# Patient Record
Sex: MALE | Race: WHITE | Employment: OTHER | URBAN - METROPOLITAN AREA
[De-identification: names, ages, dates, MRNs, and addresses within clinical notes are randomized per-mention and may not be internally consistent; named-entity substitution may affect disease eponyms.]

---

## 2017-01-09 ENCOUNTER — ANTI-COAG VISIT (OUTPATIENT)
Dept: CARDIOLOGY CLINIC | Age: 72
End: 2017-01-09

## 2017-01-09 DIAGNOSIS — I49.5 SICK SINUS SYNDROME (HCC): ICD-10-CM

## 2017-01-09 DIAGNOSIS — I48.91 ATRIAL FIBRILLATION, UNSPECIFIED TYPE (HCC): ICD-10-CM

## 2017-01-09 DIAGNOSIS — I48.20 CHRONIC ATRIAL FIBRILLATION (HCC): ICD-10-CM

## 2017-01-09 DIAGNOSIS — R00.2 PALPITATIONS: ICD-10-CM

## 2017-01-09 DIAGNOSIS — I48.0 PAROXYSMAL ATRIAL FIBRILLATION (HCC): ICD-10-CM

## 2017-01-09 DIAGNOSIS — Z98.61 HISTORY OF CORONARY ANGIOPLASTY: ICD-10-CM

## 2017-01-09 DIAGNOSIS — I25.2 OLD MYOCARDIAL INFARCTION: ICD-10-CM

## 2017-01-09 DIAGNOSIS — R06.02 SHORTNESS OF BREATH: ICD-10-CM

## 2017-01-09 LAB
INR BLD: 2.8 (ref 0.85–1.16)
PROTHROMBIN TIME: 32.6 SEC (ref 9.8–13)

## 2017-01-23 ENCOUNTER — ANTI-COAG VISIT (OUTPATIENT)
Dept: CARDIOLOGY CLINIC | Age: 72
End: 2017-01-23

## 2017-01-23 DIAGNOSIS — R06.02 SHORTNESS OF BREATH: ICD-10-CM

## 2017-01-23 DIAGNOSIS — I48.0 PAROXYSMAL ATRIAL FIBRILLATION (HCC): ICD-10-CM

## 2017-01-23 DIAGNOSIS — I25.2 OLD MYOCARDIAL INFARCTION: ICD-10-CM

## 2017-01-23 DIAGNOSIS — I48.91 ATRIAL FIBRILLATION, UNSPECIFIED TYPE (HCC): ICD-10-CM

## 2017-01-23 DIAGNOSIS — Z98.61 HISTORY OF CORONARY ANGIOPLASTY: ICD-10-CM

## 2017-01-23 DIAGNOSIS — I49.5 SICK SINUS SYNDROME (HCC): ICD-10-CM

## 2017-01-23 DIAGNOSIS — R00.2 PALPITATIONS: ICD-10-CM

## 2017-01-23 DIAGNOSIS — I48.20 CHRONIC ATRIAL FIBRILLATION (HCC): ICD-10-CM

## 2017-01-23 LAB
INR BLD: 2.58
INR BLD: 2.58 (ref 0.85–1.16)
PROTHROMBIN TIME: 30 SEC (ref 9.8–13)

## 2017-01-26 ENCOUNTER — OFFICE VISIT (OUTPATIENT)
Dept: CARDIOLOGY CLINIC | Age: 72
End: 2017-01-26

## 2017-01-26 VITALS
HEART RATE: 56 BPM | WEIGHT: 202.6 LBS | DIASTOLIC BLOOD PRESSURE: 68 MMHG | SYSTOLIC BLOOD PRESSURE: 136 MMHG | HEIGHT: 68 IN | RESPIRATION RATE: 16 BRPM | BODY MASS INDEX: 30.71 KG/M2

## 2017-01-26 DIAGNOSIS — R00.2 PALPITATIONS: ICD-10-CM

## 2017-01-26 DIAGNOSIS — I45.5 SINUS PAUSE: ICD-10-CM

## 2017-01-26 DIAGNOSIS — I48.0 PAROXYSMAL ATRIAL FIBRILLATION (HCC): Primary | ICD-10-CM

## 2017-01-26 PROCEDURE — G8484 FLU IMMUNIZE NO ADMIN: HCPCS | Performed by: INTERNAL MEDICINE

## 2017-01-26 PROCEDURE — G8427 DOCREV CUR MEDS BY ELIG CLIN: HCPCS | Performed by: INTERNAL MEDICINE

## 2017-01-26 PROCEDURE — 1036F TOBACCO NON-USER: CPT | Performed by: INTERNAL MEDICINE

## 2017-01-26 PROCEDURE — 4040F PNEUMOC VAC/ADMIN/RCVD: CPT | Performed by: INTERNAL MEDICINE

## 2017-01-26 PROCEDURE — G8598 ASA/ANTIPLAT THER USED: HCPCS | Performed by: INTERNAL MEDICINE

## 2017-01-26 PROCEDURE — G8419 CALC BMI OUT NRM PARAM NOF/U: HCPCS | Performed by: INTERNAL MEDICINE

## 2017-01-26 PROCEDURE — 99214 OFFICE O/P EST MOD 30 MIN: CPT | Performed by: INTERNAL MEDICINE

## 2017-01-26 PROCEDURE — 3017F COLORECTAL CA SCREEN DOC REV: CPT | Performed by: INTERNAL MEDICINE

## 2017-01-26 PROCEDURE — 1123F ACP DISCUSS/DSCN MKR DOCD: CPT | Performed by: INTERNAL MEDICINE

## 2017-01-26 PROCEDURE — 93000 ELECTROCARDIOGRAM COMPLETE: CPT | Performed by: INTERNAL MEDICINE

## 2017-01-26 RX ORDER — SOTALOL HYDROCHLORIDE 80 MG/1
40 TABLET ORAL 2 TIMES DAILY
Qty: 90 TABLET | Refills: 3 | Status: SHIPPED | OUTPATIENT
Start: 2017-01-26 | End: 2018-01-10 | Stop reason: SDUPTHER

## 2017-01-26 RX ORDER — FUROSEMIDE 20 MG/1
20 TABLET ORAL DAILY
COMMUNITY
End: 2019-03-14

## 2017-01-27 ENCOUNTER — NURSE ONLY (OUTPATIENT)
Dept: CARDIOLOGY CLINIC | Age: 72
End: 2017-01-27

## 2017-01-27 DIAGNOSIS — I48.91 ATRIAL FIBRILLATION, UNSPECIFIED TYPE (HCC): Primary | ICD-10-CM

## 2017-02-07 ENCOUNTER — OFFICE VISIT (OUTPATIENT)
Dept: CARDIOLOGY CLINIC | Age: 72
End: 2017-02-07

## 2017-02-07 VITALS
SYSTOLIC BLOOD PRESSURE: 112 MMHG | BODY MASS INDEX: 30.86 KG/M2 | WEIGHT: 203.6 LBS | DIASTOLIC BLOOD PRESSURE: 48 MMHG | HEIGHT: 68 IN | HEART RATE: 60 BPM

## 2017-02-07 DIAGNOSIS — I25.2 MI, OLD: ICD-10-CM

## 2017-02-07 DIAGNOSIS — I50.32 CHRONIC DIASTOLIC CONGESTIVE HEART FAILURE (HCC): Primary | ICD-10-CM

## 2017-02-07 DIAGNOSIS — I48.0 PAROXYSMAL ATRIAL FIBRILLATION (HCC): ICD-10-CM

## 2017-02-07 DIAGNOSIS — Z98.61 POST PTCA: ICD-10-CM

## 2017-02-07 DIAGNOSIS — I25.10 CAD IN NATIVE ARTERY: ICD-10-CM

## 2017-02-07 DIAGNOSIS — I10 ESSENTIAL HYPERTENSION: ICD-10-CM

## 2017-02-07 DIAGNOSIS — I35.0 NONRHEUMATIC AORTIC VALVE STENOSIS: ICD-10-CM

## 2017-02-07 PROCEDURE — 4040F PNEUMOC VAC/ADMIN/RCVD: CPT | Performed by: INTERNAL MEDICINE

## 2017-02-07 PROCEDURE — 1036F TOBACCO NON-USER: CPT | Performed by: INTERNAL MEDICINE

## 2017-02-07 PROCEDURE — G8419 CALC BMI OUT NRM PARAM NOF/U: HCPCS | Performed by: INTERNAL MEDICINE

## 2017-02-07 PROCEDURE — G8598 ASA/ANTIPLAT THER USED: HCPCS | Performed by: INTERNAL MEDICINE

## 2017-02-07 PROCEDURE — G8484 FLU IMMUNIZE NO ADMIN: HCPCS | Performed by: INTERNAL MEDICINE

## 2017-02-07 PROCEDURE — 1123F ACP DISCUSS/DSCN MKR DOCD: CPT | Performed by: INTERNAL MEDICINE

## 2017-02-07 PROCEDURE — 99214 OFFICE O/P EST MOD 30 MIN: CPT | Performed by: INTERNAL MEDICINE

## 2017-02-07 PROCEDURE — G8427 DOCREV CUR MEDS BY ELIG CLIN: HCPCS | Performed by: INTERNAL MEDICINE

## 2017-02-07 PROCEDURE — 3017F COLORECTAL CA SCREEN DOC REV: CPT | Performed by: INTERNAL MEDICINE

## 2017-02-14 ENCOUNTER — TELEPHONE (OUTPATIENT)
Dept: CARDIOLOGY CLINIC | Age: 72
End: 2017-02-14

## 2017-02-14 ENCOUNTER — NURSE ONLY (OUTPATIENT)
Dept: CARDIOLOGY CLINIC | Age: 72
End: 2017-02-14

## 2017-02-14 DIAGNOSIS — Z79.01 LONG TERM (CURRENT) USE OF ANTICOAGULANTS: ICD-10-CM

## 2017-02-14 DIAGNOSIS — Z01.818 PRE-OP TESTING: Primary | ICD-10-CM

## 2017-02-14 DIAGNOSIS — I45.5 SINUS PAUSE: ICD-10-CM

## 2017-02-15 PROBLEM — Z95.0 CARDIAC PACEMAKER IN SITU: Status: ACTIVE | Noted: 2017-02-15

## 2017-02-21 ENCOUNTER — PROCEDURE VISIT (OUTPATIENT)
Dept: CARDIOLOGY CLINIC | Age: 72
End: 2017-02-21

## 2017-02-21 ENCOUNTER — OFFICE VISIT (OUTPATIENT)
Dept: CARDIOLOGY CLINIC | Age: 72
End: 2017-02-21

## 2017-02-21 VITALS
BODY MASS INDEX: 31.04 KG/M2 | HEIGHT: 68 IN | SYSTOLIC BLOOD PRESSURE: 134 MMHG | HEART RATE: 60 BPM | WEIGHT: 204.8 LBS | DIASTOLIC BLOOD PRESSURE: 64 MMHG

## 2017-02-21 DIAGNOSIS — Z79.01 LONG TERM (CURRENT) USE OF ANTICOAGULANTS: ICD-10-CM

## 2017-02-21 DIAGNOSIS — I45.5 SINUS PAUSE: Primary | ICD-10-CM

## 2017-02-21 DIAGNOSIS — Z95.0 CARDIAC PACEMAKER IN SITU: ICD-10-CM

## 2017-02-21 DIAGNOSIS — I10 ESSENTIAL HYPERTENSION: ICD-10-CM

## 2017-02-21 DIAGNOSIS — I48.0 PAROXYSMAL ATRIAL FIBRILLATION (HCC): ICD-10-CM

## 2017-02-21 DIAGNOSIS — I49.5 SICK SINUS SYNDROME (HCC): Primary | ICD-10-CM

## 2017-02-21 DIAGNOSIS — R00.1 BRADYCARDIA: ICD-10-CM

## 2017-02-21 DIAGNOSIS — Z01.818 PRE-OP TESTING: ICD-10-CM

## 2017-02-21 DIAGNOSIS — Z95.0 PACEMAKER: ICD-10-CM

## 2017-02-21 DIAGNOSIS — I45.5 SINUS PAUSE: ICD-10-CM

## 2017-02-21 DIAGNOSIS — R00.2 PALPITATIONS: ICD-10-CM

## 2017-02-21 LAB
ANION GAP SERPL CALCULATED.3IONS-SCNC: 14 MMOL/L (ref 3–16)
BASOPHILS ABSOLUTE: 0.1 K/UL (ref 0–0.2)
BASOPHILS RELATIVE PERCENT: 1.5 %
BUN BLDV-MCNC: 33 MG/DL (ref 7–20)
CALCIUM SERPL-MCNC: 9.2 MG/DL (ref 8.3–10.6)
CHLORIDE BLD-SCNC: 106 MMOL/L (ref 99–110)
CO2: 23 MMOL/L (ref 21–32)
CREAT SERPL-MCNC: 1 MG/DL (ref 0.8–1.3)
EOSINOPHILS ABSOLUTE: 0.4 K/UL (ref 0–0.6)
EOSINOPHILS RELATIVE PERCENT: 7.9 %
GFR AFRICAN AMERICAN: >60
GFR NON-AFRICAN AMERICAN: >60
GLUCOSE BLD-MCNC: 132 MG/DL (ref 70–99)
HCT VFR BLD CALC: 33.2 % (ref 40.5–52.5)
HEMOGLOBIN: 11 G/DL (ref 13.5–17.5)
INR BLD: 1.56
INR BLD: 1.56 (ref 0.85–1.15)
LYMPHOCYTES ABSOLUTE: 1 K/UL (ref 1–5.1)
LYMPHOCYTES RELATIVE PERCENT: 19 %
MCH RBC QN AUTO: 30.2 PG (ref 26–34)
MCHC RBC AUTO-ENTMCNC: 33.2 G/DL (ref 31–36)
MCV RBC AUTO: 90.7 FL (ref 80–100)
MONOCYTES ABSOLUTE: 0.7 K/UL (ref 0–1.3)
MONOCYTES RELATIVE PERCENT: 12.4 %
NEUTROPHILS ABSOLUTE: 3.3 K/UL (ref 1.7–7.7)
NEUTROPHILS RELATIVE PERCENT: 59.2 %
PDW BLD-RTO: 15.5 % (ref 12.4–15.4)
PLATELET # BLD: 255 K/UL (ref 135–450)
PMV BLD AUTO: 9.8 FL (ref 5–10.5)
POTASSIUM SERPL-SCNC: 5.2 MMOL/L (ref 3.5–5.1)
PROTHROMBIN TIME: 17.6 SEC (ref 9.6–13)
RBC # BLD: 3.66 M/UL (ref 4.2–5.9)
SODIUM BLD-SCNC: 143 MMOL/L (ref 136–145)
WBC # BLD: 5.5 K/UL (ref 4–11)

## 2017-02-21 PROCEDURE — G8427 DOCREV CUR MEDS BY ELIG CLIN: HCPCS | Performed by: NURSE PRACTITIONER

## 2017-02-21 PROCEDURE — G8417 CALC BMI ABV UP PARAM F/U: HCPCS | Performed by: NURSE PRACTITIONER

## 2017-02-21 PROCEDURE — 99214 OFFICE O/P EST MOD 30 MIN: CPT | Performed by: NURSE PRACTITIONER

## 2017-02-21 PROCEDURE — 93280 PM DEVICE PROGR EVAL DUAL: CPT | Performed by: INTERNAL MEDICINE

## 2017-02-21 PROCEDURE — 1123F ACP DISCUSS/DSCN MKR DOCD: CPT | Performed by: NURSE PRACTITIONER

## 2017-02-21 PROCEDURE — 1036F TOBACCO NON-USER: CPT | Performed by: NURSE PRACTITIONER

## 2017-02-21 PROCEDURE — G8484 FLU IMMUNIZE NO ADMIN: HCPCS | Performed by: NURSE PRACTITIONER

## 2017-02-21 PROCEDURE — G8598 ASA/ANTIPLAT THER USED: HCPCS | Performed by: NURSE PRACTITIONER

## 2017-02-21 PROCEDURE — 4040F PNEUMOC VAC/ADMIN/RCVD: CPT | Performed by: NURSE PRACTITIONER

## 2017-02-21 PROCEDURE — 3017F COLORECTAL CA SCREEN DOC REV: CPT | Performed by: NURSE PRACTITIONER

## 2017-02-22 ENCOUNTER — ANTI-COAG VISIT (OUTPATIENT)
Dept: CARDIOLOGY | Age: 72
End: 2017-02-22

## 2017-02-22 DIAGNOSIS — I48.91 ATRIAL FIBRILLATION, UNSPECIFIED TYPE (HCC): ICD-10-CM

## 2017-02-22 DIAGNOSIS — I48.0 PAROXYSMAL ATRIAL FIBRILLATION (HCC): ICD-10-CM

## 2017-02-22 DIAGNOSIS — I10 ESSENTIAL HYPERTENSION: Primary | ICD-10-CM

## 2017-02-28 ENCOUNTER — ANTI-COAG VISIT (OUTPATIENT)
Dept: CARDIOLOGY CLINIC | Age: 72
End: 2017-02-28

## 2017-02-28 DIAGNOSIS — I10 ESSENTIAL HYPERTENSION: ICD-10-CM

## 2017-02-28 DIAGNOSIS — I48.0 PAROXYSMAL ATRIAL FIBRILLATION (HCC): ICD-10-CM

## 2017-02-28 LAB
ANION GAP SERPL CALCULATED.3IONS-SCNC: 17 MMOL/L (ref 3–16)
BUN BLDV-MCNC: 36 MG/DL (ref 7–20)
CALCIUM SERPL-MCNC: 8.9 MG/DL (ref 8.3–10.6)
CHLORIDE BLD-SCNC: 102 MMOL/L (ref 99–110)
CO2: 21 MMOL/L (ref 21–32)
CREAT SERPL-MCNC: 1 MG/DL (ref 0.8–1.3)
GFR AFRICAN AMERICAN: >60
GFR NON-AFRICAN AMERICAN: >60
GLUCOSE BLD-MCNC: 141 MG/DL (ref 70–99)
INR BLD: 1.19
INR BLD: 1.19 (ref 0.85–1.15)
POTASSIUM SERPL-SCNC: 4.9 MMOL/L (ref 3.5–5.1)
PROTHROMBIN TIME: 13.5 SEC (ref 9.6–13)
SODIUM BLD-SCNC: 140 MMOL/L (ref 136–145)

## 2017-02-28 PROCEDURE — 93228 REMOTE 30 DAY ECG REV/REPORT: CPT | Performed by: INTERNAL MEDICINE

## 2017-03-01 ENCOUNTER — TELEPHONE (OUTPATIENT)
Dept: CARDIOLOGY CLINIC | Age: 72
End: 2017-03-01

## 2017-03-01 ENCOUNTER — ANTI-COAG VISIT (OUTPATIENT)
Dept: CARDIOLOGY CLINIC | Age: 72
End: 2017-03-01

## 2017-03-01 DIAGNOSIS — I48.91 ATRIAL FIBRILLATION, UNSPECIFIED TYPE (HCC): ICD-10-CM

## 2017-03-06 DIAGNOSIS — I25.2 OLD MYOCARDIAL INFARCTION: ICD-10-CM

## 2017-03-06 DIAGNOSIS — I48.0 PAROXYSMAL ATRIAL FIBRILLATION (HCC): ICD-10-CM

## 2017-03-06 DIAGNOSIS — I49.5 SICK SINUS SYNDROME (HCC): ICD-10-CM

## 2017-03-06 DIAGNOSIS — Z98.61 HISTORY OF CORONARY ANGIOPLASTY: ICD-10-CM

## 2017-03-06 DIAGNOSIS — R06.02 SHORTNESS OF BREATH: ICD-10-CM

## 2017-03-06 DIAGNOSIS — R00.2 PALPITATIONS: ICD-10-CM

## 2017-03-06 DIAGNOSIS — I48.91 ATRIAL FIBRILLATION, UNSPECIFIED TYPE (HCC): ICD-10-CM

## 2017-03-06 LAB
INR BLD: 1.63
INR BLD: 1.63 (ref 0.85–1.15)
PROTHROMBIN TIME: 18.4 SEC (ref 9.6–13)

## 2017-03-07 ENCOUNTER — ANTI-COAG VISIT (OUTPATIENT)
Dept: CARDIOLOGY | Age: 72
End: 2017-03-07

## 2017-03-07 ENCOUNTER — TELEPHONE (OUTPATIENT)
Dept: CARDIOLOGY | Age: 72
End: 2017-03-07

## 2017-03-07 DIAGNOSIS — I49.5 SICK SINUS SYNDROME (HCC): ICD-10-CM

## 2017-03-07 DIAGNOSIS — I48.0 PAROXYSMAL ATRIAL FIBRILLATION (HCC): ICD-10-CM

## 2017-03-07 DIAGNOSIS — I10 ESSENTIAL HYPERTENSION: Primary | ICD-10-CM

## 2017-03-07 DIAGNOSIS — I48.91 ATRIAL FIBRILLATION, UNSPECIFIED TYPE (HCC): ICD-10-CM

## 2017-03-07 DIAGNOSIS — R00.2 PALPITATIONS: Primary | ICD-10-CM

## 2017-03-07 DIAGNOSIS — R00.1 BRADYCARDIA: ICD-10-CM

## 2017-03-14 ENCOUNTER — ANTI-COAG VISIT (OUTPATIENT)
Dept: CARDIOLOGY CLINIC | Age: 72
End: 2017-03-14

## 2017-03-14 DIAGNOSIS — I25.2 OLD MYOCARDIAL INFARCTION: ICD-10-CM

## 2017-03-14 DIAGNOSIS — I48.0 PAROXYSMAL ATRIAL FIBRILLATION (HCC): ICD-10-CM

## 2017-03-14 DIAGNOSIS — R00.2 PALPITATIONS: ICD-10-CM

## 2017-03-14 DIAGNOSIS — I49.5 SICK SINUS SYNDROME (HCC): ICD-10-CM

## 2017-03-14 DIAGNOSIS — I48.91 ATRIAL FIBRILLATION, UNSPECIFIED TYPE (HCC): ICD-10-CM

## 2017-03-14 DIAGNOSIS — Z98.61 HISTORY OF CORONARY ANGIOPLASTY: ICD-10-CM

## 2017-03-14 DIAGNOSIS — R06.02 SHORTNESS OF BREATH: ICD-10-CM

## 2017-03-14 LAB
INR BLD: 2.06 (ref 0.85–1.15)
PROTHROMBIN TIME: 23.3 SEC (ref 9.6–13)

## 2017-03-20 ENCOUNTER — PROCEDURE VISIT (OUTPATIENT)
Dept: CARDIOLOGY CLINIC | Age: 72
End: 2017-03-20

## 2017-03-20 ENCOUNTER — OFFICE VISIT (OUTPATIENT)
Dept: CARDIOLOGY CLINIC | Age: 72
End: 2017-03-20

## 2017-03-20 VITALS
SYSTOLIC BLOOD PRESSURE: 136 MMHG | HEIGHT: 68 IN | WEIGHT: 209.2 LBS | HEART RATE: 60 BPM | DIASTOLIC BLOOD PRESSURE: 66 MMHG | RESPIRATION RATE: 18 BRPM | BODY MASS INDEX: 31.71 KG/M2

## 2017-03-20 DIAGNOSIS — I49.5 SSS (SICK SINUS SYNDROME) (HCC): ICD-10-CM

## 2017-03-20 DIAGNOSIS — R00.2 PALPITATIONS: Primary | ICD-10-CM

## 2017-03-20 DIAGNOSIS — Z95.0 CARDIAC PACEMAKER IN SITU: ICD-10-CM

## 2017-03-20 DIAGNOSIS — I48.0 PAROXYSMAL ATRIAL FIBRILLATION (HCC): ICD-10-CM

## 2017-03-20 DIAGNOSIS — I49.5 SICK SINUS SYNDROME (HCC): ICD-10-CM

## 2017-03-20 DIAGNOSIS — I10 ESSENTIAL HYPERTENSION: ICD-10-CM

## 2017-03-20 DIAGNOSIS — Z95.0 PACEMAKER: ICD-10-CM

## 2017-03-20 DIAGNOSIS — R00.1 BRADYCARDIA: ICD-10-CM

## 2017-03-20 DIAGNOSIS — E87.5 HYPERKALEMIA: ICD-10-CM

## 2017-03-20 PROCEDURE — 99024 POSTOP FOLLOW-UP VISIT: CPT | Performed by: NURSE PRACTITIONER

## 2017-03-20 PROCEDURE — G8484 FLU IMMUNIZE NO ADMIN: HCPCS | Performed by: NURSE PRACTITIONER

## 2017-03-20 PROCEDURE — G8417 CALC BMI ABV UP PARAM F/U: HCPCS | Performed by: NURSE PRACTITIONER

## 2017-03-20 PROCEDURE — 3017F COLORECTAL CA SCREEN DOC REV: CPT | Performed by: NURSE PRACTITIONER

## 2017-03-20 PROCEDURE — 1123F ACP DISCUSS/DSCN MKR DOCD: CPT | Performed by: NURSE PRACTITIONER

## 2017-03-20 PROCEDURE — 93280 PM DEVICE PROGR EVAL DUAL: CPT | Performed by: INTERNAL MEDICINE

## 2017-03-20 PROCEDURE — G8427 DOCREV CUR MEDS BY ELIG CLIN: HCPCS | Performed by: NURSE PRACTITIONER

## 2017-03-20 PROCEDURE — 4040F PNEUMOC VAC/ADMIN/RCVD: CPT | Performed by: NURSE PRACTITIONER

## 2017-03-20 PROCEDURE — G8598 ASA/ANTIPLAT THER USED: HCPCS | Performed by: NURSE PRACTITIONER

## 2017-03-20 PROCEDURE — 93000 ELECTROCARDIOGRAM COMPLETE: CPT | Performed by: NURSE PRACTITIONER

## 2017-03-20 PROCEDURE — 1036F TOBACCO NON-USER: CPT | Performed by: NURSE PRACTITIONER

## 2017-03-21 DIAGNOSIS — I49.5 SICK SINUS SYNDROME (HCC): ICD-10-CM

## 2017-03-21 DIAGNOSIS — I25.2 OLD MYOCARDIAL INFARCTION: ICD-10-CM

## 2017-03-21 DIAGNOSIS — Z98.61 HISTORY OF CORONARY ANGIOPLASTY: ICD-10-CM

## 2017-03-21 DIAGNOSIS — R06.02 SHORTNESS OF BREATH: ICD-10-CM

## 2017-03-21 DIAGNOSIS — I48.91 ATRIAL FIBRILLATION, UNSPECIFIED TYPE (HCC): ICD-10-CM

## 2017-03-21 DIAGNOSIS — I48.0 PAROXYSMAL ATRIAL FIBRILLATION (HCC): ICD-10-CM

## 2017-03-21 DIAGNOSIS — R00.2 PALPITATIONS: ICD-10-CM

## 2017-03-21 LAB
INR BLD: 2.85
INR BLD: 2.85 (ref 0.85–1.15)
PROTHROMBIN TIME: 32.2 SEC (ref 9.6–13)

## 2017-03-22 ENCOUNTER — ANTI-COAG VISIT (OUTPATIENT)
Dept: CARDIOLOGY | Age: 72
End: 2017-03-22

## 2017-03-22 DIAGNOSIS — I48.0 PAROXYSMAL ATRIAL FIBRILLATION (HCC): ICD-10-CM

## 2017-04-06 DIAGNOSIS — R06.02 SHORTNESS OF BREATH: ICD-10-CM

## 2017-04-06 DIAGNOSIS — I48.0 PAROXYSMAL ATRIAL FIBRILLATION (HCC): ICD-10-CM

## 2017-04-06 DIAGNOSIS — I49.5 SICK SINUS SYNDROME (HCC): ICD-10-CM

## 2017-04-06 DIAGNOSIS — I10 ESSENTIAL HYPERTENSION: ICD-10-CM

## 2017-04-06 DIAGNOSIS — Z98.61 HISTORY OF CORONARY ANGIOPLASTY: ICD-10-CM

## 2017-04-06 DIAGNOSIS — I25.2 OLD MYOCARDIAL INFARCTION: ICD-10-CM

## 2017-04-06 DIAGNOSIS — R00.2 PALPITATIONS: ICD-10-CM

## 2017-04-06 DIAGNOSIS — I48.91 ATRIAL FIBRILLATION, UNSPECIFIED TYPE (HCC): ICD-10-CM

## 2017-04-06 LAB
ANION GAP SERPL CALCULATED.3IONS-SCNC: 15 MMOL/L (ref 3–16)
BUN BLDV-MCNC: 31 MG/DL (ref 7–20)
CALCIUM SERPL-MCNC: 9.3 MG/DL (ref 8.3–10.6)
CHLORIDE BLD-SCNC: 103 MMOL/L (ref 99–110)
CO2: 22 MMOL/L (ref 21–32)
CREAT SERPL-MCNC: 1 MG/DL (ref 0.8–1.3)
GFR AFRICAN AMERICAN: >60
GFR NON-AFRICAN AMERICAN: >60
GLUCOSE BLD-MCNC: 132 MG/DL (ref 70–99)
INR BLD: 3.31 (ref 0.85–1.15)
POTASSIUM SERPL-SCNC: 4.7 MMOL/L (ref 3.5–5.1)
PROTHROMBIN TIME: 37.4 SEC (ref 9.6–13)
SODIUM BLD-SCNC: 140 MMOL/L (ref 136–145)

## 2017-04-07 ENCOUNTER — ANTI-COAG VISIT (OUTPATIENT)
Dept: CARDIOLOGY CLINIC | Age: 72
End: 2017-04-07

## 2017-04-07 DIAGNOSIS — I48.0 PAROXYSMAL ATRIAL FIBRILLATION (HCC): ICD-10-CM

## 2017-04-13 DIAGNOSIS — I25.2 OLD MYOCARDIAL INFARCTION: ICD-10-CM

## 2017-04-13 DIAGNOSIS — R06.02 SHORTNESS OF BREATH: ICD-10-CM

## 2017-04-13 DIAGNOSIS — I48.0 PAROXYSMAL ATRIAL FIBRILLATION (HCC): ICD-10-CM

## 2017-04-13 DIAGNOSIS — Z98.61 HISTORY OF CORONARY ANGIOPLASTY: ICD-10-CM

## 2017-04-13 DIAGNOSIS — I48.91 ATRIAL FIBRILLATION, UNSPECIFIED TYPE (HCC): ICD-10-CM

## 2017-04-13 DIAGNOSIS — R00.2 PALPITATIONS: ICD-10-CM

## 2017-04-13 DIAGNOSIS — I49.5 SICK SINUS SYNDROME (HCC): ICD-10-CM

## 2017-04-13 LAB
INR BLD: 2.97 (ref 0.85–1.15)
PROTHROMBIN TIME: 33.6 SEC (ref 9.6–13)

## 2017-04-17 ENCOUNTER — TELEPHONE (OUTPATIENT)
Dept: CARDIOLOGY CLINIC | Age: 72
End: 2017-04-17

## 2017-04-17 ENCOUNTER — ANTI-COAG VISIT (OUTPATIENT)
Dept: CARDIOLOGY CLINIC | Age: 72
End: 2017-04-17

## 2017-04-17 DIAGNOSIS — I48.0 PAROXYSMAL ATRIAL FIBRILLATION (HCC): ICD-10-CM

## 2017-04-26 DIAGNOSIS — I25.119 CORONARY ARTERY DISEASE INVOLVING NATIVE CORONARY ARTERY OF NATIVE HEART WITH ANGINA PECTORIS (HCC): ICD-10-CM

## 2017-04-26 DIAGNOSIS — Z95.0 CARDIAC PACEMAKER IN SITU: ICD-10-CM

## 2017-04-26 DIAGNOSIS — I48.0 PAROXYSMAL ATRIAL FIBRILLATION (HCC): ICD-10-CM

## 2017-04-26 DIAGNOSIS — I48.0 PAROXYSMAL ATRIAL FIBRILLATION (HCC): Primary | ICD-10-CM

## 2017-04-26 LAB
INR BLD: 2.82
INR BLD: 2.82 (ref 0.85–1.15)
PROTHROMBIN TIME: 31.9 SEC (ref 9.6–13)

## 2017-04-27 ENCOUNTER — ANTI-COAG VISIT (OUTPATIENT)
Dept: CARDIOLOGY | Age: 72
End: 2017-04-27

## 2017-04-27 DIAGNOSIS — I48.0 PAROXYSMAL ATRIAL FIBRILLATION (HCC): ICD-10-CM

## 2017-05-16 ENCOUNTER — OFFICE VISIT (OUTPATIENT)
Dept: CARDIOLOGY CLINIC | Age: 72
End: 2017-05-16

## 2017-05-16 VITALS
BODY MASS INDEX: 32.39 KG/M2 | HEART RATE: 68 BPM | WEIGHT: 213 LBS | DIASTOLIC BLOOD PRESSURE: 70 MMHG | SYSTOLIC BLOOD PRESSURE: 120 MMHG

## 2017-05-16 DIAGNOSIS — I50.32 CHRONIC DIASTOLIC CONGESTIVE HEART FAILURE (HCC): Primary | ICD-10-CM

## 2017-05-16 DIAGNOSIS — I48.0 PAROXYSMAL ATRIAL FIBRILLATION (HCC): ICD-10-CM

## 2017-05-16 DIAGNOSIS — I49.5 SSS (SICK SINUS SYNDROME) (HCC): ICD-10-CM

## 2017-05-16 DIAGNOSIS — Z98.61 POST PTCA: ICD-10-CM

## 2017-05-16 DIAGNOSIS — I35.0 NONRHEUMATIC AORTIC VALVE STENOSIS: ICD-10-CM

## 2017-05-16 DIAGNOSIS — I25.10 CAD IN NATIVE ARTERY: ICD-10-CM

## 2017-05-16 DIAGNOSIS — Z95.0 PACEMAKER: ICD-10-CM

## 2017-05-16 PROCEDURE — 1123F ACP DISCUSS/DSCN MKR DOCD: CPT | Performed by: INTERNAL MEDICINE

## 2017-05-16 PROCEDURE — 99214 OFFICE O/P EST MOD 30 MIN: CPT | Performed by: INTERNAL MEDICINE

## 2017-05-16 PROCEDURE — G8417 CALC BMI ABV UP PARAM F/U: HCPCS | Performed by: INTERNAL MEDICINE

## 2017-05-16 PROCEDURE — 1036F TOBACCO NON-USER: CPT | Performed by: INTERNAL MEDICINE

## 2017-05-16 PROCEDURE — 4040F PNEUMOC VAC/ADMIN/RCVD: CPT | Performed by: INTERNAL MEDICINE

## 2017-05-16 PROCEDURE — G8427 DOCREV CUR MEDS BY ELIG CLIN: HCPCS | Performed by: INTERNAL MEDICINE

## 2017-05-16 PROCEDURE — 3017F COLORECTAL CA SCREEN DOC REV: CPT | Performed by: INTERNAL MEDICINE

## 2017-05-16 PROCEDURE — G8598 ASA/ANTIPLAT THER USED: HCPCS | Performed by: INTERNAL MEDICINE

## 2017-05-23 DIAGNOSIS — I48.0 PAROXYSMAL ATRIAL FIBRILLATION (HCC): ICD-10-CM

## 2017-05-23 DIAGNOSIS — Z95.0 CARDIAC PACEMAKER IN SITU: ICD-10-CM

## 2017-05-23 DIAGNOSIS — I25.119 CORONARY ARTERY DISEASE INVOLVING NATIVE CORONARY ARTERY OF NATIVE HEART WITH ANGINA PECTORIS (HCC): ICD-10-CM

## 2017-05-23 LAB
INR BLD: 4.12
INR BLD: 4.12 (ref 0.85–1.15)
PROTHROMBIN TIME: 46.6 SEC (ref 9.6–13)

## 2017-05-24 ENCOUNTER — ANTI-COAG VISIT (OUTPATIENT)
Dept: CARDIOLOGY | Age: 72
End: 2017-05-24

## 2017-05-24 DIAGNOSIS — I48.0 PAROXYSMAL ATRIAL FIBRILLATION (HCC): ICD-10-CM

## 2017-06-01 ENCOUNTER — ANTI-COAG VISIT (OUTPATIENT)
Dept: CARDIOLOGY CLINIC | Age: 72
End: 2017-06-01

## 2017-06-01 DIAGNOSIS — Z95.0 CARDIAC PACEMAKER IN SITU: ICD-10-CM

## 2017-06-01 DIAGNOSIS — I48.0 PAROXYSMAL ATRIAL FIBRILLATION (HCC): ICD-10-CM

## 2017-06-01 DIAGNOSIS — I25.119 CORONARY ARTERY DISEASE INVOLVING NATIVE CORONARY ARTERY OF NATIVE HEART WITH ANGINA PECTORIS (HCC): ICD-10-CM

## 2017-06-01 LAB
INR BLD: 2.86 (ref 0.85–1.15)
PROTHROMBIN TIME: 32.3 SEC (ref 9.6–13)

## 2017-06-15 DIAGNOSIS — I25.119 CORONARY ARTERY DISEASE INVOLVING NATIVE CORONARY ARTERY OF NATIVE HEART WITH ANGINA PECTORIS (HCC): ICD-10-CM

## 2017-06-15 DIAGNOSIS — Z95.0 CARDIAC PACEMAKER IN SITU: ICD-10-CM

## 2017-06-15 DIAGNOSIS — I48.0 PAROXYSMAL ATRIAL FIBRILLATION (HCC): ICD-10-CM

## 2017-06-15 LAB
INR BLD: 2.35 (ref 0.85–1.15)
PROTHROMBIN TIME: 26.5 SEC (ref 9.6–13)

## 2017-06-16 ENCOUNTER — ANTI-COAG VISIT (OUTPATIENT)
Dept: CARDIOLOGY CLINIC | Age: 72
End: 2017-06-16

## 2017-06-16 DIAGNOSIS — I48.0 PAROXYSMAL ATRIAL FIBRILLATION (HCC): ICD-10-CM

## 2017-06-22 RX ORDER — DILTIAZEM HYDROCHLORIDE 120 MG/1
CAPSULE, COATED, EXTENDED RELEASE ORAL
Qty: 60 CAPSULE | Refills: 5 | Status: SHIPPED | OUTPATIENT
Start: 2017-06-22 | End: 2017-12-18 | Stop reason: SDUPTHER

## 2017-06-27 ENCOUNTER — OFFICE VISIT (OUTPATIENT)
Dept: CARDIOLOGY CLINIC | Age: 72
End: 2017-06-27

## 2017-06-27 ENCOUNTER — PROCEDURE VISIT (OUTPATIENT)
Dept: CARDIOLOGY CLINIC | Age: 72
End: 2017-06-27

## 2017-06-27 VITALS
WEIGHT: 214.8 LBS | DIASTOLIC BLOOD PRESSURE: 64 MMHG | BODY MASS INDEX: 32.55 KG/M2 | SYSTOLIC BLOOD PRESSURE: 116 MMHG | HEIGHT: 68 IN | RESPIRATION RATE: 16 BRPM | HEART RATE: 78 BPM

## 2017-06-27 DIAGNOSIS — I48.0 PAROXYSMAL ATRIAL FIBRILLATION (HCC): ICD-10-CM

## 2017-06-27 DIAGNOSIS — I49.5 SICK SINUS SYNDROME (HCC): ICD-10-CM

## 2017-06-27 DIAGNOSIS — R00.1 BRADYCARDIA: ICD-10-CM

## 2017-06-27 DIAGNOSIS — Z95.0 PACEMAKER: Primary | ICD-10-CM

## 2017-06-27 DIAGNOSIS — Z95.0 CARDIAC PACEMAKER IN SITU: ICD-10-CM

## 2017-06-27 DIAGNOSIS — I45.5 SINUS PAUSE: ICD-10-CM

## 2017-06-27 DIAGNOSIS — R06.02 SHORTNESS OF BREATH: ICD-10-CM

## 2017-06-27 PROCEDURE — 1123F ACP DISCUSS/DSCN MKR DOCD: CPT | Performed by: NURSE PRACTITIONER

## 2017-06-27 PROCEDURE — 1036F TOBACCO NON-USER: CPT | Performed by: NURSE PRACTITIONER

## 2017-06-27 PROCEDURE — 3017F COLORECTAL CA SCREEN DOC REV: CPT | Performed by: NURSE PRACTITIONER

## 2017-06-27 PROCEDURE — 4040F PNEUMOC VAC/ADMIN/RCVD: CPT | Performed by: NURSE PRACTITIONER

## 2017-06-27 PROCEDURE — 99214 OFFICE O/P EST MOD 30 MIN: CPT | Performed by: NURSE PRACTITIONER

## 2017-06-27 PROCEDURE — G8417 CALC BMI ABV UP PARAM F/U: HCPCS | Performed by: NURSE PRACTITIONER

## 2017-06-27 PROCEDURE — G8598 ASA/ANTIPLAT THER USED: HCPCS | Performed by: NURSE PRACTITIONER

## 2017-06-27 PROCEDURE — 93280 PM DEVICE PROGR EVAL DUAL: CPT | Performed by: INTERNAL MEDICINE

## 2017-06-27 PROCEDURE — G8427 DOCREV CUR MEDS BY ELIG CLIN: HCPCS | Performed by: NURSE PRACTITIONER

## 2017-07-19 DIAGNOSIS — I25.119 CORONARY ARTERY DISEASE INVOLVING NATIVE CORONARY ARTERY OF NATIVE HEART WITH ANGINA PECTORIS (HCC): ICD-10-CM

## 2017-07-19 DIAGNOSIS — Z95.0 CARDIAC PACEMAKER IN SITU: ICD-10-CM

## 2017-07-19 DIAGNOSIS — I48.0 PAROXYSMAL ATRIAL FIBRILLATION (HCC): ICD-10-CM

## 2017-07-19 LAB
INR BLD: 2.4 (ref 0.85–1.15)
PROTHROMBIN TIME: 27.1 SEC (ref 9.6–13)

## 2017-07-20 ENCOUNTER — ANTI-COAG VISIT (OUTPATIENT)
Dept: CARDIOLOGY CLINIC | Age: 72
End: 2017-07-20

## 2017-07-20 DIAGNOSIS — I48.0 PAROXYSMAL ATRIAL FIBRILLATION (HCC): ICD-10-CM

## 2017-08-15 ENCOUNTER — OFFICE VISIT (OUTPATIENT)
Dept: CARDIOLOGY CLINIC | Age: 72
End: 2017-08-15

## 2017-08-15 VITALS
SYSTOLIC BLOOD PRESSURE: 118 MMHG | HEART RATE: 60 BPM | WEIGHT: 210.2 LBS | BODY MASS INDEX: 31.96 KG/M2 | DIASTOLIC BLOOD PRESSURE: 64 MMHG

## 2017-08-15 DIAGNOSIS — I35.0 NONRHEUMATIC AORTIC VALVE STENOSIS: ICD-10-CM

## 2017-08-15 DIAGNOSIS — I49.5 SSS (SICK SINUS SYNDROME) (HCC): ICD-10-CM

## 2017-08-15 DIAGNOSIS — Z95.0 PACEMAKER: ICD-10-CM

## 2017-08-15 DIAGNOSIS — I50.32 CHRONIC DIASTOLIC CONGESTIVE HEART FAILURE (HCC): Primary | ICD-10-CM

## 2017-08-15 DIAGNOSIS — Z98.61 POST PTCA: ICD-10-CM

## 2017-08-15 DIAGNOSIS — I48.0 PAROXYSMAL ATRIAL FIBRILLATION (HCC): ICD-10-CM

## 2017-08-15 DIAGNOSIS — I25.10 CAD IN NATIVE ARTERY: ICD-10-CM

## 2017-08-15 PROCEDURE — 4040F PNEUMOC VAC/ADMIN/RCVD: CPT | Performed by: INTERNAL MEDICINE

## 2017-08-15 PROCEDURE — G8598 ASA/ANTIPLAT THER USED: HCPCS | Performed by: INTERNAL MEDICINE

## 2017-08-15 PROCEDURE — 3017F COLORECTAL CA SCREEN DOC REV: CPT | Performed by: INTERNAL MEDICINE

## 2017-08-15 PROCEDURE — 1036F TOBACCO NON-USER: CPT | Performed by: INTERNAL MEDICINE

## 2017-08-15 PROCEDURE — 1123F ACP DISCUSS/DSCN MKR DOCD: CPT | Performed by: INTERNAL MEDICINE

## 2017-08-15 PROCEDURE — G8417 CALC BMI ABV UP PARAM F/U: HCPCS | Performed by: INTERNAL MEDICINE

## 2017-08-15 PROCEDURE — 99214 OFFICE O/P EST MOD 30 MIN: CPT | Performed by: INTERNAL MEDICINE

## 2017-08-15 PROCEDURE — G8427 DOCREV CUR MEDS BY ELIG CLIN: HCPCS | Performed by: INTERNAL MEDICINE

## 2017-08-15 ASSESSMENT — ENCOUNTER SYMPTOMS
CHOKING: 0
COUGH: 0
CHEST TIGHTNESS: 0
APNEA: 0
SHORTNESS OF BREATH: 0
ABDOMINAL DISTENTION: 0

## 2017-08-16 ENCOUNTER — TELEPHONE (OUTPATIENT)
Dept: CARDIOLOGY CLINIC | Age: 72
End: 2017-08-16

## 2017-08-28 ENCOUNTER — ANTI-COAG VISIT (OUTPATIENT)
Dept: CARDIOLOGY CLINIC | Age: 72
End: 2017-08-28

## 2017-08-28 DIAGNOSIS — I25.119 CORONARY ARTERY DISEASE INVOLVING NATIVE CORONARY ARTERY OF NATIVE HEART WITH ANGINA PECTORIS (HCC): ICD-10-CM

## 2017-08-28 DIAGNOSIS — I48.0 PAROXYSMAL ATRIAL FIBRILLATION (HCC): ICD-10-CM

## 2017-08-28 DIAGNOSIS — Z95.0 CARDIAC PACEMAKER IN SITU: ICD-10-CM

## 2017-08-28 LAB
INR BLD: 2.94 (ref 0.85–1.15)
PROTHROMBIN TIME: 33.2 SEC (ref 9.6–13)

## 2017-09-12 ENCOUNTER — PROCEDURE VISIT (OUTPATIENT)
Dept: CARDIOLOGY CLINIC | Age: 72
End: 2017-09-12

## 2017-09-12 ENCOUNTER — OFFICE VISIT (OUTPATIENT)
Dept: CARDIOLOGY CLINIC | Age: 72
End: 2017-09-12

## 2017-09-12 VITALS
WEIGHT: 212.4 LBS | SYSTOLIC BLOOD PRESSURE: 128 MMHG | HEART RATE: 60 BPM | BODY MASS INDEX: 32.3 KG/M2 | DIASTOLIC BLOOD PRESSURE: 64 MMHG

## 2017-09-12 DIAGNOSIS — I48.91 ATRIAL FIBRILLATION, UNSPECIFIED TYPE (HCC): Primary | ICD-10-CM

## 2017-09-12 DIAGNOSIS — I48.0 PAROXYSMAL ATRIAL FIBRILLATION (HCC): Primary | ICD-10-CM

## 2017-09-12 DIAGNOSIS — G47.33 OSA (OBSTRUCTIVE SLEEP APNEA): ICD-10-CM

## 2017-09-12 DIAGNOSIS — Z95.0 PACEMAKER: ICD-10-CM

## 2017-09-12 DIAGNOSIS — I45.5 SINUS PAUSE: ICD-10-CM

## 2017-09-12 PROCEDURE — 4040F PNEUMOC VAC/ADMIN/RCVD: CPT | Performed by: NURSE PRACTITIONER

## 2017-09-12 PROCEDURE — 99214 OFFICE O/P EST MOD 30 MIN: CPT | Performed by: NURSE PRACTITIONER

## 2017-09-12 PROCEDURE — 1036F TOBACCO NON-USER: CPT | Performed by: NURSE PRACTITIONER

## 2017-09-12 PROCEDURE — 1123F ACP DISCUSS/DSCN MKR DOCD: CPT | Performed by: NURSE PRACTITIONER

## 2017-09-12 PROCEDURE — 3017F COLORECTAL CA SCREEN DOC REV: CPT | Performed by: NURSE PRACTITIONER

## 2017-09-12 PROCEDURE — 93288 INTERROG EVL PM/LDLS PM IP: CPT | Performed by: INTERNAL MEDICINE

## 2017-09-12 PROCEDURE — G8417 CALC BMI ABV UP PARAM F/U: HCPCS | Performed by: NURSE PRACTITIONER

## 2017-09-12 PROCEDURE — G8427 DOCREV CUR MEDS BY ELIG CLIN: HCPCS | Performed by: NURSE PRACTITIONER

## 2017-09-12 PROCEDURE — G8598 ASA/ANTIPLAT THER USED: HCPCS | Performed by: NURSE PRACTITIONER

## 2017-09-26 DIAGNOSIS — I48.0 PAROXYSMAL ATRIAL FIBRILLATION (HCC): ICD-10-CM

## 2017-09-26 DIAGNOSIS — I25.119 CORONARY ARTERY DISEASE INVOLVING NATIVE CORONARY ARTERY OF NATIVE HEART WITH ANGINA PECTORIS (HCC): ICD-10-CM

## 2017-09-26 DIAGNOSIS — Z95.0 CARDIAC PACEMAKER IN SITU: ICD-10-CM

## 2017-09-26 LAB
INR BLD: 1.34 (ref 0.85–1.15)
PROTHROMBIN TIME: 15.1 SEC (ref 9.6–13)

## 2017-09-27 ENCOUNTER — ANTI-COAG VISIT (OUTPATIENT)
Dept: CARDIOLOGY CLINIC | Age: 72
End: 2017-09-27

## 2017-09-27 DIAGNOSIS — I48.0 PAROXYSMAL ATRIAL FIBRILLATION (HCC): ICD-10-CM

## 2017-10-05 DIAGNOSIS — I25.119 CORONARY ARTERY DISEASE INVOLVING NATIVE CORONARY ARTERY OF NATIVE HEART WITH ANGINA PECTORIS (HCC): ICD-10-CM

## 2017-10-05 DIAGNOSIS — Z95.0 CARDIAC PACEMAKER IN SITU: ICD-10-CM

## 2017-10-05 DIAGNOSIS — I48.0 PAROXYSMAL ATRIAL FIBRILLATION (HCC): ICD-10-CM

## 2017-10-05 LAB
INR BLD: 2.65 (ref 0.85–1.15)
PROTHROMBIN TIME: 29.9 SEC (ref 9.6–13)

## 2017-10-06 ENCOUNTER — ANTI-COAG VISIT (OUTPATIENT)
Dept: CARDIOLOGY CLINIC | Age: 72
End: 2017-10-06

## 2017-10-06 DIAGNOSIS — I48.0 PAROXYSMAL ATRIAL FIBRILLATION (HCC): ICD-10-CM

## 2017-10-12 ENCOUNTER — ANTI-COAG VISIT (OUTPATIENT)
Dept: CARDIOLOGY CLINIC | Age: 72
End: 2017-10-12

## 2017-10-12 DIAGNOSIS — Z95.0 CARDIAC PACEMAKER IN SITU: ICD-10-CM

## 2017-10-12 DIAGNOSIS — I25.119 CORONARY ARTERY DISEASE INVOLVING NATIVE CORONARY ARTERY OF NATIVE HEART WITH ANGINA PECTORIS (HCC): ICD-10-CM

## 2017-10-12 DIAGNOSIS — I48.0 PAROXYSMAL ATRIAL FIBRILLATION (HCC): ICD-10-CM

## 2017-10-12 LAB
INR BLD: 2.86
INR BLD: 2.86 (ref 0.85–1.15)
PROTHROMBIN TIME: 32.3 SEC (ref 9.6–13)

## 2017-10-20 RX ORDER — LOSARTAN POTASSIUM 100 MG/1
100 TABLET ORAL 2 TIMES DAILY
Qty: 180 TABLET | Refills: 4 | Status: SHIPPED | OUTPATIENT
Start: 2017-10-20 | End: 2019-01-16 | Stop reason: SDUPTHER

## 2017-10-20 RX ORDER — WARFARIN SODIUM 7.5 MG/1
TABLET ORAL
Qty: 45 TABLET | Refills: 6 | Status: SHIPPED | OUTPATIENT
Start: 2017-10-20 | End: 2019-02-20 | Stop reason: SDUPTHER

## 2017-10-26 DIAGNOSIS — I48.0 PAROXYSMAL ATRIAL FIBRILLATION (HCC): ICD-10-CM

## 2017-10-26 DIAGNOSIS — I25.119 CORONARY ARTERY DISEASE INVOLVING NATIVE CORONARY ARTERY OF NATIVE HEART WITH ANGINA PECTORIS (HCC): ICD-10-CM

## 2017-10-26 DIAGNOSIS — Z95.0 CARDIAC PACEMAKER IN SITU: ICD-10-CM

## 2017-10-26 LAB
INR BLD: 2.98 (ref 0.85–1.15)
PROTHROMBIN TIME: 33.7 SEC (ref 9.6–13)

## 2017-10-27 ENCOUNTER — ANTI-COAG VISIT (OUTPATIENT)
Dept: CARDIOLOGY CLINIC | Age: 72
End: 2017-10-27

## 2017-10-27 DIAGNOSIS — I48.0 PAROXYSMAL ATRIAL FIBRILLATION (HCC): ICD-10-CM

## 2017-10-27 NOTE — PATIENT INSTRUCTIONS
Old Dose: Take 7.5 mg Sun, Tues, Wed, Sat; take 5 mg all other days  New Dose:  No change  Recheck in 1 month     Left message with instructions

## 2017-11-08 ENCOUNTER — TELEPHONE (OUTPATIENT)
Dept: CARDIOLOGY | Age: 72
End: 2017-11-08

## 2017-11-28 ENCOUNTER — ANTI-COAG VISIT (OUTPATIENT)
Dept: CARDIOLOGY CLINIC | Age: 72
End: 2017-11-28

## 2017-11-28 DIAGNOSIS — Z95.0 CARDIAC PACEMAKER IN SITU: ICD-10-CM

## 2017-11-28 DIAGNOSIS — I25.119 CORONARY ARTERY DISEASE INVOLVING NATIVE CORONARY ARTERY OF NATIVE HEART WITH ANGINA PECTORIS (HCC): ICD-10-CM

## 2017-11-28 DIAGNOSIS — I48.0 PAROXYSMAL ATRIAL FIBRILLATION (HCC): ICD-10-CM

## 2017-11-28 LAB
INR BLD: 2.47
INR BLD: 2.47 (ref 0.85–1.15)
PROTHROMBIN TIME: 27.9 SEC (ref 9.6–13)

## 2017-12-04 ENCOUNTER — OFFICE VISIT (OUTPATIENT)
Dept: CARDIOLOGY CLINIC | Age: 72
End: 2017-12-04

## 2017-12-04 VITALS
SYSTOLIC BLOOD PRESSURE: 128 MMHG | WEIGHT: 210.2 LBS | HEART RATE: 60 BPM | BODY MASS INDEX: 31.96 KG/M2 | DIASTOLIC BLOOD PRESSURE: 64 MMHG

## 2017-12-04 DIAGNOSIS — I10 ESSENTIAL HYPERTENSION: ICD-10-CM

## 2017-12-04 DIAGNOSIS — R00.1 BRADYCARDIA: ICD-10-CM

## 2017-12-04 DIAGNOSIS — I25.119 CORONARY ARTERY DISEASE INVOLVING NATIVE CORONARY ARTERY OF NATIVE HEART WITH ANGINA PECTORIS (HCC): Primary | ICD-10-CM

## 2017-12-04 DIAGNOSIS — Z95.0 CARDIAC PACEMAKER IN SITU: ICD-10-CM

## 2017-12-04 PROCEDURE — G8417 CALC BMI ABV UP PARAM F/U: HCPCS | Performed by: INTERNAL MEDICINE

## 2017-12-04 PROCEDURE — 93280 PM DEVICE PROGR EVAL DUAL: CPT | Performed by: INTERNAL MEDICINE

## 2017-12-04 PROCEDURE — G8598 ASA/ANTIPLAT THER USED: HCPCS | Performed by: INTERNAL MEDICINE

## 2017-12-04 PROCEDURE — 4040F PNEUMOC VAC/ADMIN/RCVD: CPT | Performed by: INTERNAL MEDICINE

## 2017-12-04 PROCEDURE — G8427 DOCREV CUR MEDS BY ELIG CLIN: HCPCS | Performed by: INTERNAL MEDICINE

## 2017-12-04 PROCEDURE — G8484 FLU IMMUNIZE NO ADMIN: HCPCS | Performed by: INTERNAL MEDICINE

## 2017-12-04 PROCEDURE — 99215 OFFICE O/P EST HI 40 MIN: CPT | Performed by: INTERNAL MEDICINE

## 2017-12-04 PROCEDURE — 3017F COLORECTAL CA SCREEN DOC REV: CPT | Performed by: INTERNAL MEDICINE

## 2017-12-04 PROCEDURE — 1036F TOBACCO NON-USER: CPT | Performed by: INTERNAL MEDICINE

## 2017-12-04 PROCEDURE — 1123F ACP DISCUSS/DSCN MKR DOCD: CPT | Performed by: INTERNAL MEDICINE

## 2017-12-04 RX ORDER — OXYBUTYNIN CHLORIDE 5 MG/5ML
5 SYRUP ORAL PRN
COMMUNITY

## 2017-12-04 NOTE — PROGRESS NOTES
[facility], [place]   Cardiac Consultation    Referring Provider:  Shamika Marks MD     Chief Concerns: PAF, SSS s/p Medtronic dual chamber pacemaker (2/15/17 by Dr Shannon Gamble)    HPI:  Nkechi Jett is a 74 y.o. male a new patient for PAF follow up. Patient has hx of CAD s/p PCI x 1 (2007) by Dr Oneta Cheadle, mild AS, BRAD on nasal CPAP, NIDDM, hypothyroidism s/p total thyroidectomy, HTN, gout, PAF & SSS s/p Medtronic DDD pacemaker (2/15/17). He has been doing well in general. He has been on 40 mg Sotalol twice a day & Cardizem 120 mg daily for Atrial fibrillation control. He is also taking warfarin for stroke prevention. INR on 11/28/17 was 2.47 within therapeutic range followed by Dr Oneta Cheadle office. He shows no S/S of bleeding. His EKG today was in SR with QTc: 456 ms. His pacemaker was interrogated today. Device function normally. AF burden 9%, AP: 51%, : 2%. Patient stated since he has been worn nasal CPAP, he has noticed his palpitations have decreased significantly. He continues to be active. He has a  to train him twice a week and self exercises three times a week about 15 mins total. Recent labs done by PCP: total cholesterol: 126, Triglyceride: 42, HDL: 61, LDL: 57 (11/29/17). BMP: K: 4.2, Cr: 0.8, BUN: 24, TSH: 7.82, free T4: 1.3 (11/29/17). Today he denied CP, SOB, dizziness, fatigue, or leg edema. Past Medical History:   has a past medical history of Atrial fibrillation ([address] CAD (coronary artery disease), native coronary artery; Diabetes mellitus ([address] Essential hypertension; Gout; Hypothyroid; Left carotid bruit; and Peripheral neuropathy ([address] Surgical History:   has a past surgical history that includes Diagnostic Cardiac Cath Lab Procedure (12/17/07); Coronary angioplasty (12/17/07); Thyroidectomy (2011); Cholecystectomy, open (1993); and Total knee arthroplasty (Right, 3/12/14). Social History:   reports that he has never smoked.  He has never used smokeless tobacco. He reports that he does not drink alcohol or use drugs. Family History:  family history is not on file. Home Medications:  Outpatient Encounter Prescriptions as of 12/4/2017   Medication Sig Dispense Refill    oxybutynin (DITROPAN) 5 MG/5ML syrup Take 5 mg by mouth daily      warfarin (COUMADIN) 7.5 MG tablet PATIENT TAKES 5MG ON MON, WED, AND FRI. THEN 7.5MG ON THE OTHER FOUR DAYS. 45 tablet 6    losartan (COZAAR) 100 MG tablet TAKE 1 TABLET BY MOUTH 2 TIMES DAILY 180 tablet 4    diltiazem (CARDIZEM CD) 120 MG extended release capsule TAKE 1 CAPSULE BY MOUTH 2 TIMES DAILY 60 capsule 5    glipiZIDE (GLUCOTROL) 5 MG tablet Take 5 mg by mouth 2 times daily       minoxidil (LONITEN) 10 MG tablet Take 5 mg by mouth 2 times daily      warfarin (COUMADIN) 5 MG tablet Take 5 mg by mouth daily Tuesday, Wednesday, Friday, Saturday, Sunday      warfarin (COUMADIN) 7.5 MG tablet Take 7.5 mg by mouth Monday and Thursday      tamsulosin (FLOMAX) 0.4 MG capsule Take 0.4 mg by mouth nightly      furosemide (LASIX) 20 MG tablet Take 20 mg by mouth daily      sotalol (BETAPACE) 80 MG tablet Take 0.5 tablets by mouth 2 times daily 90 tablet 3    ranitidine (ZANTAC) 150 MG tablet Take 150 mg by mouth nightly      traZODone (DESYREL) 100 MG tablet Take 100 mg by mouth nightly      atorvastatin (LIPITOR) 40 MG tablet Take 40 mg by mouth nightly       aspirin 81 MG tablet Take 81 mg by mouth daily.  diclofenac sodium (VOLTAREN) 1 % GEL Apply 2 g topically 2 times daily as needed       levothyroxine (SYNTHROID) 150 MCG tablet Take 150 mcg by mouth daily.  allopurinol (ZYLOPRIM) 300 MG tablet Take 300 mg by mouth nightly       gemfibrozil (LOPID) 600 MG tablet Take 600 mg by mouth 2 times daily (before meals).  metformin (GLUCOPHAGE) 1000 MG tablet Take 1,000 mg by mouth 2 times daily (with meals).         Multiple Vitamin (MULTIVITAMIN PO) Take 1 tablet by mouth daily       alprazolam Virlinda Dux) with device check    Amber Bowden M.D.

## 2017-12-12 ENCOUNTER — OFFICE VISIT (OUTPATIENT)
Dept: CARDIOLOGY CLINIC | Age: 72
End: 2017-12-12

## 2017-12-12 VITALS
HEART RATE: 68 BPM | DIASTOLIC BLOOD PRESSURE: 60 MMHG | SYSTOLIC BLOOD PRESSURE: 124 MMHG | BODY MASS INDEX: 31.32 KG/M2 | WEIGHT: 206 LBS

## 2017-12-12 DIAGNOSIS — Z95.0 PACEMAKER: ICD-10-CM

## 2017-12-12 DIAGNOSIS — I49.5 SSS (SICK SINUS SYNDROME) (HCC): ICD-10-CM

## 2017-12-12 DIAGNOSIS — Z98.61 POST PTCA: ICD-10-CM

## 2017-12-12 DIAGNOSIS — I48.0 PAROXYSMAL ATRIAL FIBRILLATION (HCC): ICD-10-CM

## 2017-12-12 DIAGNOSIS — I25.10 CAD IN NATIVE ARTERY: ICD-10-CM

## 2017-12-12 DIAGNOSIS — I50.32 CHRONIC DIASTOLIC CONGESTIVE HEART FAILURE (HCC): Primary | ICD-10-CM

## 2017-12-12 PROCEDURE — G8417 CALC BMI ABV UP PARAM F/U: HCPCS | Performed by: INTERNAL MEDICINE

## 2017-12-12 PROCEDURE — 99214 OFFICE O/P EST MOD 30 MIN: CPT | Performed by: INTERNAL MEDICINE

## 2017-12-12 PROCEDURE — 1123F ACP DISCUSS/DSCN MKR DOCD: CPT | Performed by: INTERNAL MEDICINE

## 2017-12-12 PROCEDURE — 4040F PNEUMOC VAC/ADMIN/RCVD: CPT | Performed by: INTERNAL MEDICINE

## 2017-12-12 PROCEDURE — G8427 DOCREV CUR MEDS BY ELIG CLIN: HCPCS | Performed by: INTERNAL MEDICINE

## 2017-12-12 PROCEDURE — G8598 ASA/ANTIPLAT THER USED: HCPCS | Performed by: INTERNAL MEDICINE

## 2017-12-12 PROCEDURE — G8484 FLU IMMUNIZE NO ADMIN: HCPCS | Performed by: INTERNAL MEDICINE

## 2017-12-12 PROCEDURE — 1036F TOBACCO NON-USER: CPT | Performed by: INTERNAL MEDICINE

## 2017-12-12 PROCEDURE — 3017F COLORECTAL CA SCREEN DOC REV: CPT | Performed by: INTERNAL MEDICINE

## 2017-12-12 ASSESSMENT — ENCOUNTER SYMPTOMS
CHOKING: 0
APNEA: 0
ABDOMINAL DISTENTION: 0
COUGH: 0
CHEST TIGHTNESS: 0
SHORTNESS OF BREATH: 0

## 2017-12-12 NOTE — PROGRESS NOTES
Subjective:      Patient ID: Nate Holland is a 74 y.o. male. Congestive Heart Failure   Pertinent negatives include no chest pain, fatigue, palpitations or shortness of breath. Here for follow up afib/sss/pacer/htn/cad. Exercising with  2x per wk. BP more variable recently. No chest pain. No sob. Occ afib but rare. No syncope. Past Medical History:   Diagnosis Date    Atrial fibrillation [facility]) July 2012    Saw cardiologist for palps. Event recorder showed AF about 5% of the time. Warfarin started.  CAD (coronary artery disease), native coronary artery Dec 18, 2007    Presented with palpitation to ER. Slight inc. in S. troponin. Cor. angio showed 90% L. circ. Stent placed. (Dr Moulton Seen.  Diabetes mellitus ([facility] [address] 1992    HbA1C in Jennifer '15 was 6.9 on metformin and glipizide.  Essential hypertension since 1980    Difficult to control with need for minoxidil in addition to ARB,CCB and diuretic.  Gout     Podagra intermittently over the years. Uric acid 9.4 in 2011. Allopurinol started (with colchicine for 3 months)    Hypothyroid March 2011    Total thyroidectomy for indeterminate nodule. Proved benign.  Left carotid bruit Oct 2014    Discovered at routine exam. Carotid Doppler showed bilateral stenosis <50%    Peripheral neuropathy ([facility] [address]     2/2 DM. Past Surgical History:   Procedure Laterality Date    CHOLECYSTECTOMY, OPEN  44    CORONARY ANGIOPLASTY  12/17/07    DIAGNOSTIC CARDIAC CATH LAB PROCEDURE  12/17/07    THYROIDECTOMY  2011    Benign    TOTAL KNEE ARTHROPLASTY Right 3/12/14     Social History     Social History    Marital status: Single     Spouse name: N/A    Number of children: N/A    Years of education: N/A     Occupational History    Not on file.      Social History Main Topics    Smoking status: Never Smoker    Smokeless tobacco: Never Used    Alcohol use No    Drug use: No    Sexual activity: Not on file      Comment: Single     Other Topics

## 2017-12-21 RX ORDER — DILTIAZEM HYDROCHLORIDE 120 MG/1
CAPSULE, EXTENDED RELEASE ORAL
Qty: 60 CAPSULE | Refills: 5 | Status: SHIPPED | OUTPATIENT
Start: 2017-12-21 | End: 2018-03-13 | Stop reason: CLARIF

## 2017-12-27 DIAGNOSIS — Z95.0 CARDIAC PACEMAKER IN SITU: ICD-10-CM

## 2017-12-27 DIAGNOSIS — I48.0 PAROXYSMAL ATRIAL FIBRILLATION (HCC): ICD-10-CM

## 2017-12-27 DIAGNOSIS — I25.119 CORONARY ARTERY DISEASE INVOLVING NATIVE CORONARY ARTERY OF NATIVE HEART WITH ANGINA PECTORIS (HCC): ICD-10-CM

## 2017-12-27 LAB
INR BLD: 3.53 (ref 0.85–1.15)
PROTHROMBIN TIME: 39.9 SEC (ref 9.6–13)

## 2018-01-02 ENCOUNTER — TELEPHONE (OUTPATIENT)
Dept: CARDIOLOGY CLINIC | Age: 73
End: 2018-01-02

## 2018-01-02 ENCOUNTER — ANTI-COAG VISIT (OUTPATIENT)
Dept: CARDIOLOGY CLINIC | Age: 73
End: 2018-01-02

## 2018-01-02 DIAGNOSIS — I48.0 PAROXYSMAL ATRIAL FIBRILLATION (HCC): ICD-10-CM

## 2018-01-03 ENCOUNTER — ANTI-COAG VISIT (OUTPATIENT)
Dept: CARDIOLOGY CLINIC | Age: 73
End: 2018-01-03

## 2018-01-03 DIAGNOSIS — I48.0 PAROXYSMAL ATRIAL FIBRILLATION (HCC): ICD-10-CM

## 2018-01-03 DIAGNOSIS — I25.119 CORONARY ARTERY DISEASE INVOLVING NATIVE CORONARY ARTERY OF NATIVE HEART WITH ANGINA PECTORIS (HCC): ICD-10-CM

## 2018-01-03 DIAGNOSIS — Z95.0 CARDIAC PACEMAKER IN SITU: ICD-10-CM

## 2018-01-03 LAB
INR BLD: 3.83 (ref 0.85–1.15)
PROTHROMBIN TIME: 43.3 SEC (ref 9.6–13)

## 2018-01-03 NOTE — PATIENT INSTRUCTIONS
Old Dose: Take 7.5 mg Sun, Tues, Wed, Sat; take 5 mg all other days   New dose:   Take 7.5 mg Sun, Tues, Sat; take 5 mg all other days  Recheck 1 week  Pt verbalized understanding

## 2018-01-10 ENCOUNTER — ANTI-COAG VISIT (OUTPATIENT)
Dept: CARDIOLOGY CLINIC | Age: 73
End: 2018-01-10

## 2018-01-10 DIAGNOSIS — I48.0 PAROXYSMAL ATRIAL FIBRILLATION (HCC): ICD-10-CM

## 2018-01-10 DIAGNOSIS — Z95.0 CARDIAC PACEMAKER IN SITU: ICD-10-CM

## 2018-01-10 DIAGNOSIS — I25.119 CORONARY ARTERY DISEASE INVOLVING NATIVE CORONARY ARTERY OF NATIVE HEART WITH ANGINA PECTORIS (HCC): ICD-10-CM

## 2018-01-10 LAB
INR BLD: 3.04 (ref 0.85–1.15)
PROTHROMBIN TIME: 34.4 SEC (ref 9.6–13)

## 2018-01-10 NOTE — TELEPHONE ENCOUNTER
Requested Prescriptions     Pending Prescriptions Disp Refills    sotalol (BETAPACE) 80 MG tablet 90 tablet 3     Sig: Take 0.5 tablets by mouth 2 times daily         Last ov:12/12/17    Next ov:3/13/18    Last fill:1/26/17

## 2018-01-10 NOTE — PATIENT INSTRUCTIONS
Old Dose: Take 7.5 mg Sun, Tues, Wed, Sat; take 5 mg all other days (per pt this is how took last week)  New dose:   Take 7.5 mg Sun, Tues, Sat; take 5 mg all other days  Recheck 1 week  Pt verbalized understanding

## 2018-01-12 RX ORDER — SOTALOL HYDROCHLORIDE 80 MG/1
40 TABLET ORAL 2 TIMES DAILY
Qty: 90 TABLET | Refills: 3 | Status: SHIPPED | OUTPATIENT
Start: 2018-01-12 | End: 2019-01-11 | Stop reason: SDUPTHER

## 2018-01-17 DIAGNOSIS — I48.0 PAROXYSMAL ATRIAL FIBRILLATION (HCC): ICD-10-CM

## 2018-01-17 DIAGNOSIS — Z95.0 CARDIAC PACEMAKER IN SITU: ICD-10-CM

## 2018-01-17 DIAGNOSIS — I25.119 CORONARY ARTERY DISEASE INVOLVING NATIVE CORONARY ARTERY OF NATIVE HEART WITH ANGINA PECTORIS (HCC): ICD-10-CM

## 2018-01-17 LAB
INR BLD: 3.25
INR BLD: 3.25 (ref 0.85–1.15)
PROTHROMBIN TIME: 36.7 SEC (ref 9.6–13)

## 2018-01-18 ENCOUNTER — ANTI-COAG VISIT (OUTPATIENT)
Dept: CARDIOLOGY CLINIC | Age: 73
End: 2018-01-18

## 2018-01-18 DIAGNOSIS — I48.0 PAROXYSMAL ATRIAL FIBRILLATION (HCC): ICD-10-CM

## 2018-01-25 DIAGNOSIS — Z95.0 CARDIAC PACEMAKER IN SITU: ICD-10-CM

## 2018-01-25 DIAGNOSIS — I48.0 PAROXYSMAL ATRIAL FIBRILLATION (HCC): ICD-10-CM

## 2018-01-25 DIAGNOSIS — I25.119 CORONARY ARTERY DISEASE INVOLVING NATIVE CORONARY ARTERY OF NATIVE HEART WITH ANGINA PECTORIS (HCC): ICD-10-CM

## 2018-01-25 LAB
INR BLD: 2.6 (ref 0.85–1.15)
PROTHROMBIN TIME: 29.4 SEC (ref 9.6–13)

## 2018-01-26 ENCOUNTER — ANTI-COAG VISIT (OUTPATIENT)
Dept: CARDIOLOGY CLINIC | Age: 73
End: 2018-01-26

## 2018-01-26 DIAGNOSIS — I48.0 PAROXYSMAL ATRIAL FIBRILLATION (HCC): ICD-10-CM

## 2018-01-31 NOTE — TELEPHONE ENCOUNTER
Medication: minoxidil    Strength: 10 mg     Last visit: 12/12/2017    Next visit: 3/13/2018    Last refill:

## 2018-02-01 RX ORDER — MINOXIDIL 10 MG/1
TABLET ORAL
Qty: 60 TABLET | Refills: 5 | Status: SHIPPED | OUTPATIENT
Start: 2018-02-01 | End: 2018-11-05 | Stop reason: SDUPTHER

## 2018-02-15 DIAGNOSIS — I48.0 PAROXYSMAL ATRIAL FIBRILLATION (HCC): ICD-10-CM

## 2018-02-15 DIAGNOSIS — I25.119 CORONARY ARTERY DISEASE INVOLVING NATIVE CORONARY ARTERY OF NATIVE HEART WITH ANGINA PECTORIS (HCC): ICD-10-CM

## 2018-02-15 DIAGNOSIS — Z95.0 CARDIAC PACEMAKER IN SITU: ICD-10-CM

## 2018-02-15 LAB
INR BLD: 3.35 (ref 0.85–1.15)
PROTHROMBIN TIME: 37.8 SEC (ref 9.6–13)

## 2018-02-16 ENCOUNTER — ANTI-COAG VISIT (OUTPATIENT)
Dept: CARDIOLOGY CLINIC | Age: 73
End: 2018-02-16

## 2018-02-16 DIAGNOSIS — I48.0 PAROXYSMAL ATRIAL FIBRILLATION (HCC): ICD-10-CM

## 2018-02-16 NOTE — PATIENT INSTRUCTIONS
Old Dose:Take 7.5 mg Tues, Sat; take 5 mg all other days  New doseTake 7.5 mg Tues take 5 mg all other days:   Recheck 1 week  LVHÉCTOR w instructions and CB #

## 2018-02-23 DIAGNOSIS — I48.0 PAROXYSMAL ATRIAL FIBRILLATION (HCC): ICD-10-CM

## 2018-02-23 DIAGNOSIS — I25.119 CORONARY ARTERY DISEASE INVOLVING NATIVE CORONARY ARTERY OF NATIVE HEART WITH ANGINA PECTORIS (HCC): ICD-10-CM

## 2018-02-23 DIAGNOSIS — Z95.0 CARDIAC PACEMAKER IN SITU: ICD-10-CM

## 2018-02-23 LAB
INR BLD: 2.19
INR BLD: 2.19 (ref 0.85–1.15)
PROTHROMBIN TIME: 24.8 SEC (ref 9.6–13)

## 2018-02-26 ENCOUNTER — ANTI-COAG VISIT (OUTPATIENT)
Dept: CARDIOLOGY CLINIC | Age: 73
End: 2018-02-26

## 2018-02-26 DIAGNOSIS — I48.0 PAROXYSMAL ATRIAL FIBRILLATION (HCC): ICD-10-CM

## 2018-03-02 RX ORDER — WARFARIN SODIUM 5 MG/1
TABLET ORAL
Qty: 30 TABLET | Refills: 5 | Status: SHIPPED | OUTPATIENT
Start: 2018-03-02 | End: 2018-09-12 | Stop reason: SDUPTHER

## 2018-03-08 DIAGNOSIS — I48.0 PAROXYSMAL ATRIAL FIBRILLATION (HCC): ICD-10-CM

## 2018-03-08 DIAGNOSIS — I25.119 CORONARY ARTERY DISEASE INVOLVING NATIVE CORONARY ARTERY OF NATIVE HEART WITH ANGINA PECTORIS (HCC): ICD-10-CM

## 2018-03-08 DIAGNOSIS — Z95.0 CARDIAC PACEMAKER IN SITU: ICD-10-CM

## 2018-03-08 LAB
INR BLD: 2.49 (ref 0.85–1.15)
PROTHROMBIN TIME: 28.1 SEC (ref 9.6–13)

## 2018-03-09 ENCOUNTER — ANTI-COAG VISIT (OUTPATIENT)
Dept: CARDIOLOGY CLINIC | Age: 73
End: 2018-03-09

## 2018-03-09 DIAGNOSIS — I48.0 PAROXYSMAL ATRIAL FIBRILLATION (HCC): ICD-10-CM

## 2018-03-13 ENCOUNTER — NURSE ONLY (OUTPATIENT)
Dept: CARDIOLOGY CLINIC | Age: 73
End: 2018-03-13

## 2018-03-13 ENCOUNTER — OFFICE VISIT (OUTPATIENT)
Dept: CARDIOLOGY CLINIC | Age: 73
End: 2018-03-13

## 2018-03-13 VITALS
HEART RATE: 68 BPM | WEIGHT: 207 LBS | BODY MASS INDEX: 31.47 KG/M2 | SYSTOLIC BLOOD PRESSURE: 154 MMHG | DIASTOLIC BLOOD PRESSURE: 80 MMHG

## 2018-03-13 DIAGNOSIS — I49.5 SICK SINUS SYNDROME (HCC): ICD-10-CM

## 2018-03-13 DIAGNOSIS — I48.0 PAROXYSMAL ATRIAL FIBRILLATION (HCC): ICD-10-CM

## 2018-03-13 DIAGNOSIS — Z95.0 PACEMAKER: ICD-10-CM

## 2018-03-13 DIAGNOSIS — I49.5 SSS (SICK SINUS SYNDROME) (HCC): ICD-10-CM

## 2018-03-13 DIAGNOSIS — I25.10 CAD IN NATIVE ARTERY: ICD-10-CM

## 2018-03-13 DIAGNOSIS — Z95.0 CARDIAC PACEMAKER IN SITU: ICD-10-CM

## 2018-03-13 DIAGNOSIS — Z98.61 POST PTCA: ICD-10-CM

## 2018-03-13 DIAGNOSIS — I50.32 CHRONIC DIASTOLIC CONGESTIVE HEART FAILURE (HCC): Primary | ICD-10-CM

## 2018-03-13 DIAGNOSIS — R00.1 BRADYCARDIA: ICD-10-CM

## 2018-03-13 PROCEDURE — G8484 FLU IMMUNIZE NO ADMIN: HCPCS | Performed by: INTERNAL MEDICINE

## 2018-03-13 PROCEDURE — 93296 REM INTERROG EVL PM/IDS: CPT | Performed by: INTERNAL MEDICINE

## 2018-03-13 PROCEDURE — 1123F ACP DISCUSS/DSCN MKR DOCD: CPT | Performed by: INTERNAL MEDICINE

## 2018-03-13 PROCEDURE — G8427 DOCREV CUR MEDS BY ELIG CLIN: HCPCS | Performed by: INTERNAL MEDICINE

## 2018-03-13 PROCEDURE — G8417 CALC BMI ABV UP PARAM F/U: HCPCS | Performed by: INTERNAL MEDICINE

## 2018-03-13 PROCEDURE — 99214 OFFICE O/P EST MOD 30 MIN: CPT | Performed by: INTERNAL MEDICINE

## 2018-03-13 PROCEDURE — G8598 ASA/ANTIPLAT THER USED: HCPCS | Performed by: INTERNAL MEDICINE

## 2018-03-13 PROCEDURE — 4040F PNEUMOC VAC/ADMIN/RCVD: CPT | Performed by: INTERNAL MEDICINE

## 2018-03-13 PROCEDURE — 93294 REM INTERROG EVL PM/LDLS PM: CPT | Performed by: INTERNAL MEDICINE

## 2018-03-13 PROCEDURE — 1036F TOBACCO NON-USER: CPT | Performed by: INTERNAL MEDICINE

## 2018-03-13 PROCEDURE — 3017F COLORECTAL CA SCREEN DOC REV: CPT | Performed by: INTERNAL MEDICINE

## 2018-03-13 RX ORDER — DILTIAZEM HYDROCHLORIDE 120 MG/1
240 CAPSULE, COATED, EXTENDED RELEASE ORAL DAILY
Qty: 60 CAPSULE | Refills: 5 | Status: SHIPPED | OUTPATIENT
Start: 2018-03-13 | End: 2018-12-19

## 2018-03-13 ASSESSMENT — ENCOUNTER SYMPTOMS
CHEST TIGHTNESS: 0
CHOKING: 0
COUGH: 0
SHORTNESS OF BREATH: 0
APNEA: 0
ABDOMINAL DISTENTION: 0

## 2018-03-13 NOTE — PROGRESS NOTES
Subjective:      Patient ID: Camila Block is a 74 y.o. male. Congestive Heart Failure   Pertinent negatives include no chest pain, fatigue, palpitations or shortness of breath. Here for follow up afib/sss/pacer/htn/cad. Some sob with activities though bp up. Exercising with  2x per wk. BP more variable again. No chest pain. No sob. Occ afib but rare. No syncope. Past Medical History:   Diagnosis Date    Atrial fibrillation [facility]) July 2012    Saw cardiologist for palps. Event recorder showed AF about 5% of the time. Warfarin started.  CAD (coronary artery disease), native coronary artery Dec 18, 2007    Presented with palpitation to ER. Slight inc. in S. troponin. Cor. angio showed 90% L. circ. Stent placed. (Dr Rasta Gresham.  Diabetes mellitus ([facility] [address] 1992    HbA1C in Jennifer '15 was 6.9 on metformin and glipizide.  Essential hypertension since 1980    Difficult to control with need for minoxidil in addition to ARB,CCB and diuretic.  Gout     Podagra intermittently over the years. Uric acid 9.4 in 2011. Allopurinol started (with colchicine for 3 months)    Hypothyroid March 2011    Total thyroidectomy for indeterminate nodule. Proved benign.  Left carotid bruit Oct 2014    Discovered at routine exam. Carotid Doppler showed bilateral stenosis <50%    Peripheral neuropathy ([facility] [address]     2/2 DM. Past Surgical History:   Procedure Laterality Date    CHOLECYSTECTOMY, OPEN  46    CORONARY ANGIOPLASTY  12/17/07    DIAGNOSTIC CARDIAC CATH LAB PROCEDURE  12/17/07    THYROIDECTOMY  2011    Benign    TOTAL KNEE ARTHROPLASTY Right 3/12/14     Social History     Social History    Marital status: Single     Spouse name: N/A    Number of children: N/A    Years of education: N/A     Occupational History    Not on file.      Social History Main Topics    Smoking status: Never Smoker    Smokeless tobacco: Never Used    Alcohol use No    Drug use: No    Sexual activity: Not on file

## 2018-04-12 DIAGNOSIS — I25.119 CORONARY ARTERY DISEASE INVOLVING NATIVE CORONARY ARTERY OF NATIVE HEART WITH ANGINA PECTORIS (HCC): ICD-10-CM

## 2018-04-12 DIAGNOSIS — I48.0 PAROXYSMAL ATRIAL FIBRILLATION (HCC): ICD-10-CM

## 2018-04-12 DIAGNOSIS — Z95.0 CARDIAC PACEMAKER IN SITU: ICD-10-CM

## 2018-04-12 LAB
INR BLD: 4.73 (ref 0.85–1.15)
PROTHROMBIN TIME: 53.4 SEC (ref 9.6–13)

## 2018-04-13 ENCOUNTER — ANTI-COAG VISIT (OUTPATIENT)
Dept: CARDIOLOGY CLINIC | Age: 73
End: 2018-04-13

## 2018-04-13 DIAGNOSIS — I48.0 PAROXYSMAL ATRIAL FIBRILLATION (HCC): ICD-10-CM

## 2018-04-16 ENCOUNTER — ANTI-COAG VISIT (OUTPATIENT)
Dept: CARDIOLOGY CLINIC | Age: 73
End: 2018-04-16

## 2018-04-16 DIAGNOSIS — I48.0 PAROXYSMAL ATRIAL FIBRILLATION (HCC): ICD-10-CM

## 2018-04-16 DIAGNOSIS — I25.119 CORONARY ARTERY DISEASE INVOLVING NATIVE CORONARY ARTERY OF NATIVE HEART WITH ANGINA PECTORIS (HCC): ICD-10-CM

## 2018-04-16 DIAGNOSIS — Z95.0 CARDIAC PACEMAKER IN SITU: ICD-10-CM

## 2018-04-16 LAB
INR BLD: 2.04 (ref 0.85–1.15)
PROTHROMBIN TIME: 23 SEC (ref 9.6–13)

## 2018-04-24 ENCOUNTER — OFFICE VISIT (OUTPATIENT)
Dept: CARDIOLOGY CLINIC | Age: 73
End: 2018-04-24

## 2018-04-24 VITALS
HEART RATE: 68 BPM | BODY MASS INDEX: 31.63 KG/M2 | DIASTOLIC BLOOD PRESSURE: 70 MMHG | WEIGHT: 208 LBS | SYSTOLIC BLOOD PRESSURE: 130 MMHG

## 2018-04-24 DIAGNOSIS — I49.5 SSS (SICK SINUS SYNDROME) (HCC): ICD-10-CM

## 2018-04-24 DIAGNOSIS — I50.32 CHRONIC DIASTOLIC CONGESTIVE HEART FAILURE (HCC): ICD-10-CM

## 2018-04-24 DIAGNOSIS — I10 ESSENTIAL HYPERTENSION: Primary | ICD-10-CM

## 2018-04-24 DIAGNOSIS — Z98.61 HISTORY OF PTCA: ICD-10-CM

## 2018-04-24 DIAGNOSIS — I48.0 PAROXYSMAL ATRIAL FIBRILLATION (HCC): ICD-10-CM

## 2018-04-24 DIAGNOSIS — Z95.0 PACEMAKER: ICD-10-CM

## 2018-04-24 DIAGNOSIS — I25.119 CORONARY ARTERY DISEASE INVOLVING NATIVE CORONARY ARTERY OF NATIVE HEART WITH ANGINA PECTORIS (HCC): ICD-10-CM

## 2018-04-24 DIAGNOSIS — I25.10 CAD IN NATIVE ARTERY: ICD-10-CM

## 2018-04-24 DIAGNOSIS — Z95.0 CARDIAC PACEMAKER IN SITU: ICD-10-CM

## 2018-04-24 LAB
INR BLD: 2.27 (ref 0.85–1.15)
PROTHROMBIN TIME: 25.6 SEC (ref 9.6–13)

## 2018-04-24 PROCEDURE — 3017F COLORECTAL CA SCREEN DOC REV: CPT | Performed by: INTERNAL MEDICINE

## 2018-04-24 PROCEDURE — 99214 OFFICE O/P EST MOD 30 MIN: CPT | Performed by: INTERNAL MEDICINE

## 2018-04-24 PROCEDURE — G8417 CALC BMI ABV UP PARAM F/U: HCPCS | Performed by: INTERNAL MEDICINE

## 2018-04-24 PROCEDURE — 1123F ACP DISCUSS/DSCN MKR DOCD: CPT | Performed by: INTERNAL MEDICINE

## 2018-04-24 PROCEDURE — 1036F TOBACCO NON-USER: CPT | Performed by: INTERNAL MEDICINE

## 2018-04-24 PROCEDURE — 4040F PNEUMOC VAC/ADMIN/RCVD: CPT | Performed by: INTERNAL MEDICINE

## 2018-04-24 PROCEDURE — G8427 DOCREV CUR MEDS BY ELIG CLIN: HCPCS | Performed by: INTERNAL MEDICINE

## 2018-04-24 PROCEDURE — G8598 ASA/ANTIPLAT THER USED: HCPCS | Performed by: INTERNAL MEDICINE

## 2018-04-24 RX ORDER — CETIRIZINE HYDROCHLORIDE 10 MG/1
10 TABLET ORAL DAILY
COMMUNITY
End: 2021-10-10

## 2018-04-24 ASSESSMENT — ENCOUNTER SYMPTOMS
SHORTNESS OF BREATH: 0
COUGH: 0
CHEST TIGHTNESS: 0
APNEA: 0
CHOKING: 0
ABDOMINAL DISTENTION: 0

## 2018-04-25 ENCOUNTER — ANTI-COAG VISIT (OUTPATIENT)
Dept: CARDIOLOGY CLINIC | Age: 73
End: 2018-04-25

## 2018-04-25 DIAGNOSIS — I48.0 PAROXYSMAL ATRIAL FIBRILLATION (HCC): ICD-10-CM

## 2018-05-22 ENCOUNTER — TELEPHONE (OUTPATIENT)
Dept: CARDIOLOGY CLINIC | Age: 73
End: 2018-05-22

## 2018-05-22 DIAGNOSIS — I48.0 PAROXYSMAL ATRIAL FIBRILLATION (HCC): ICD-10-CM

## 2018-05-22 DIAGNOSIS — I48.0 PAROXYSMAL ATRIAL FIBRILLATION (HCC): Primary | ICD-10-CM

## 2018-05-22 LAB
INR BLD: 3.35 (ref 0.85–1.15)
PROTHROMBIN TIME: 37.9 SEC (ref 9.6–13)

## 2018-05-23 ENCOUNTER — ANTI-COAG VISIT (OUTPATIENT)
Dept: CARDIOLOGY CLINIC | Age: 73
End: 2018-05-23

## 2018-05-23 DIAGNOSIS — I48.0 PAROXYSMAL ATRIAL FIBRILLATION (HCC): ICD-10-CM

## 2018-05-29 DIAGNOSIS — I48.0 PAROXYSMAL ATRIAL FIBRILLATION (HCC): ICD-10-CM

## 2018-05-29 LAB
INR BLD: 1.93 (ref 0.85–1.15)
PROTHROMBIN TIME: 21.8 SEC (ref 9.6–13)

## 2018-05-31 ENCOUNTER — ANTI-COAG VISIT (OUTPATIENT)
Dept: CARDIOLOGY CLINIC | Age: 73
End: 2018-05-31

## 2018-05-31 DIAGNOSIS — I48.0 PAROXYSMAL ATRIAL FIBRILLATION (HCC): ICD-10-CM

## 2018-06-06 DIAGNOSIS — I48.0 PAROXYSMAL ATRIAL FIBRILLATION (HCC): ICD-10-CM

## 2018-06-06 LAB
INR BLD: 2.47 (ref 0.86–1.14)
PROTHROMBIN TIME: 28.2 SEC (ref 9.8–13)

## 2018-06-07 ENCOUNTER — ANTI-COAG VISIT (OUTPATIENT)
Dept: CARDIOLOGY CLINIC | Age: 73
End: 2018-06-07

## 2018-06-07 DIAGNOSIS — I48.0 PAROXYSMAL ATRIAL FIBRILLATION (HCC): ICD-10-CM

## 2018-06-12 ENCOUNTER — NURSE ONLY (OUTPATIENT)
Dept: CARDIOLOGY CLINIC | Age: 73
End: 2018-06-12

## 2018-06-12 DIAGNOSIS — Z95.0 CARDIAC PACEMAKER IN SITU: ICD-10-CM

## 2018-06-12 DIAGNOSIS — R00.1 BRADYCARDIA: ICD-10-CM

## 2018-06-12 DIAGNOSIS — I49.5 SICK SINUS SYNDROME (HCC): ICD-10-CM

## 2018-06-12 DIAGNOSIS — I48.0 PAROXYSMAL ATRIAL FIBRILLATION (HCC): ICD-10-CM

## 2018-06-13 PROCEDURE — 93294 REM INTERROG EVL PM/LDLS PM: CPT | Performed by: INTERNAL MEDICINE

## 2018-06-13 PROCEDURE — 93296 REM INTERROG EVL PM/IDS: CPT | Performed by: INTERNAL MEDICINE

## 2018-06-20 DIAGNOSIS — I48.0 PAROXYSMAL ATRIAL FIBRILLATION (HCC): ICD-10-CM

## 2018-06-20 LAB
INR BLD: 2.1 (ref 0.86–1.14)
PROTHROMBIN TIME: 23.9 SEC (ref 9.8–13)

## 2018-06-22 ENCOUNTER — ANTI-COAG VISIT (OUTPATIENT)
Dept: CARDIOLOGY CLINIC | Age: 73
End: 2018-06-22

## 2018-06-22 DIAGNOSIS — I48.0 PAROXYSMAL ATRIAL FIBRILLATION (HCC): ICD-10-CM

## 2018-07-03 DIAGNOSIS — I48.0 PAROXYSMAL ATRIAL FIBRILLATION (HCC): ICD-10-CM

## 2018-07-03 LAB
INR BLD: 2.6 (ref 0.86–1.14)
PROTHROMBIN TIME: 29.6 SEC (ref 9.8–13)

## 2018-07-05 ENCOUNTER — ANTI-COAG VISIT (OUTPATIENT)
Dept: CARDIOLOGY CLINIC | Age: 73
End: 2018-07-05

## 2018-07-05 DIAGNOSIS — I48.0 PAROXYSMAL ATRIAL FIBRILLATION (HCC): ICD-10-CM

## 2018-07-18 DIAGNOSIS — I48.0 PAROXYSMAL ATRIAL FIBRILLATION (HCC): ICD-10-CM

## 2018-07-18 LAB
INR BLD: 1.99 (ref 0.86–1.14)
PROTHROMBIN TIME: 22.7 SEC (ref 9.8–13)

## 2018-07-19 ENCOUNTER — ANTI-COAG VISIT (OUTPATIENT)
Dept: CARDIOLOGY CLINIC | Age: 73
End: 2018-07-19

## 2018-07-19 DIAGNOSIS — I48.0 PAROXYSMAL ATRIAL FIBRILLATION (HCC): ICD-10-CM

## 2018-07-25 DIAGNOSIS — I48.0 PAROXYSMAL ATRIAL FIBRILLATION (HCC): ICD-10-CM

## 2018-07-25 LAB
INR BLD: 2.47 (ref 0.86–1.14)
PROTHROMBIN TIME: 28.2 SEC (ref 9.8–13)

## 2018-07-26 ENCOUNTER — ANTI-COAG VISIT (OUTPATIENT)
Dept: CARDIOLOGY CLINIC | Age: 73
End: 2018-07-26

## 2018-07-26 DIAGNOSIS — I48.0 PAROXYSMAL ATRIAL FIBRILLATION (HCC): ICD-10-CM

## 2018-07-26 NOTE — PATIENT INSTRUCTIONS
Paroxysmal atrial fibrillation  Old Dose: 5mg daily  New dose: no change  Recheck 1 week  Spoke with pt who verbalized understanding.

## 2018-08-01 ENCOUNTER — OFFICE VISIT (OUTPATIENT)
Dept: CARDIOLOGY CLINIC | Age: 73
End: 2018-08-01

## 2018-08-01 VITALS
HEART RATE: 60 BPM | SYSTOLIC BLOOD PRESSURE: 138 MMHG | DIASTOLIC BLOOD PRESSURE: 64 MMHG | WEIGHT: 205.8 LBS | BODY MASS INDEX: 31.29 KG/M2

## 2018-08-01 DIAGNOSIS — I50.32 CHRONIC DIASTOLIC CONGESTIVE HEART FAILURE (HCC): ICD-10-CM

## 2018-08-01 DIAGNOSIS — I49.5 SSS (SICK SINUS SYNDROME) (HCC): ICD-10-CM

## 2018-08-01 DIAGNOSIS — I25.10 CAD IN NATIVE ARTERY: ICD-10-CM

## 2018-08-01 DIAGNOSIS — I10 ESSENTIAL HYPERTENSION: Primary | ICD-10-CM

## 2018-08-01 DIAGNOSIS — I48.0 PAROXYSMAL ATRIAL FIBRILLATION (HCC): ICD-10-CM

## 2018-08-01 DIAGNOSIS — Z98.61 HISTORY OF PTCA: ICD-10-CM

## 2018-08-01 DIAGNOSIS — Z95.0 PACEMAKER: ICD-10-CM

## 2018-08-01 LAB
INR BLD: 1.97 (ref 0.86–1.14)
PROTHROMBIN TIME: 22.5 SEC (ref 9.8–13)

## 2018-08-01 PROCEDURE — G8417 CALC BMI ABV UP PARAM F/U: HCPCS | Performed by: INTERNAL MEDICINE

## 2018-08-01 PROCEDURE — 1123F ACP DISCUSS/DSCN MKR DOCD: CPT | Performed by: INTERNAL MEDICINE

## 2018-08-01 PROCEDURE — 1101F PT FALLS ASSESS-DOCD LE1/YR: CPT | Performed by: INTERNAL MEDICINE

## 2018-08-01 PROCEDURE — 1036F TOBACCO NON-USER: CPT | Performed by: INTERNAL MEDICINE

## 2018-08-01 PROCEDURE — 4040F PNEUMOC VAC/ADMIN/RCVD: CPT | Performed by: INTERNAL MEDICINE

## 2018-08-01 PROCEDURE — G8427 DOCREV CUR MEDS BY ELIG CLIN: HCPCS | Performed by: INTERNAL MEDICINE

## 2018-08-01 PROCEDURE — 99214 OFFICE O/P EST MOD 30 MIN: CPT | Performed by: INTERNAL MEDICINE

## 2018-08-01 PROCEDURE — 3017F COLORECTAL CA SCREEN DOC REV: CPT | Performed by: INTERNAL MEDICINE

## 2018-08-01 PROCEDURE — G8598 ASA/ANTIPLAT THER USED: HCPCS | Performed by: INTERNAL MEDICINE

## 2018-08-01 ASSESSMENT — ENCOUNTER SYMPTOMS
ABDOMINAL DISTENTION: 0
SHORTNESS OF BREATH: 1
APNEA: 0
CHEST TIGHTNESS: 0
COUGH: 0
CHOKING: 0

## 2018-08-02 ENCOUNTER — ANTI-COAG VISIT (OUTPATIENT)
Dept: CARDIOLOGY CLINIC | Age: 73
End: 2018-08-02

## 2018-08-02 DIAGNOSIS — I48.0 PAROXYSMAL ATRIAL FIBRILLATION (HCC): ICD-10-CM

## 2018-08-02 LAB — INR BLD: 1.97

## 2018-08-02 NOTE — PATIENT INSTRUCTIONS
Paroxysmal atrial fibrillation  Old Dose: 5mg daily  New dose: per pt request resume old dose 7.5 mg Thursday 5 mg all other days   Recheck 1 week  Spoke with pt who verbalized understanding.

## 2018-08-08 DIAGNOSIS — I48.0 PAROXYSMAL ATRIAL FIBRILLATION (HCC): ICD-10-CM

## 2018-08-08 LAB
INR BLD: 2.17 (ref 0.86–1.14)
PROTHROMBIN TIME: 24.7 SEC (ref 9.8–13)

## 2018-08-09 ENCOUNTER — ANTI-COAG VISIT (OUTPATIENT)
Dept: CARDIOLOGY CLINIC | Age: 73
End: 2018-08-09

## 2018-08-09 DIAGNOSIS — I48.0 PAROXYSMAL ATRIAL FIBRILLATION (HCC): ICD-10-CM

## 2018-08-09 NOTE — PATIENT INSTRUCTIONS
Paroxysmal atrial fibrillation  Old Dose: 7.5 mg Thursday 5 mg all other days  New dose: no change  Recheck 1 week  Spoke with pt who verbalized understanding.

## 2018-08-16 ENCOUNTER — ANTI-COAG VISIT (OUTPATIENT)
Dept: CARDIOLOGY CLINIC | Age: 73
End: 2018-08-16

## 2018-08-16 DIAGNOSIS — I48.0 PAROXYSMAL ATRIAL FIBRILLATION (HCC): ICD-10-CM

## 2018-08-16 LAB
INR BLD: 1.86
INR BLD: 1.86 (ref 0.86–1.14)
PROTHROMBIN TIME: 21.2 SEC (ref 9.8–13)

## 2018-08-23 DIAGNOSIS — I48.0 PAROXYSMAL ATRIAL FIBRILLATION (HCC): ICD-10-CM

## 2018-08-23 LAB
INR BLD: 2.71 (ref 0.86–1.14)
PROTHROMBIN TIME: 30.9 SEC (ref 9.8–13)

## 2018-08-24 ENCOUNTER — ANTI-COAG VISIT (OUTPATIENT)
Dept: CARDIOLOGY CLINIC | Age: 73
End: 2018-08-24

## 2018-08-24 DIAGNOSIS — I48.0 PAROXYSMAL ATRIAL FIBRILLATION (HCC): ICD-10-CM

## 2018-08-24 NOTE — PATIENT INSTRUCTIONS
Paroxysmal atrial fibrillation  Old Dose: 7.5 mg Thurs and Sat. 5 mg all the other days  New dose: no change  Recheck 1 week  Pt called back, instructions given he verbalized understanding.

## 2018-08-30 DIAGNOSIS — I48.0 PAROXYSMAL ATRIAL FIBRILLATION (HCC): ICD-10-CM

## 2018-08-30 LAB
INR BLD: 2.48 (ref 0.86–1.14)
PROTHROMBIN TIME: 28.3 SEC (ref 9.8–13)

## 2018-08-31 ENCOUNTER — ANTI-COAG VISIT (OUTPATIENT)
Dept: CARDIOLOGY CLINIC | Age: 73
End: 2018-08-31

## 2018-08-31 DIAGNOSIS — I48.0 PAROXYSMAL ATRIAL FIBRILLATION (HCC): ICD-10-CM

## 2018-08-31 NOTE — PATIENT INSTRUCTIONS
Paroxysmal atrial fibrillation  Old Dose: 7.5 mg Thurs and Sat. 5 mg all the other days  New dose: no change  Recheck 2 week  Pt called back, instructions given he verbalized understanding.

## 2018-09-11 ENCOUNTER — NURSE ONLY (OUTPATIENT)
Dept: CARDIOLOGY CLINIC | Age: 73
End: 2018-09-11

## 2018-09-11 DIAGNOSIS — Z95.0 CARDIAC PACEMAKER IN SITU: ICD-10-CM

## 2018-09-11 DIAGNOSIS — R00.1 BRADYCARDIA: ICD-10-CM

## 2018-09-13 ENCOUNTER — TELEPHONE (OUTPATIENT)
Dept: CARDIOLOGY CLINIC | Age: 73
End: 2018-09-13

## 2018-09-13 PROCEDURE — 93294 REM INTERROG EVL PM/LDLS PM: CPT | Performed by: INTERNAL MEDICINE

## 2018-09-13 PROCEDURE — 93296 REM INTERROG EVL PM/IDS: CPT | Performed by: INTERNAL MEDICINE

## 2018-09-13 RX ORDER — WARFARIN SODIUM 5 MG/1
TABLET ORAL
Qty: 30 TABLET | Refills: 5 | Status: SHIPPED | OUTPATIENT
Start: 2018-09-13 | End: 2022-03-30 | Stop reason: ALTCHOICE

## 2018-09-14 ENCOUNTER — ANTI-COAG VISIT (OUTPATIENT)
Dept: CARDIOLOGY CLINIC | Age: 73
End: 2018-09-14

## 2018-09-14 DIAGNOSIS — I48.91 ATRIAL FIBRILLATION, UNSPECIFIED TYPE (HCC): ICD-10-CM

## 2018-09-14 PROCEDURE — 93793 ANTICOAG MGMT PT WARFARIN: CPT | Performed by: NURSE PRACTITIONER

## 2018-10-11 ENCOUNTER — ANTI-COAG VISIT (OUTPATIENT)
Dept: CARDIOLOGY CLINIC | Age: 73
End: 2018-10-11
Payer: MEDICARE

## 2018-10-11 DIAGNOSIS — I48.91 ATRIAL FIBRILLATION, UNSPECIFIED TYPE (HCC): ICD-10-CM

## 2018-10-11 PROCEDURE — 93793 ANTICOAG MGMT PT WARFARIN: CPT | Performed by: NURSE PRACTITIONER

## 2018-10-23 ENCOUNTER — OFFICE VISIT (OUTPATIENT)
Dept: CARDIOLOGY CLINIC | Age: 73
End: 2018-10-23
Payer: MEDICARE

## 2018-10-23 VITALS
RESPIRATION RATE: 16 BRPM | HEART RATE: 60 BPM | SYSTOLIC BLOOD PRESSURE: 130 MMHG | DIASTOLIC BLOOD PRESSURE: 80 MMHG | BODY MASS INDEX: 31.22 KG/M2 | WEIGHT: 206 LBS | OXYGEN SATURATION: 97 % | HEIGHT: 68 IN

## 2018-10-23 DIAGNOSIS — I25.119 CORONARY ARTERY DISEASE INVOLVING NATIVE CORONARY ARTERY OF NATIVE HEART WITH ANGINA PECTORIS (HCC): ICD-10-CM

## 2018-10-23 DIAGNOSIS — I49.5 SSS (SICK SINUS SYNDROME) (HCC): ICD-10-CM

## 2018-10-23 DIAGNOSIS — Z95.0 PACEMAKER: ICD-10-CM

## 2018-10-23 DIAGNOSIS — I50.32 CHRONIC DIASTOLIC CONGESTIVE HEART FAILURE (HCC): ICD-10-CM

## 2018-10-23 DIAGNOSIS — I25.10 CAD IN NATIVE ARTERY: ICD-10-CM

## 2018-10-23 DIAGNOSIS — I48.91 ATRIAL FIBRILLATION, UNSPECIFIED TYPE (HCC): ICD-10-CM

## 2018-10-23 DIAGNOSIS — Z01.818 PRE-OP EVALUATION: Primary | ICD-10-CM

## 2018-10-23 DIAGNOSIS — Z98.61 HISTORY OF PTCA: ICD-10-CM

## 2018-10-23 PROCEDURE — 3017F COLORECTAL CA SCREEN DOC REV: CPT | Performed by: INTERNAL MEDICINE

## 2018-10-23 PROCEDURE — G8598 ASA/ANTIPLAT THER USED: HCPCS | Performed by: INTERNAL MEDICINE

## 2018-10-23 PROCEDURE — 93000 ELECTROCARDIOGRAM COMPLETE: CPT | Performed by: INTERNAL MEDICINE

## 2018-10-23 PROCEDURE — 1036F TOBACCO NON-USER: CPT | Performed by: INTERNAL MEDICINE

## 2018-10-23 PROCEDURE — 4040F PNEUMOC VAC/ADMIN/RCVD: CPT | Performed by: INTERNAL MEDICINE

## 2018-10-23 PROCEDURE — 99214 OFFICE O/P EST MOD 30 MIN: CPT | Performed by: INTERNAL MEDICINE

## 2018-10-23 PROCEDURE — 1101F PT FALLS ASSESS-DOCD LE1/YR: CPT | Performed by: INTERNAL MEDICINE

## 2018-10-23 PROCEDURE — G8417 CALC BMI ABV UP PARAM F/U: HCPCS | Performed by: INTERNAL MEDICINE

## 2018-10-23 PROCEDURE — 1123F ACP DISCUSS/DSCN MKR DOCD: CPT | Performed by: INTERNAL MEDICINE

## 2018-10-23 PROCEDURE — G8427 DOCREV CUR MEDS BY ELIG CLIN: HCPCS | Performed by: INTERNAL MEDICINE

## 2018-10-23 PROCEDURE — G8484 FLU IMMUNIZE NO ADMIN: HCPCS | Performed by: INTERNAL MEDICINE

## 2018-10-23 ASSESSMENT — ENCOUNTER SYMPTOMS
CHOKING: 0
ABDOMINAL DISTENTION: 0
COUGH: 0
CHEST TIGHTNESS: 0
APNEA: 0
SHORTNESS OF BREATH: 1

## 2018-11-05 NOTE — TELEPHONE ENCOUNTER
Requested Prescriptions     Pending Prescriptions Disp Refills    minoxidil (LONITEN) 10 MG tablet [Pharmacy Med Name: MINOXIDIL 10 MG TAB 10 TAB] 180 tablet 3     Sig: TAKE 1/2 TABLET BY MOUTH 2 TIMES A DAY     Last Fill         Last seen        Next appointment      Last labs     Ordering Physician      2/1/18  10/23/18 1/29/19       10/10//18   Serena Neely

## 2018-11-08 RX ORDER — MINOXIDIL 10 MG/1
TABLET ORAL
Qty: 180 TABLET | Refills: 3 | Status: SHIPPED | OUTPATIENT
Start: 2018-11-08 | End: 2019-12-26

## 2018-11-09 DIAGNOSIS — I48.0 PAROXYSMAL ATRIAL FIBRILLATION (HCC): ICD-10-CM

## 2018-11-09 LAB
INR BLD: 3.54 (ref 0.86–1.14)
PROTHROMBIN TIME: 40.3 SEC (ref 9.8–13)

## 2018-11-12 ENCOUNTER — ANTI-COAG VISIT (OUTPATIENT)
Dept: CARDIOLOGY CLINIC | Age: 73
End: 2018-11-12
Payer: MEDICARE

## 2018-11-12 DIAGNOSIS — I48.0 PAROXYSMAL ATRIAL FIBRILLATION (HCC): ICD-10-CM

## 2018-11-12 PROCEDURE — 93793 ANTICOAG MGMT PT WARFARIN: CPT | Performed by: NURSE PRACTITIONER

## 2018-11-20 DIAGNOSIS — I48.0 PAROXYSMAL ATRIAL FIBRILLATION (HCC): ICD-10-CM

## 2018-11-20 LAB
INR BLD: 2.37 (ref 0.86–1.14)
PROTHROMBIN TIME: 27 SEC (ref 9.8–13)

## 2018-11-21 ENCOUNTER — ANTI-COAG VISIT (OUTPATIENT)
Dept: CARDIOLOGY CLINIC | Age: 73
End: 2018-11-21
Payer: MEDICARE

## 2018-11-21 DIAGNOSIS — I48.0 PAROXYSMAL ATRIAL FIBRILLATION (HCC): ICD-10-CM

## 2018-11-21 PROCEDURE — 93793 ANTICOAG MGMT PT WARFARIN: CPT | Performed by: NURSE PRACTITIONER

## 2018-12-04 DIAGNOSIS — I48.0 PAROXYSMAL ATRIAL FIBRILLATION (HCC): ICD-10-CM

## 2018-12-04 LAB
INR BLD: 2.19 (ref 0.86–1.14)
PROTHROMBIN TIME: 25 SEC (ref 9.8–13)

## 2018-12-05 ENCOUNTER — ANTI-COAG VISIT (OUTPATIENT)
Dept: CARDIOLOGY CLINIC | Age: 73
End: 2018-12-05
Payer: MEDICARE

## 2018-12-05 DIAGNOSIS — I48.0 PAROXYSMAL ATRIAL FIBRILLATION (HCC): ICD-10-CM

## 2018-12-05 PROCEDURE — 93793 ANTICOAG MGMT PT WARFARIN: CPT | Performed by: NURSE PRACTITIONER

## 2018-12-11 ENCOUNTER — NURSE ONLY (OUTPATIENT)
Dept: CARDIOLOGY CLINIC | Age: 73
End: 2018-12-11

## 2018-12-11 DIAGNOSIS — I49.5 SICK SINUS SYNDROME (HCC): ICD-10-CM

## 2018-12-11 DIAGNOSIS — Z95.0 CARDIAC PACEMAKER IN SITU: ICD-10-CM

## 2018-12-12 ENCOUNTER — TELEPHONE (OUTPATIENT)
Dept: CARDIOLOGY CLINIC | Age: 73
End: 2018-12-12

## 2018-12-18 DIAGNOSIS — I48.0 PAROXYSMAL ATRIAL FIBRILLATION (HCC): ICD-10-CM

## 2018-12-18 LAB
INR BLD: 1.9 (ref 0.86–1.14)
PROTHROMBIN TIME: 21.7 SEC (ref 9.8–13)

## 2018-12-19 ENCOUNTER — ANTI-COAG VISIT (OUTPATIENT)
Dept: CARDIOLOGY CLINIC | Age: 73
End: 2018-12-19
Payer: MEDICARE

## 2018-12-19 DIAGNOSIS — I48.0 PAROXYSMAL ATRIAL FIBRILLATION (HCC): ICD-10-CM

## 2018-12-19 PROCEDURE — 93793 ANTICOAG MGMT PT WARFARIN: CPT | Performed by: NURSE PRACTITIONER

## 2018-12-19 RX ORDER — DILTIAZEM HYDROCHLORIDE 120 MG/1
240 CAPSULE, COATED, EXTENDED RELEASE ORAL DAILY
Qty: 180 CAPSULE | Refills: 3 | Status: ON HOLD | OUTPATIENT
Start: 2018-12-19 | End: 2019-03-09 | Stop reason: SDUPTHER

## 2018-12-19 NOTE — PROGRESS NOTES
ANTICOAGULATION MONITORING    Chelita Louis, 1945      Anticoagulation Indication(s):  Afib    Referring Physician:   Dr. Marisela Figueroa  Goal INR Range:  2-3  Duration of Anticoagulation Therapy:  Ongoing   Home or Lab Draw: Lab  Product patient has at home: warfarin 5 mg    Recent INR Results:  Lab Results   Component Value Date    INR 1.90 (H) 12/18/2018    INR 2.19 (H) 12/04/2018    INR 2.37 (H) 11/20/2018    INR 3.54 (H) 11/09/2018       INR Summary                          Warfarin regimen (mg)  Date INR A/P Sun Mon Tue Wed Thu Fri Sat Mg/wk   12/18/18 1.9 Below goal, increase by 2.5 5 5 5 5 7.5 5 7.5 40   12/4/18 2.19 At goal, no change 5 5 5 5 5 5 7.5 37.5   11/20/18 2.37 At goal, no change 5 2.5 5 5 7.5 5 7.5 37.5   11/9/18 3.54 Above goal, decrease by 2.5 mg 5 2.5 5 5 7.5 5 7.5 37.5   10/10/18 2.62 At goal, no change 5 5 5 5 7.5 5 7.5 40   9/14/18 2.02 At goal, no change 5 5 5 5 7.5 5 7.5 40         Assessment/Plan:    Recent hospitalizations/HC visits None reported   Recent medication changes None reported   Medications taken regularly that may interact with warfarin or alter INR No significant drug interactions identified   Warfarin dose taken as prescribed Patient uses pillbox? yes   Signs/symptoms of bleeding History of bleeding? no   Vitamin K intake Normally has ~3 servings of green, leafy vegetables per week   Recent vomiting/diarrhea/fever None reported   Changes in weight, activity, stress None reported   Tobacco or alcohol use Patient reports smoking no PPD  Patient reports having no drinks per day   Upcoming surgeries or procedures None reported     Patient's INR was out of range today, so patient was instructed to modify warfarin dose as above  Patient was also reminded to maintain consistent vitamin K intake and call with any bleeding, medication changes, or fever/vomiting/diarrhea. Next INR check:  12/27/18    Called and went over dosing instructions. Patient verbalized understanding.

## 2018-12-19 NOTE — TELEPHONE ENCOUNTER
Requested Prescriptions     Pending Prescriptions Disp Refills    diltiazem (CARTIA XT) 120 MG extended release capsule [Pharmacy Med Name: CARTIA  MG CAPSULE 120 CAP] 180 capsule 3     Sig: Take 2 capsules by mouth daily           Last ov:10/23/18    Next ov:1/29/19    Last fill:3/13/18    Please sign in Dr Camara Case absence.

## 2018-12-27 DIAGNOSIS — I48.0 PAROXYSMAL ATRIAL FIBRILLATION (HCC): ICD-10-CM

## 2018-12-27 LAB
INR BLD: 2.11 (ref 0.86–1.14)
PROTHROMBIN TIME: 24 SEC (ref 9.8–13)

## 2018-12-28 ENCOUNTER — ANTI-COAG VISIT (OUTPATIENT)
Dept: CARDIOLOGY CLINIC | Age: 73
End: 2018-12-28
Payer: MEDICARE

## 2018-12-28 DIAGNOSIS — I48.0 PAROXYSMAL ATRIAL FIBRILLATION (HCC): ICD-10-CM

## 2018-12-28 PROCEDURE — 93793 ANTICOAG MGMT PT WARFARIN: CPT | Performed by: NURSE PRACTITIONER

## 2018-12-28 NOTE — PROGRESS NOTES
1/3/19    Called and went over dosing instructions. Patient verbalized understanding. Addendum:  12/28/18  Reviewed RN assessment and plan. INR is at goal, increase current dose by 2.5mg per pt request  Repeat INR in 1 week/s. Patient/family instructed by RN.      Grant Bautista, CNP

## 2019-01-03 DIAGNOSIS — I48.0 PAROXYSMAL ATRIAL FIBRILLATION (HCC): ICD-10-CM

## 2019-01-03 LAB
INR BLD: 2.58 (ref 0.86–1.14)
PROTHROMBIN TIME: 29.4 SEC (ref 9.8–13)

## 2019-01-04 ENCOUNTER — ANTI-COAG VISIT (OUTPATIENT)
Dept: CARDIOLOGY CLINIC | Age: 74
End: 2019-01-04
Payer: MEDICARE

## 2019-01-04 DIAGNOSIS — I48.0 PAROXYSMAL ATRIAL FIBRILLATION (HCC): ICD-10-CM

## 2019-01-04 PROCEDURE — 93793 ANTICOAG MGMT PT WARFARIN: CPT | Performed by: NURSE PRACTITIONER

## 2019-01-11 RX ORDER — SOTALOL HYDROCHLORIDE 80 MG/1
40 TABLET ORAL 2 TIMES DAILY
Qty: 90 TABLET | Refills: 3 | Status: SHIPPED | OUTPATIENT
Start: 2019-01-11 | End: 2019-03-28 | Stop reason: DRUGHIGH

## 2019-01-16 ENCOUNTER — ANTI-COAG VISIT (OUTPATIENT)
Dept: CARDIOLOGY CLINIC | Age: 74
End: 2019-01-16
Payer: MEDICARE

## 2019-01-16 DIAGNOSIS — I48.0 PAROXYSMAL ATRIAL FIBRILLATION (HCC): ICD-10-CM

## 2019-01-16 LAB
INR BLD: 2.65 (ref 0.86–1.14)
PROTHROMBIN TIME: 30.2 SEC (ref 9.8–13)

## 2019-01-16 PROCEDURE — 93793 ANTICOAG MGMT PT WARFARIN: CPT | Performed by: NURSE PRACTITIONER

## 2019-01-16 RX ORDER — LOSARTAN POTASSIUM 100 MG/1
100 TABLET ORAL 2 TIMES DAILY
Qty: 180 TABLET | Refills: 3 | Status: ON HOLD | OUTPATIENT
Start: 2019-01-16 | End: 2019-03-09 | Stop reason: HOSPADM

## 2019-01-29 ENCOUNTER — OFFICE VISIT (OUTPATIENT)
Dept: CARDIOLOGY CLINIC | Age: 74
End: 2019-01-29
Payer: MEDICARE

## 2019-01-29 VITALS
SYSTOLIC BLOOD PRESSURE: 140 MMHG | DIASTOLIC BLOOD PRESSURE: 70 MMHG | HEART RATE: 74 BPM | WEIGHT: 205 LBS | BODY MASS INDEX: 31.17 KG/M2

## 2019-01-29 DIAGNOSIS — I49.5 SSS (SICK SINUS SYNDROME) (HCC): ICD-10-CM

## 2019-01-29 DIAGNOSIS — Z95.0 PACEMAKER: ICD-10-CM

## 2019-01-29 DIAGNOSIS — Z98.61 POST PTCA: ICD-10-CM

## 2019-01-29 DIAGNOSIS — I50.32 CHRONIC DIASTOLIC CONGESTIVE HEART FAILURE (HCC): ICD-10-CM

## 2019-01-29 DIAGNOSIS — I48.0 PAROXYSMAL ATRIAL FIBRILLATION (HCC): Primary | ICD-10-CM

## 2019-01-29 DIAGNOSIS — I35.0 NONRHEUMATIC AORTIC VALVE STENOSIS: ICD-10-CM

## 2019-01-29 DIAGNOSIS — I25.10 CAD IN NATIVE ARTERY: ICD-10-CM

## 2019-01-29 PROCEDURE — 1036F TOBACCO NON-USER: CPT | Performed by: INTERNAL MEDICINE

## 2019-01-29 PROCEDURE — G8598 ASA/ANTIPLAT THER USED: HCPCS | Performed by: INTERNAL MEDICINE

## 2019-01-29 PROCEDURE — G8417 CALC BMI ABV UP PARAM F/U: HCPCS | Performed by: INTERNAL MEDICINE

## 2019-01-29 PROCEDURE — 99214 OFFICE O/P EST MOD 30 MIN: CPT | Performed by: INTERNAL MEDICINE

## 2019-01-29 PROCEDURE — 1101F PT FALLS ASSESS-DOCD LE1/YR: CPT | Performed by: INTERNAL MEDICINE

## 2019-01-29 PROCEDURE — G8484 FLU IMMUNIZE NO ADMIN: HCPCS | Performed by: INTERNAL MEDICINE

## 2019-01-29 PROCEDURE — 4040F PNEUMOC VAC/ADMIN/RCVD: CPT | Performed by: INTERNAL MEDICINE

## 2019-01-29 PROCEDURE — 3017F COLORECTAL CA SCREEN DOC REV: CPT | Performed by: INTERNAL MEDICINE

## 2019-01-29 PROCEDURE — G8427 DOCREV CUR MEDS BY ELIG CLIN: HCPCS | Performed by: INTERNAL MEDICINE

## 2019-01-29 PROCEDURE — 1123F ACP DISCUSS/DSCN MKR DOCD: CPT | Performed by: INTERNAL MEDICINE

## 2019-01-29 ASSESSMENT — ENCOUNTER SYMPTOMS
COUGH: 0
ABDOMINAL DISTENTION: 0
CHOKING: 0
APNEA: 0
CHEST TIGHTNESS: 0
SHORTNESS OF BREATH: 1

## 2019-02-12 ENCOUNTER — HOSPITAL ENCOUNTER (OUTPATIENT)
Dept: NON INVASIVE DIAGNOSTICS | Age: 74
Discharge: HOME | End: 2019-02-12
Payer: MEDICARE

## 2019-02-12 LAB
LV EF: 58 %
LVEF MODALITY: NORMAL

## 2019-02-12 PROCEDURE — 93306 TTE W/DOPPLER COMPLETE: CPT

## 2019-02-15 DIAGNOSIS — I48.0 PAROXYSMAL ATRIAL FIBRILLATION (HCC): ICD-10-CM

## 2019-02-15 LAB
INR BLD: 2.46 (ref 0.86–1.14)
PROTHROMBIN TIME: 28.1 SEC (ref 9.8–13)

## 2019-02-18 ENCOUNTER — ANTI-COAG VISIT (OUTPATIENT)
Dept: CARDIOLOGY CLINIC | Age: 74
End: 2019-02-18
Payer: MEDICARE

## 2019-02-18 DIAGNOSIS — I48.0 PAROXYSMAL ATRIAL FIBRILLATION (HCC): ICD-10-CM

## 2019-02-18 PROCEDURE — 93793 ANTICOAG MGMT PT WARFARIN: CPT | Performed by: NURSE PRACTITIONER

## 2019-02-25 RX ORDER — WARFARIN SODIUM 7.5 MG/1
TABLET ORAL
Qty: 135 TABLET | Refills: 3 | Status: SHIPPED | OUTPATIENT
Start: 2019-02-25 | End: 2019-09-18 | Stop reason: SDUPTHER

## 2019-02-25 RX ORDER — WARFARIN SODIUM 7.5 MG/1
TABLET ORAL
Qty: 48 TABLET | Refills: 3 | Status: SHIPPED | OUTPATIENT
Start: 2019-02-25 | End: 2020-11-18

## 2019-03-06 ENCOUNTER — APPOINTMENT (OUTPATIENT)
Dept: GENERAL RADIOLOGY | Age: 74
DRG: 193 | End: 2019-03-06
Payer: MEDICARE

## 2019-03-06 ENCOUNTER — HOSPITAL ENCOUNTER (INPATIENT)
Age: 74
LOS: 2 days | Discharge: HOME | DRG: 193 | End: 2019-03-09
Attending: EMERGENCY MEDICINE | Admitting: INTERNAL MEDICINE
Payer: MEDICARE

## 2019-03-06 DIAGNOSIS — R79.1 SUPRATHERAPEUTIC INR: ICD-10-CM

## 2019-03-06 DIAGNOSIS — R09.02 HYPOXIA: ICD-10-CM

## 2019-03-06 DIAGNOSIS — J81.0 ACUTE PULMONARY EDEMA (HCC): Primary | ICD-10-CM

## 2019-03-06 PROCEDURE — 85025 COMPLETE CBC W/AUTO DIFF WBC: CPT

## 2019-03-06 PROCEDURE — 82803 BLOOD GASES ANY COMBINATION: CPT

## 2019-03-06 PROCEDURE — 83605 ASSAY OF LACTIC ACID: CPT

## 2019-03-06 PROCEDURE — 83735 ASSAY OF MAGNESIUM: CPT

## 2019-03-06 PROCEDURE — 87804 INFLUENZA ASSAY W/OPTIC: CPT

## 2019-03-06 PROCEDURE — 83880 ASSAY OF NATRIURETIC PEPTIDE: CPT

## 2019-03-06 PROCEDURE — 85610 PROTHROMBIN TIME: CPT

## 2019-03-06 PROCEDURE — 36415 COLL VENOUS BLD VENIPUNCTURE: CPT

## 2019-03-06 PROCEDURE — 84484 ASSAY OF TROPONIN QUANT: CPT

## 2019-03-06 PROCEDURE — 71046 X-RAY EXAM CHEST 2 VIEWS: CPT

## 2019-03-06 PROCEDURE — 84100 ASSAY OF PHOSPHORUS: CPT

## 2019-03-06 PROCEDURE — 99285 EMERGENCY DEPT VISIT HI MDM: CPT

## 2019-03-06 PROCEDURE — 80048 BASIC METABOLIC PNL TOTAL CA: CPT

## 2019-03-07 PROBLEM — J18.9 COMMUNITY ACQUIRED PNEUMONIA: Status: ACTIVE | Noted: 2019-03-07

## 2019-03-07 PROBLEM — I50.9 ACUTE CONGESTIVE HEART FAILURE (HCC): Status: ACTIVE | Noted: 2019-03-07

## 2019-03-07 LAB
ANION GAP SERPL CALCULATED.3IONS-SCNC: 14 MMOL/L (ref 3–16)
ANION GAP SERPL CALCULATED.3IONS-SCNC: 16 MMOL/L (ref 3–16)
BASE EXCESS VENOUS: -5 (ref -3–3)
BASOPHILS ABSOLUTE: 0 K/UL (ref 0–0.2)
BASOPHILS ABSOLUTE: 0.3 K/UL (ref 0–0.2)
BASOPHILS RELATIVE PERCENT: 0 %
BASOPHILS RELATIVE PERCENT: 1 %
BUN BLDV-MCNC: 31 MG/DL (ref 7–20)
BUN BLDV-MCNC: 32 MG/DL (ref 7–20)
CALCIUM SERPL-MCNC: 8.6 MG/DL (ref 8.3–10.6)
CALCIUM SERPL-MCNC: 8.7 MG/DL (ref 8.3–10.6)
CHLORIDE BLD-SCNC: 100 MMOL/L (ref 99–110)
CHLORIDE BLD-SCNC: 99 MMOL/L (ref 99–110)
CO2: 21 MMOL/L (ref 21–32)
CO2: 25 MMOL/L (ref 21–32)
CREAT SERPL-MCNC: 1.1 MG/DL (ref 0.8–1.3)
CREAT SERPL-MCNC: 1.1 MG/DL (ref 0.8–1.3)
EKG ATRIAL RATE: 111 BPM
EKG DIAGNOSIS: NORMAL
EKG Q-T INTERVAL: 172 MS
EKG QRS DURATION: 82 MS
EKG QTC CALCULATION (BAZETT): 229 MS
EKG R AXIS: 75 DEGREES
EKG T AXIS: 224 DEGREES
EKG VENTRICULAR RATE: 107 BPM
EOSINOPHILS ABSOLUTE: 0 K/UL (ref 0–0.6)
EOSINOPHILS ABSOLUTE: 0 K/UL (ref 0–0.6)
EOSINOPHILS RELATIVE PERCENT: 0 %
EOSINOPHILS RELATIVE PERCENT: 0 %
GFR AFRICAN AMERICAN: >60
GFR AFRICAN AMERICAN: >60
GFR NON-AFRICAN AMERICAN: >60
GFR NON-AFRICAN AMERICAN: >60
GLUCOSE BLD-MCNC: 177 MG/DL (ref 70–99)
GLUCOSE BLD-MCNC: 191 MG/DL (ref 70–99)
GLUCOSE BLD-MCNC: 197 MG/DL (ref 70–99)
GLUCOSE BLD-MCNC: 210 MG/DL (ref 70–99)
GLUCOSE BLD-MCNC: 217 MG/DL (ref 70–99)
GLUCOSE BLD-MCNC: 256 MG/DL (ref 70–99)
GLUCOSE BLD-MCNC: 274 MG/DL (ref 70–99)
HCO3 VENOUS: 21.2 MMOL/L (ref 23–29)
HCT VFR BLD CALC: 35.8 % (ref 40.5–52.5)
HCT VFR BLD CALC: 36 % (ref 40.5–52.5)
HEMOGLOBIN: 11.9 G/DL (ref 13.5–17.5)
HEMOGLOBIN: 12 G/DL (ref 13.5–17.5)
INR BLD: 8.48 (ref 0.86–1.14)
INR BLD: 8.54 (ref 0.86–1.14)
LACTATE: 1.76 MMOL/L (ref 0.4–2)
LYMPHOCYTES ABSOLUTE: 0.3 K/UL (ref 1–5.1)
LYMPHOCYTES ABSOLUTE: 0.5 K/UL (ref 1–5.1)
LYMPHOCYTES RELATIVE PERCENT: 1 %
LYMPHOCYTES RELATIVE PERCENT: 1.7 %
MAGNESIUM: 1.7 MG/DL (ref 1.8–2.4)
MAGNESIUM: 1.7 MG/DL (ref 1.8–2.4)
MCH RBC QN AUTO: 30.6 PG (ref 26–34)
MCH RBC QN AUTO: 30.8 PG (ref 26–34)
MCHC RBC AUTO-ENTMCNC: 33.2 G/DL (ref 31–36)
MCHC RBC AUTO-ENTMCNC: 33.2 G/DL (ref 31–36)
MCV RBC AUTO: 92.3 FL (ref 80–100)
MCV RBC AUTO: 92.5 FL (ref 80–100)
MONOCYTES ABSOLUTE: 1.3 K/UL (ref 0–1.3)
MONOCYTES ABSOLUTE: 1.3 K/UL (ref 0–1.3)
MONOCYTES RELATIVE PERCENT: 4 %
MONOCYTES RELATIVE PERCENT: 4.6 %
NEUTROPHILS ABSOLUTE: 26.8 K/UL (ref 1.7–7.7)
NEUTROPHILS ABSOLUTE: 31.2 K/UL (ref 1.7–7.7)
NEUTROPHILS RELATIVE PERCENT: 92.7 %
NEUTROPHILS RELATIVE PERCENT: 95 %
O2 SAT, VEN: 77 %
PCO2, VEN: 40.8 MM HG (ref 40–50)
PDW BLD-RTO: 14.8 % (ref 12.4–15.4)
PDW BLD-RTO: 15.1 % (ref 12.4–15.4)
PERFORMED ON: ABNORMAL
PH VENOUS: 7.32 (ref 7.35–7.45)
PHOSPHORUS: 3.7 MG/DL (ref 2.5–4.9)
PLATELET # BLD: 417 K/UL (ref 135–450)
PLATELET # BLD: 424 K/UL (ref 135–450)
PMV BLD AUTO: 8.4 FL (ref 5–10.5)
PMV BLD AUTO: 8.5 FL (ref 5–10.5)
PO2, VEN: 45 MM HG
POC SAMPLE TYPE: ABNORMAL
POTASSIUM SERPL-SCNC: 4.1 MMOL/L (ref 3.5–5.1)
POTASSIUM SERPL-SCNC: 4.2 MMOL/L (ref 3.5–5.1)
PRO-BNP: 2443 PG/ML (ref 0–449)
PROTHROMBIN TIME: 96.7 SEC (ref 9.8–13)
PROTHROMBIN TIME: 97.4 SEC (ref 9.8–13)
RAPID INFLUENZA  B AGN: NEGATIVE
RAPID INFLUENZA A AGN: NEGATIVE
RBC # BLD: 3.88 M/UL (ref 4.2–5.9)
RBC # BLD: 3.89 M/UL (ref 4.2–5.9)
SODIUM BLD-SCNC: 137 MMOL/L (ref 136–145)
SODIUM BLD-SCNC: 138 MMOL/L (ref 136–145)
TCO2 CALC VENOUS: 22 MMOL/L
TROPONIN: <0.01 NG/ML
WBC # BLD: 28.9 K/UL (ref 4–11)
WBC # BLD: 32.8 K/UL (ref 4–11)

## 2019-03-07 PROCEDURE — 94761 N-INVAS EAR/PLS OXIMETRY MLT: CPT

## 2019-03-07 PROCEDURE — 85610 PROTHROMBIN TIME: CPT

## 2019-03-07 PROCEDURE — 96374 THER/PROPH/DIAG INJ IV PUSH: CPT

## 2019-03-07 PROCEDURE — 80048 BASIC METABOLIC PNL TOTAL CA: CPT

## 2019-03-07 PROCEDURE — 36415 COLL VENOUS BLD VENIPUNCTURE: CPT

## 2019-03-07 PROCEDURE — 2700000000 HC OXYGEN THERAPY PER DAY

## 2019-03-07 PROCEDURE — 2580000003 HC RX 258: Performed by: INTERNAL MEDICINE

## 2019-03-07 PROCEDURE — 6370000000 HC RX 637 (ALT 250 FOR IP): Performed by: INTERNAL MEDICINE

## 2019-03-07 PROCEDURE — 6360000002 HC RX W HCPCS: Performed by: EMERGENCY MEDICINE

## 2019-03-07 PROCEDURE — 1200000000 HC SEMI PRIVATE

## 2019-03-07 PROCEDURE — 83735 ASSAY OF MAGNESIUM: CPT

## 2019-03-07 PROCEDURE — 94640 AIRWAY INHALATION TREATMENT: CPT

## 2019-03-07 PROCEDURE — 93308 TTE F-UP OR LMTD: CPT

## 2019-03-07 PROCEDURE — 85025 COMPLETE CBC W/AUTO DIFF WBC: CPT

## 2019-03-07 PROCEDURE — 99223 1ST HOSP IP/OBS HIGH 75: CPT | Performed by: INTERNAL MEDICINE

## 2019-03-07 PROCEDURE — 6360000002 HC RX W HCPCS: Performed by: INTERNAL MEDICINE

## 2019-03-07 PROCEDURE — 93005 ELECTROCARDIOGRAM TRACING: CPT | Performed by: EMERGENCY MEDICINE

## 2019-03-07 RX ORDER — MINOXIDIL 2.5 MG/1
5 TABLET ORAL 2 TIMES DAILY
Status: DISCONTINUED | OUTPATIENT
Start: 2019-03-07 | End: 2019-03-09 | Stop reason: HOSPADM

## 2019-03-07 RX ORDER — DEXTROSE MONOHYDRATE 25 G/50ML
12.5 INJECTION, SOLUTION INTRAVENOUS PRN
Status: DISCONTINUED | OUTPATIENT
Start: 2019-03-07 | End: 2019-03-09 | Stop reason: HOSPADM

## 2019-03-07 RX ORDER — SOTALOL HYDROCHLORIDE 80 MG/1
80 TABLET ORAL 2 TIMES DAILY
Status: DISCONTINUED | OUTPATIENT
Start: 2019-03-07 | End: 2019-03-09 | Stop reason: HOSPADM

## 2019-03-07 RX ORDER — TAMSULOSIN HYDROCHLORIDE 0.4 MG/1
0.4 CAPSULE ORAL NIGHTLY
Status: DISCONTINUED | OUTPATIENT
Start: 2019-03-07 | End: 2019-03-09 | Stop reason: HOSPADM

## 2019-03-07 RX ORDER — NICOTINE POLACRILEX 4 MG
15 LOZENGE BUCCAL PRN
Status: DISCONTINUED | OUTPATIENT
Start: 2019-03-07 | End: 2019-03-09 | Stop reason: HOSPADM

## 2019-03-07 RX ORDER — GUAIFENESIN/DEXTROMETHORPHAN 100-10MG/5
5 SYRUP ORAL EVERY 4 HOURS PRN
Status: DISCONTINUED | OUTPATIENT
Start: 2019-03-07 | End: 2019-03-09 | Stop reason: HOSPADM

## 2019-03-07 RX ORDER — DEXTROSE MONOHYDRATE 50 MG/ML
100 INJECTION, SOLUTION INTRAVENOUS PRN
Status: DISCONTINUED | OUTPATIENT
Start: 2019-03-07 | End: 2019-03-09 | Stop reason: HOSPADM

## 2019-03-07 RX ORDER — ONDANSETRON 2 MG/ML
4 INJECTION INTRAMUSCULAR; INTRAVENOUS EVERY 6 HOURS PRN
Status: DISCONTINUED | OUTPATIENT
Start: 2019-03-07 | End: 2019-03-07

## 2019-03-07 RX ORDER — ATORVASTATIN CALCIUM 40 MG/1
40 TABLET, FILM COATED ORAL NIGHTLY
Status: DISCONTINUED | OUTPATIENT
Start: 2019-03-07 | End: 2019-03-09 | Stop reason: HOSPADM

## 2019-03-07 RX ORDER — IPRATROPIUM BROMIDE AND ALBUTEROL SULFATE 2.5; .5 MG/3ML; MG/3ML
1 SOLUTION RESPIRATORY (INHALATION)
Status: DISCONTINUED | OUTPATIENT
Start: 2019-03-07 | End: 2019-03-07

## 2019-03-07 RX ORDER — FUROSEMIDE 10 MG/ML
40 INJECTION INTRAMUSCULAR; INTRAVENOUS ONCE
Status: COMPLETED | OUTPATIENT
Start: 2019-03-07 | End: 2019-03-07

## 2019-03-07 RX ORDER — LEVOTHYROXINE SODIUM 0.15 MG/1
150 TABLET ORAL
Status: DISCONTINUED | OUTPATIENT
Start: 2019-03-07 | End: 2019-03-09 | Stop reason: HOSPADM

## 2019-03-07 RX ORDER — SOTALOL HYDROCHLORIDE 80 MG/1
40 TABLET ORAL 2 TIMES DAILY
Status: DISCONTINUED | OUTPATIENT
Start: 2019-03-07 | End: 2019-03-07

## 2019-03-07 RX ORDER — ACETAMINOPHEN 500 MG
1000 TABLET ORAL EVERY 6 HOURS PRN
Status: DISCONTINUED | OUTPATIENT
Start: 2019-03-07 | End: 2019-03-09 | Stop reason: HOSPADM

## 2019-03-07 RX ORDER — FUROSEMIDE 10 MG/ML
40 INJECTION INTRAMUSCULAR; INTRAVENOUS 3 TIMES DAILY
Status: DISCONTINUED | OUTPATIENT
Start: 2019-03-07 | End: 2019-03-07

## 2019-03-07 RX ORDER — DILTIAZEM HYDROCHLORIDE 240 MG/1
240 CAPSULE, COATED, EXTENDED RELEASE ORAL DAILY
Status: DISCONTINUED | OUTPATIENT
Start: 2019-03-07 | End: 2019-03-07

## 2019-03-07 RX ORDER — FAMOTIDINE 20 MG/1
20 TABLET, FILM COATED ORAL DAILY
Status: DISCONTINUED | OUTPATIENT
Start: 2019-03-07 | End: 2019-03-09 | Stop reason: HOSPADM

## 2019-03-07 RX ORDER — SODIUM CHLORIDE 0.9 % (FLUSH) 0.9 %
10 SYRINGE (ML) INJECTION EVERY 12 HOURS SCHEDULED
Status: DISCONTINUED | OUTPATIENT
Start: 2019-03-07 | End: 2019-03-09 | Stop reason: HOSPADM

## 2019-03-07 RX ORDER — TRAZODONE HYDROCHLORIDE 100 MG/1
100 TABLET ORAL NIGHTLY
Status: DISCONTINUED | OUTPATIENT
Start: 2019-03-07 | End: 2019-03-09 | Stop reason: HOSPADM

## 2019-03-07 RX ORDER — ALLOPURINOL 300 MG/1
300 TABLET ORAL NIGHTLY
Status: DISCONTINUED | OUTPATIENT
Start: 2019-03-07 | End: 2019-03-09 | Stop reason: HOSPADM

## 2019-03-07 RX ORDER — ASPIRIN 81 MG/1
81 TABLET, CHEWABLE ORAL DAILY
Status: DISCONTINUED | OUTPATIENT
Start: 2019-03-07 | End: 2019-03-09 | Stop reason: HOSPADM

## 2019-03-07 RX ORDER — DILTIAZEM HYDROCHLORIDE 120 MG/1
120 CAPSULE, COATED, EXTENDED RELEASE ORAL DAILY
Status: DISCONTINUED | OUTPATIENT
Start: 2019-03-07 | End: 2019-03-09 | Stop reason: HOSPADM

## 2019-03-07 RX ORDER — OXYCODONE HYDROCHLORIDE 5 MG/1
5 TABLET ORAL EVERY 4 HOURS PRN
Status: DISCONTINUED | OUTPATIENT
Start: 2019-03-07 | End: 2019-03-09 | Stop reason: HOSPADM

## 2019-03-07 RX ORDER — IPRATROPIUM BROMIDE AND ALBUTEROL SULFATE 2.5; .5 MG/3ML; MG/3ML
1 SOLUTION RESPIRATORY (INHALATION) EVERY 4 HOURS PRN
Status: DISCONTINUED | OUTPATIENT
Start: 2019-03-07 | End: 2019-03-09 | Stop reason: HOSPADM

## 2019-03-07 RX ORDER — FUROSEMIDE 10 MG/ML
40 INJECTION INTRAMUSCULAR; INTRAVENOUS 2 TIMES DAILY
Status: DISCONTINUED | OUTPATIENT
Start: 2019-03-07 | End: 2019-03-08

## 2019-03-07 RX ADMIN — LEVOTHYROXINE SODIUM 150 MCG: 150 TABLET ORAL at 06:53

## 2019-03-07 RX ADMIN — SOTALOL HYDROCHLORIDE 80 MG: 80 TABLET ORAL at 23:09

## 2019-03-07 RX ADMIN — ALLOPURINOL 300 MG: 300 TABLET ORAL at 21:14

## 2019-03-07 RX ADMIN — ACETAMINOPHEN 1000 MG: 500 TABLET, FILM COATED ORAL at 09:41

## 2019-03-07 RX ADMIN — FUROSEMIDE 40 MG: 10 INJECTION, SOLUTION INTRAMUSCULAR; INTRAVENOUS at 18:28

## 2019-03-07 RX ADMIN — ACETAMINOPHEN 1000 MG: 500 TABLET, FILM COATED ORAL at 18:36

## 2019-03-07 RX ADMIN — AZITHROMYCIN MONOHYDRATE 500 MG: 500 INJECTION, POWDER, LYOPHILIZED, FOR SOLUTION INTRAVENOUS at 10:05

## 2019-03-07 RX ADMIN — GUAIFENESIN AND DEXTROMETHORPHAN 5 ML: 100; 10 SYRUP ORAL at 08:38

## 2019-03-07 RX ADMIN — INSULIN GLARGINE 20 UNITS: 100 INJECTION, SOLUTION SUBCUTANEOUS at 23:09

## 2019-03-07 RX ADMIN — TAMSULOSIN HYDROCHLORIDE 0.4 MG: 0.4 CAPSULE ORAL at 21:14

## 2019-03-07 RX ADMIN — ATORVASTATIN CALCIUM 40 MG: 40 TABLET, FILM COATED ORAL at 21:14

## 2019-03-07 RX ADMIN — ENOXAPARIN SODIUM 40 MG: 40 INJECTION SUBCUTANEOUS at 08:20

## 2019-03-07 RX ADMIN — FUROSEMIDE 40 MG: 10 INJECTION, SOLUTION INTRAMUSCULAR; INTRAVENOUS at 08:20

## 2019-03-07 RX ADMIN — IPRATROPIUM BROMIDE AND ALBUTEROL SULFATE 1 AMPULE: .5; 3 SOLUTION RESPIRATORY (INHALATION) at 15:06

## 2019-03-07 RX ADMIN — ASPIRIN 81 MG 81 MG: 81 TABLET ORAL at 08:20

## 2019-03-07 RX ADMIN — CEFTRIAXONE 1 G: 1 INJECTION, POWDER, FOR SOLUTION INTRAMUSCULAR; INTRAVENOUS at 09:33

## 2019-03-07 RX ADMIN — Medication 10 ML: at 08:20

## 2019-03-07 RX ADMIN — DILTIAZEM HYDROCHLORIDE 120 MG: 120 CAPSULE, COATED, EXTENDED RELEASE ORAL at 08:20

## 2019-03-07 RX ADMIN — FUROSEMIDE 40 MG: 10 INJECTION, SOLUTION INTRAMUSCULAR; INTRAVENOUS at 01:04

## 2019-03-07 RX ADMIN — GUAIFENESIN AND DEXTROMETHORPHAN 5 ML: 100; 10 SYRUP ORAL at 14:56

## 2019-03-07 RX ADMIN — INSULIN LISPRO 3 UNITS: 100 INJECTION, SOLUTION INTRAVENOUS; SUBCUTANEOUS at 23:09

## 2019-03-07 RX ADMIN — OXYCODONE HYDROCHLORIDE 5 MG: 5 TABLET ORAL at 13:09

## 2019-03-07 RX ADMIN — MINOXIDIL 5 MG: 2.5 TABLET ORAL at 08:20

## 2019-03-07 RX ADMIN — TRAZODONE HYDROCHLORIDE 100 MG: 100 TABLET ORAL at 23:01

## 2019-03-07 RX ADMIN — INSULIN LISPRO 2 UNITS: 100 INJECTION, SOLUTION INTRAVENOUS; SUBCUTANEOUS at 10:03

## 2019-03-07 RX ADMIN — INSULIN LISPRO 4 UNITS: 100 INJECTION, SOLUTION INTRAVENOUS; SUBCUTANEOUS at 18:37

## 2019-03-07 RX ADMIN — Medication 10 ML: at 21:16

## 2019-03-07 RX ADMIN — SOTALOL HYDROCHLORIDE 40 MG: 80 TABLET ORAL at 08:20

## 2019-03-07 RX ADMIN — INSULIN LISPRO 4 UNITS: 100 INJECTION, SOLUTION INTRAVENOUS; SUBCUTANEOUS at 13:06

## 2019-03-07 RX ADMIN — FAMOTIDINE 20 MG: 20 TABLET ORAL at 08:20

## 2019-03-07 ASSESSMENT — PAIN SCALES - GENERAL
PAINLEVEL_OUTOF10: 2
PAINLEVEL_OUTOF10: 0
PAINLEVEL_OUTOF10: 4
PAINLEVEL_OUTOF10: 4
PAINLEVEL_OUTOF10: 0
PAINLEVEL_OUTOF10: 4
PAINLEVEL_OUTOF10: 0
PAINLEVEL_OUTOF10: 2
PAINLEVEL_OUTOF10: 0

## 2019-03-07 ASSESSMENT — PAIN DESCRIPTION - ORIENTATION: ORIENTATION: LEFT;UPPER

## 2019-03-07 ASSESSMENT — PAIN DESCRIPTION - DESCRIPTORS
DESCRIPTORS: ACHING
DESCRIPTORS: ACHING

## 2019-03-07 ASSESSMENT — PAIN - FUNCTIONAL ASSESSMENT: PAIN_FUNCTIONAL_ASSESSMENT: ACTIVITIES ARE NOT PREVENTED

## 2019-03-07 ASSESSMENT — PAIN DESCRIPTION - PAIN TYPE
TYPE: ACUTE PAIN

## 2019-03-07 ASSESSMENT — PAIN DESCRIPTION - ONSET: ONSET: ON-GOING

## 2019-03-07 ASSESSMENT — ENCOUNTER SYMPTOMS
GASTROINTESTINAL NEGATIVE: 1
EYES NEGATIVE: 1
SHORTNESS OF BREATH: 1
COUGH: 1

## 2019-03-07 ASSESSMENT — PAIN DESCRIPTION - LOCATION
LOCATION: BACK
LOCATION: BACK
LOCATION: BACK;CHEST

## 2019-03-07 ASSESSMENT — PAIN DESCRIPTION - FREQUENCY: FREQUENCY: CONTINUOUS

## 2019-03-07 ASSESSMENT — PAIN DESCRIPTION - PROGRESSION: CLINICAL_PROGRESSION: NOT CHANGED

## 2019-03-08 LAB
ANION GAP SERPL CALCULATED.3IONS-SCNC: 16 MMOL/L (ref 3–16)
BASOPHILS ABSOLUTE: 0 K/UL (ref 0–0.2)
BASOPHILS RELATIVE PERCENT: 0 %
BUN BLDV-MCNC: 39 MG/DL (ref 7–20)
CALCIUM SERPL-MCNC: 8.1 MG/DL (ref 8.3–10.6)
CHLORIDE BLD-SCNC: 100 MMOL/L (ref 99–110)
CO2: 26 MMOL/L (ref 21–32)
CREAT SERPL-MCNC: 1.4 MG/DL (ref 0.8–1.3)
EOSINOPHILS ABSOLUTE: 0 K/UL (ref 0–0.6)
EOSINOPHILS RELATIVE PERCENT: 0.1 %
GFR AFRICAN AMERICAN: 60
GFR NON-AFRICAN AMERICAN: 49
GLUCOSE BLD-MCNC: 104 MG/DL (ref 70–99)
GLUCOSE BLD-MCNC: 114 MG/DL (ref 70–99)
GLUCOSE BLD-MCNC: 136 MG/DL (ref 70–99)
GLUCOSE BLD-MCNC: 146 MG/DL (ref 70–99)
GLUCOSE BLD-MCNC: 219 MG/DL (ref 70–99)
HCT VFR BLD CALC: 30.7 % (ref 40.5–52.5)
HEMOGLOBIN: 10.5 G/DL (ref 13.5–17.5)
INR BLD: 3.74 (ref 0.86–1.14)
LYMPHOCYTES ABSOLUTE: 0.6 K/UL (ref 1–5.1)
LYMPHOCYTES RELATIVE PERCENT: 2.4 %
MAGNESIUM: 1.7 MG/DL (ref 1.8–2.4)
MCH RBC QN AUTO: 30.6 PG (ref 26–34)
MCHC RBC AUTO-ENTMCNC: 34.1 G/DL (ref 31–36)
MCV RBC AUTO: 89.6 FL (ref 80–100)
MONOCYTES ABSOLUTE: 1.1 K/UL (ref 0–1.3)
MONOCYTES RELATIVE PERCENT: 4.4 %
NEUTROPHILS ABSOLUTE: 22.9 K/UL (ref 1.7–7.7)
NEUTROPHILS RELATIVE PERCENT: 93.1 %
PDW BLD-RTO: 14.7 % (ref 12.4–15.4)
PERFORMED ON: ABNORMAL
PLATELET # BLD: 282 K/UL (ref 135–450)
PMV BLD AUTO: 8.5 FL (ref 5–10.5)
POTASSIUM SERPL-SCNC: 3.2 MMOL/L (ref 3.5–5.1)
PROTHROMBIN TIME: 42.6 SEC (ref 9.8–13)
RBC # BLD: 3.43 M/UL (ref 4.2–5.9)
SODIUM BLD-SCNC: 142 MMOL/L (ref 136–145)
WBC # BLD: 24.6 K/UL (ref 4–11)

## 2019-03-08 PROCEDURE — 36415 COLL VENOUS BLD VENIPUNCTURE: CPT

## 2019-03-08 PROCEDURE — 99233 SBSQ HOSP IP/OBS HIGH 50: CPT | Performed by: INTERNAL MEDICINE

## 2019-03-08 PROCEDURE — 90670 PCV13 VACCINE IM: CPT | Performed by: INTERNAL MEDICINE

## 2019-03-08 PROCEDURE — 80048 BASIC METABOLIC PNL TOTAL CA: CPT

## 2019-03-08 PROCEDURE — 6370000000 HC RX 637 (ALT 250 FOR IP): Performed by: INTERNAL MEDICINE

## 2019-03-08 PROCEDURE — 85610 PROTHROMBIN TIME: CPT

## 2019-03-08 PROCEDURE — G0009 ADMIN PNEUMOCOCCAL VACCINE: HCPCS | Performed by: INTERNAL MEDICINE

## 2019-03-08 PROCEDURE — 83735 ASSAY OF MAGNESIUM: CPT

## 2019-03-08 PROCEDURE — 85025 COMPLETE CBC W/AUTO DIFF WBC: CPT

## 2019-03-08 PROCEDURE — 2580000003 HC RX 258: Performed by: INTERNAL MEDICINE

## 2019-03-08 PROCEDURE — 1200000000 HC SEMI PRIVATE

## 2019-03-08 PROCEDURE — 6360000002 HC RX W HCPCS: Performed by: INTERNAL MEDICINE

## 2019-03-08 RX ORDER — GLIMEPIRIDE 4 MG/1
4 TABLET ORAL
Status: DISCONTINUED | OUTPATIENT
Start: 2019-03-08 | End: 2019-03-09 | Stop reason: HOSPADM

## 2019-03-08 RX ORDER — WARFARIN SODIUM 5 MG/1
5 TABLET ORAL
Status: COMPLETED | OUTPATIENT
Start: 2019-03-08 | End: 2019-03-08

## 2019-03-08 RX ORDER — POTASSIUM CHLORIDE 20 MEQ/1
40 TABLET, EXTENDED RELEASE ORAL 2 TIMES DAILY WITH MEALS
Status: DISCONTINUED | OUTPATIENT
Start: 2019-03-08 | End: 2019-03-09 | Stop reason: HOSPADM

## 2019-03-08 RX ORDER — FUROSEMIDE 20 MG/1
20 TABLET ORAL DAILY
Status: DISCONTINUED | OUTPATIENT
Start: 2019-03-08 | End: 2019-03-09 | Stop reason: HOSPADM

## 2019-03-08 RX ADMIN — Medication 10 ML: at 09:14

## 2019-03-08 RX ADMIN — ASPIRIN 81 MG 81 MG: 81 TABLET ORAL at 09:00

## 2019-03-08 RX ADMIN — LEVOTHYROXINE SODIUM 150 MCG: 150 TABLET ORAL at 06:49

## 2019-03-08 RX ADMIN — Medication 10 ML: at 21:23

## 2019-03-08 RX ADMIN — GLIMEPIRIDE 4 MG: 4 TABLET ORAL at 09:00

## 2019-03-08 RX ADMIN — SOTALOL HYDROCHLORIDE 80 MG: 80 TABLET ORAL at 08:59

## 2019-03-08 RX ADMIN — TAMSULOSIN HYDROCHLORIDE 0.4 MG: 0.4 CAPSULE ORAL at 21:20

## 2019-03-08 RX ADMIN — METFORMIN HYDROCHLORIDE 1000 MG: 1000 TABLET, FILM COATED ORAL at 18:17

## 2019-03-08 RX ADMIN — ATORVASTATIN CALCIUM 40 MG: 40 TABLET, FILM COATED ORAL at 21:20

## 2019-03-08 RX ADMIN — METFORMIN HYDROCHLORIDE 1000 MG: 1000 TABLET, FILM COATED ORAL at 09:00

## 2019-03-08 RX ADMIN — AZITHROMYCIN MONOHYDRATE 500 MG: 500 INJECTION, POWDER, LYOPHILIZED, FOR SOLUTION INTRAVENOUS at 10:30

## 2019-03-08 RX ADMIN — POTASSIUM CHLORIDE 40 MEQ: 20 TABLET, EXTENDED RELEASE ORAL at 09:00

## 2019-03-08 RX ADMIN — ALLOPURINOL 300 MG: 300 TABLET ORAL at 21:20

## 2019-03-08 RX ADMIN — DILTIAZEM HYDROCHLORIDE 120 MG: 120 CAPSULE, COATED, EXTENDED RELEASE ORAL at 09:00

## 2019-03-08 RX ADMIN — PNEUMOCOCCAL 13-VALENT CONJUGATE VACCINE 0.5 ML: 2.2; 2.2; 2.2; 2.2; 2.2; 4.4; 2.2; 2.2; 2.2; 2.2; 2.2; 2.2; 2.2 INJECTION, SUSPENSION INTRAMUSCULAR at 09:01

## 2019-03-08 RX ADMIN — TRAZODONE HYDROCHLORIDE 100 MG: 100 TABLET ORAL at 22:51

## 2019-03-08 RX ADMIN — MINOXIDIL 5 MG: 2.5 TABLET ORAL at 09:00

## 2019-03-08 RX ADMIN — WARFARIN SODIUM 5 MG: 5 TABLET ORAL at 18:19

## 2019-03-08 RX ADMIN — POTASSIUM CHLORIDE 40 MEQ: 20 TABLET, EXTENDED RELEASE ORAL at 18:17

## 2019-03-08 RX ADMIN — CEFTRIAXONE 1 G: 1 INJECTION, POWDER, FOR SOLUTION INTRAMUSCULAR; INTRAVENOUS at 09:00

## 2019-03-08 RX ADMIN — ACETAMINOPHEN 1000 MG: 500 TABLET, FILM COATED ORAL at 21:20

## 2019-03-08 RX ADMIN — SOTALOL HYDROCHLORIDE 80 MG: 80 TABLET ORAL at 21:20

## 2019-03-08 RX ADMIN — MINOXIDIL 5 MG: 2.5 TABLET ORAL at 21:20

## 2019-03-08 RX ADMIN — FAMOTIDINE 20 MG: 20 TABLET ORAL at 09:00

## 2019-03-08 RX ADMIN — FUROSEMIDE 20 MG: 20 TABLET ORAL at 09:00

## 2019-03-08 ASSESSMENT — PAIN SCALES - GENERAL
PAINLEVEL_OUTOF10: 0

## 2019-03-09 VITALS
RESPIRATION RATE: 22 BRPM | OXYGEN SATURATION: 95 % | WEIGHT: 201.72 LBS | SYSTOLIC BLOOD PRESSURE: 105 MMHG | DIASTOLIC BLOOD PRESSURE: 58 MMHG | BODY MASS INDEX: 30.57 KG/M2 | HEART RATE: 108 BPM | HEIGHT: 68 IN | TEMPERATURE: 98.2 F

## 2019-03-09 PROBLEM — I50.31 ACUTE DIASTOLIC (CONGESTIVE) HEART FAILURE (HCC): Status: ACTIVE | Noted: 2019-03-07

## 2019-03-09 LAB
ANION GAP SERPL CALCULATED.3IONS-SCNC: 11 MMOL/L (ref 3–16)
BASOPHILS ABSOLUTE: 0 K/UL (ref 0–0.2)
BASOPHILS RELATIVE PERCENT: 0.1 %
BUN BLDV-MCNC: 45 MG/DL (ref 7–20)
CALCIUM SERPL-MCNC: 8.1 MG/DL (ref 8.3–10.6)
CHLORIDE BLD-SCNC: 100 MMOL/L (ref 99–110)
CO2: 24 MMOL/L (ref 21–32)
CREAT SERPL-MCNC: 1.7 MG/DL (ref 0.8–1.3)
EOSINOPHILS ABSOLUTE: 0.1 K/UL (ref 0–0.6)
EOSINOPHILS RELATIVE PERCENT: 0.4 %
GFR AFRICAN AMERICAN: 48
GFR NON-AFRICAN AMERICAN: 39
GLUCOSE BLD-MCNC: 56 MG/DL (ref 70–99)
GLUCOSE BLD-MCNC: 73 MG/DL (ref 70–99)
GLUCOSE BLD-MCNC: 75 MG/DL (ref 70–99)
HCT VFR BLD CALC: 30 % (ref 40.5–52.5)
HEMOGLOBIN: 10.2 G/DL (ref 13.5–17.5)
INR BLD: 1.89 (ref 0.86–1.14)
LYMPHOCYTES ABSOLUTE: 0.9 K/UL (ref 1–5.1)
LYMPHOCYTES RELATIVE PERCENT: 3.7 %
MAGNESIUM: 2 MG/DL (ref 1.8–2.4)
MCH RBC QN AUTO: 30.5 PG (ref 26–34)
MCHC RBC AUTO-ENTMCNC: 33.9 G/DL (ref 31–36)
MCV RBC AUTO: 89.9 FL (ref 80–100)
MONOCYTES ABSOLUTE: 1.3 K/UL (ref 0–1.3)
MONOCYTES RELATIVE PERCENT: 5.8 %
NEUTROPHILS ABSOLUTE: 20.8 K/UL (ref 1.7–7.7)
NEUTROPHILS RELATIVE PERCENT: 90 %
PDW BLD-RTO: 14.8 % (ref 12.4–15.4)
PERFORMED ON: NORMAL
PERFORMED ON: NORMAL
PLATELET # BLD: 298 K/UL (ref 135–450)
PMV BLD AUTO: 8.8 FL (ref 5–10.5)
POTASSIUM SERPL-SCNC: 3.5 MMOL/L (ref 3.5–5.1)
PRO-BNP: ABNORMAL PG/ML (ref 0–449)
PROCALCITONIN: 3.95 NG/ML (ref 0–0.15)
PROTHROMBIN TIME: 21.5 SEC (ref 9.8–13)
RBC # BLD: 3.34 M/UL (ref 4.2–5.9)
SODIUM BLD-SCNC: 135 MMOL/L (ref 136–145)
WBC # BLD: 23.1 K/UL (ref 4–11)

## 2019-03-09 PROCEDURE — 83735 ASSAY OF MAGNESIUM: CPT

## 2019-03-09 PROCEDURE — 6370000000 HC RX 637 (ALT 250 FOR IP): Performed by: INTERNAL MEDICINE

## 2019-03-09 PROCEDURE — 84145 PROCALCITONIN (PCT): CPT

## 2019-03-09 PROCEDURE — 36415 COLL VENOUS BLD VENIPUNCTURE: CPT

## 2019-03-09 PROCEDURE — 99233 SBSQ HOSP IP/OBS HIGH 50: CPT | Performed by: INTERNAL MEDICINE

## 2019-03-09 PROCEDURE — 83880 ASSAY OF NATRIURETIC PEPTIDE: CPT

## 2019-03-09 PROCEDURE — 80048 BASIC METABOLIC PNL TOTAL CA: CPT

## 2019-03-09 PROCEDURE — 85025 COMPLETE CBC W/AUTO DIFF WBC: CPT

## 2019-03-09 PROCEDURE — 85610 PROTHROMBIN TIME: CPT

## 2019-03-09 RX ORDER — AMOXICILLIN AND CLAVULANATE POTASSIUM 875; 125 MG/1; MG/1
1 TABLET, FILM COATED ORAL 2 TIMES DAILY
Qty: 20 TABLET | Refills: 0 | Status: SHIPPED | OUTPATIENT
Start: 2019-03-09 | End: 2019-03-19

## 2019-03-09 RX ORDER — DILTIAZEM HYDROCHLORIDE 120 MG/1
120 CAPSULE, COATED, EXTENDED RELEASE ORAL DAILY
Qty: 180 CAPSULE | Refills: 3 | Status: SHIPPED | OUTPATIENT
Start: 2019-03-09 | End: 2020-02-10

## 2019-03-09 RX ADMIN — POTASSIUM CHLORIDE 40 MEQ: 20 TABLET, EXTENDED RELEASE ORAL at 09:19

## 2019-03-09 RX ADMIN — GLIMEPIRIDE 4 MG: 4 TABLET ORAL at 09:18

## 2019-03-09 RX ADMIN — FAMOTIDINE 20 MG: 20 TABLET ORAL at 09:18

## 2019-03-09 RX ADMIN — LEVOTHYROXINE SODIUM 150 MCG: 150 TABLET ORAL at 06:55

## 2019-03-09 RX ADMIN — MINOXIDIL 5 MG: 2.5 TABLET ORAL at 09:18

## 2019-03-09 RX ADMIN — METFORMIN HYDROCHLORIDE 1000 MG: 1000 TABLET, FILM COATED ORAL at 09:19

## 2019-03-09 RX ADMIN — SOTALOL HYDROCHLORIDE 80 MG: 80 TABLET ORAL at 09:18

## 2019-03-09 RX ADMIN — ASPIRIN 81 MG 81 MG: 81 TABLET ORAL at 09:18

## 2019-03-09 RX ADMIN — FUROSEMIDE 20 MG: 20 TABLET ORAL at 09:18

## 2019-03-09 RX ADMIN — DILTIAZEM HYDROCHLORIDE 120 MG: 120 CAPSULE, COATED, EXTENDED RELEASE ORAL at 09:18

## 2019-03-09 ASSESSMENT — PAIN SCALES - GENERAL
PAINLEVEL_OUTOF10: 0

## 2019-03-10 ENCOUNTER — CARE COORDINATION (OUTPATIENT)
Dept: CASE MANAGEMENT | Age: 74
End: 2019-03-10

## 2019-03-12 ENCOUNTER — NURSE ONLY (OUTPATIENT)
Dept: CARDIOLOGY CLINIC | Age: 74
End: 2019-03-12
Payer: MEDICARE

## 2019-03-12 DIAGNOSIS — R00.1 BRADYCARDIA: ICD-10-CM

## 2019-03-12 DIAGNOSIS — I48.0 PAROXYSMAL ATRIAL FIBRILLATION (HCC): ICD-10-CM

## 2019-03-12 DIAGNOSIS — I49.5 SICK SINUS SYNDROME (HCC): ICD-10-CM

## 2019-03-12 DIAGNOSIS — Z95.0 CARDIAC PACEMAKER IN SITU: ICD-10-CM

## 2019-03-12 PROCEDURE — 93294 REM INTERROG EVL PM/LDLS PM: CPT | Performed by: INTERNAL MEDICINE

## 2019-03-12 PROCEDURE — 93296 REM INTERROG EVL PM/IDS: CPT | Performed by: INTERNAL MEDICINE

## 2019-03-14 ENCOUNTER — OFFICE VISIT (OUTPATIENT)
Dept: CARDIOLOGY CLINIC | Age: 74
End: 2019-03-14
Payer: MEDICARE

## 2019-03-14 VITALS
SYSTOLIC BLOOD PRESSURE: 120 MMHG | HEART RATE: 80 BPM | DIASTOLIC BLOOD PRESSURE: 50 MMHG | BODY MASS INDEX: 31.35 KG/M2 | WEIGHT: 206.2 LBS

## 2019-03-14 DIAGNOSIS — I50.31 ACUTE DIASTOLIC (CONGESTIVE) HEART FAILURE (HCC): Primary | ICD-10-CM

## 2019-03-14 DIAGNOSIS — I10 ESSENTIAL HYPERTENSION: ICD-10-CM

## 2019-03-14 DIAGNOSIS — I25.119 CORONARY ARTERY DISEASE INVOLVING NATIVE CORONARY ARTERY OF NATIVE HEART WITH ANGINA PECTORIS (HCC): ICD-10-CM

## 2019-03-14 DIAGNOSIS — I25.2 OLD MYOCARDIAL INFARCTION: ICD-10-CM

## 2019-03-14 DIAGNOSIS — R55 SYNCOPE, UNSPECIFIED SYNCOPE TYPE: ICD-10-CM

## 2019-03-14 DIAGNOSIS — I48.19 PERSISTENT ATRIAL FIBRILLATION (HCC): ICD-10-CM

## 2019-03-14 DIAGNOSIS — I49.5 SICK SINUS SYNDROME (HCC): ICD-10-CM

## 2019-03-14 PROCEDURE — G8482 FLU IMMUNIZE ORDER/ADMIN: HCPCS | Performed by: INTERNAL MEDICINE

## 2019-03-14 PROCEDURE — 3017F COLORECTAL CA SCREEN DOC REV: CPT | Performed by: INTERNAL MEDICINE

## 2019-03-14 PROCEDURE — G8417 CALC BMI ABV UP PARAM F/U: HCPCS | Performed by: INTERNAL MEDICINE

## 2019-03-14 PROCEDURE — 1101F PT FALLS ASSESS-DOCD LE1/YR: CPT | Performed by: INTERNAL MEDICINE

## 2019-03-14 PROCEDURE — 1111F DSCHRG MED/CURRENT MED MERGE: CPT | Performed by: INTERNAL MEDICINE

## 2019-03-14 PROCEDURE — 1123F ACP DISCUSS/DSCN MKR DOCD: CPT | Performed by: INTERNAL MEDICINE

## 2019-03-14 PROCEDURE — G8428 CUR MEDS NOT DOCUMENT: HCPCS | Performed by: INTERNAL MEDICINE

## 2019-03-14 PROCEDURE — G8598 ASA/ANTIPLAT THER USED: HCPCS | Performed by: INTERNAL MEDICINE

## 2019-03-14 PROCEDURE — 99215 OFFICE O/P EST HI 40 MIN: CPT | Performed by: INTERNAL MEDICINE

## 2019-03-14 PROCEDURE — 4040F PNEUMOC VAC/ADMIN/RCVD: CPT | Performed by: INTERNAL MEDICINE

## 2019-03-14 PROCEDURE — 1036F TOBACCO NON-USER: CPT | Performed by: INTERNAL MEDICINE

## 2019-03-14 RX ORDER — FUROSEMIDE 20 MG/1
60 TABLET ORAL DAILY
Qty: 120 TABLET | Refills: 5 | Status: SHIPPED | OUTPATIENT
Start: 2019-03-14 | End: 2020-03-13 | Stop reason: SDUPTHER

## 2019-03-14 RX ORDER — SPIRONOLACTONE 25 MG/1
25 TABLET ORAL DAILY
Qty: 30 TABLET | Refills: 5 | Status: SHIPPED | OUTPATIENT
Start: 2019-03-14 | End: 2019-09-05 | Stop reason: SDUPTHER

## 2019-03-15 PROBLEM — J18.9 PNEUMONIA: Status: ACTIVE | Noted: 2019-03-01

## 2019-03-21 DIAGNOSIS — I50.31 ACUTE DIASTOLIC (CONGESTIVE) HEART FAILURE (HCC): ICD-10-CM

## 2019-03-21 LAB
ANION GAP SERPL CALCULATED.3IONS-SCNC: 16 MMOL/L (ref 3–16)
BUN BLDV-MCNC: 25 MG/DL (ref 7–20)
CALCIUM SERPL-MCNC: 8.9 MG/DL (ref 8.3–10.6)
CHLORIDE BLD-SCNC: 101 MMOL/L (ref 99–110)
CO2: 24 MMOL/L (ref 21–32)
CREAT SERPL-MCNC: 1.2 MG/DL (ref 0.8–1.3)
GFR AFRICAN AMERICAN: >60
GFR NON-AFRICAN AMERICAN: 59
GLUCOSE BLD-MCNC: 249 MG/DL (ref 70–99)
POTASSIUM SERPL-SCNC: 4.8 MMOL/L (ref 3.5–5.1)
SODIUM BLD-SCNC: 141 MMOL/L (ref 136–145)

## 2019-03-28 ENCOUNTER — OFFICE VISIT (OUTPATIENT)
Dept: CARDIOLOGY CLINIC | Age: 74
End: 2019-03-28
Payer: MEDICARE

## 2019-03-28 VITALS
BODY MASS INDEX: 29.16 KG/M2 | DIASTOLIC BLOOD PRESSURE: 60 MMHG | WEIGHT: 192.4 LBS | SYSTOLIC BLOOD PRESSURE: 130 MMHG | HEART RATE: 76 BPM | HEIGHT: 68 IN

## 2019-03-28 DIAGNOSIS — I10 ESSENTIAL HYPERTENSION: ICD-10-CM

## 2019-03-28 DIAGNOSIS — I35.0 NONRHEUMATIC AORTIC VALVE STENOSIS: ICD-10-CM

## 2019-03-28 DIAGNOSIS — I25.10 CORONARY ARTERY DISEASE INVOLVING NATIVE CORONARY ARTERY OF NATIVE HEART WITHOUT ANGINA PECTORIS: ICD-10-CM

## 2019-03-28 DIAGNOSIS — I50.32 CHRONIC DIASTOLIC (CONGESTIVE) HEART FAILURE (HCC): Primary | ICD-10-CM

## 2019-03-28 DIAGNOSIS — I49.5 SICK SINUS SYNDROME (HCC): ICD-10-CM

## 2019-03-28 DIAGNOSIS — I48.0 PAROXYSMAL ATRIAL FIBRILLATION (HCC): ICD-10-CM

## 2019-03-28 PROCEDURE — 3017F COLORECTAL CA SCREEN DOC REV: CPT | Performed by: INTERNAL MEDICINE

## 2019-03-28 PROCEDURE — 1036F TOBACCO NON-USER: CPT | Performed by: INTERNAL MEDICINE

## 2019-03-28 PROCEDURE — 1123F ACP DISCUSS/DSCN MKR DOCD: CPT | Performed by: INTERNAL MEDICINE

## 2019-03-28 PROCEDURE — 4040F PNEUMOC VAC/ADMIN/RCVD: CPT | Performed by: INTERNAL MEDICINE

## 2019-03-28 PROCEDURE — 1111F DSCHRG MED/CURRENT MED MERGE: CPT | Performed by: INTERNAL MEDICINE

## 2019-03-28 PROCEDURE — G8427 DOCREV CUR MEDS BY ELIG CLIN: HCPCS | Performed by: INTERNAL MEDICINE

## 2019-03-28 PROCEDURE — G8417 CALC BMI ABV UP PARAM F/U: HCPCS | Performed by: INTERNAL MEDICINE

## 2019-03-28 PROCEDURE — 99214 OFFICE O/P EST MOD 30 MIN: CPT | Performed by: INTERNAL MEDICINE

## 2019-03-28 PROCEDURE — G8598 ASA/ANTIPLAT THER USED: HCPCS | Performed by: INTERNAL MEDICINE

## 2019-03-28 PROCEDURE — G8482 FLU IMMUNIZE ORDER/ADMIN: HCPCS | Performed by: INTERNAL MEDICINE

## 2019-03-28 RX ORDER — SOTALOL HYDROCHLORIDE 80 MG/1
80 TABLET ORAL 2 TIMES DAILY
Qty: 60 TABLET | Refills: 5 | Status: SHIPPED | OUTPATIENT
Start: 2019-03-28 | End: 2019-07-02 | Stop reason: SDUPTHER

## 2019-03-29 RX ORDER — SOTALOL HYDROCHLORIDE 80 MG/1
80 TABLET ORAL 2 TIMES DAILY
Qty: 180 TABLET | Refills: 3 | Status: SHIPPED | OUTPATIENT
Start: 2019-03-29 | End: 2020-04-10 | Stop reason: SDUPTHER

## 2019-03-29 NOTE — PROGRESS NOTES
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 D/c'd BB, started dilt and low dose sotalol with low AF burden. Noted to have greater than 7 second pause on outpatient ambulatory monitor. S/p dual-chamber Medtronic PPM on 2/15/2017 (Dr. Jamal Barrera). Today: Patient is here for f/u. He recently was admitted for PNA and CHF, noted to be in AF/RVR and sotalol was increased to 80 mg BID. He feels much better today however still has some SAWANT. Edema has improved. Remote device check shows pAF with a 5 day episode. Device check in the office today shows pAF. Patient is asymptomatic with AF. Home medications:   Current Outpatient Medications on File Prior to Visit   Medication Sig Dispense Refill    sotalol (BETAPACE) 80 MG tablet Take 1 tablet by mouth 2 times daily 180 tablet 3    sotalol (BETAPACE) 80 MG tablet Take 1 tablet by mouth 2 times daily 60 tablet 5    spironolactone (ALDACTONE) 25 MG tablet Take 1 tablet by mouth daily 30 tablet 5    furosemide (LASIX) 20 MG tablet Take 3 tablets by mouth daily 120 tablet 5    diltiazem (CARTIA XT) 120 MG extended release capsule Take 1 capsule by mouth daily 180 capsule 3    warfarin (COUMADIN) 7.5 MG tablet PATIENT TAKES 5MG ON MON, WED, AND FRI. THEN 7.5MG ON THE OTHER FOUR DAYS. 48 tablet 3    warfarin (COUMADIN) 7.5 MG tablet PATIENT TAKES 5MG ON MON, WED, AND FRI. THEN 7.5MG ON THE OTHER FOUR DAYS.  135 tablet 3    minoxidil (LONITEN) 10 MG tablet TAKE 1/2 TABLET BY MOUTH 2 TIMES A  tablet 3    warfarin (COUMADIN) 5 MG tablet TAKE AS MS VARYING SCHEDULE 30 tablet 5    cetirizine (ZYRTEC) 10 MG tablet Take 10 mg by mouth · Gastrointestinal: No abdominal pain, blood in stools. · Genitourinary: No dysuria, or hematuria. · Musculoskeletal: No gait disturbance,    · Integumentary: No rash or pruritis. · Neurological: No headache, change in muscle strength, numbness or tingling. · Psychiatric: No anxiety, or depression. · Endocrine: No temperature intolerance. No excessive thirst, fluid intake, or urination. · Hem/Lymph: No abnormal bruising or bleeding, blood clots or swollen lymph nodes. · Allergic/Immunologic: No nasal congestion or hives. Physical Examination:  Vitals:    04/02/19 1328   BP: 110/70   Pulse: 92   SpO2: 96%        Wt Readings from Last 3 Encounters:   04/02/19 187 lb 9.6 oz (85.1 kg)   03/28/19 192 lb 6.4 oz (87.3 kg)   03/14/19 206 lb 3.2 oz (93.5 kg)     · Constitutional: Oriented. No distress. · Head: Normocephalic and atraumatic. · Mouth/Throat: Oropharynx is clear and moist.   · Eyes: Conjunctivae normal. EOM are normal.   · Neck: Neck supple. No rigidity. No JVD present. · Cardiovascular: Normal rate, regular rhythm, S1&S2. sys m  · Pulmonary/Chest: Bilateral respiratory sounds. No wheezes, No rhonchi. · Abdominal: Soft. Bowel sounds present. No distension, No tenderness. · Musculoskeletal: No tenderness. No edema    · Lymphadenopathy: Has no cervical adenopathy. · Neurological: Alert and oriented. Cranial nerve appears intact, No Gross deficit   · Skin: Skin is warm and dry. No rash noted. Left chest incision has healed well. · Psychiatric: Has a normal mood, affect and behavior       Labs:  Reviewed.      Lab Results   Component Value Date    CKTOTAL 97 02/04/2013    TROPONINI <0.01 03/06/2019     Lab Results   Component Value Date    .0 11/30/2015     Lab Results   Component Value Date    PROTIME 21.5 03/09/2019    PROTIME 42.6 03/08/2019    PROTIME 96.7 03/07/2019    INR 1.89 03/09/2019    INR 3.74 03/08/2019    INR 8.48 03/07/2019     Lab Results   Component Value Date CHOL 118 12/18/2018    HDL 56 12/18/2018    HDL 51 09/13/2011    TRIG 54 12/18/2018       ECG: Personally reviewed: AF, , QRS 98, QTc 433    ECHO: Limited 3/7/2019  Summary   Limited study. Left ventricular cavity size is normal. There is mild to   moderate concentric left ventricular hypertrophy. Overall left ventricular   systolic function appears normal with an ejection fraction of 55-60%. No   regional wall motion abnormalities are noted. The mitral valve leaflets are   slightly thickened with normal leaflet mobility. Mitral annular   calcification is present. The aortic valve is thickened/calcified with   decreased leaflet mobility consistent with moderate aortic stenosis. No   doppler interrogation was performed. Bi-atrial enlargement. The IVC is   dilated . 2/12/2019   Summary   Left ventricular cavity size is normal with mild to moderate concentric left   ventricular hypertrophy.   Overall left ventricular systolic function appears normal with an ejection   fraction of 55-60%.   No regional wall motion abnormalities   Diastolic filling parameters suggest grade III diastolic dysfunction.   The mitral valve leaflets are slightly thickened with normal leaflet   mobility.   Mitral annular calcification   The aortic valve is thickened/calcified.   Moderate to severe aortic stenosis with a peak velocity of 3.7m/s and a mean   pressure gradient of 32mmHg.   Mild aortic regurgitation.   Mild tricuspid regurgitation.   Estimated pulmonary artery systolic pressure is at 36 mmHg assuming a right   atrial pressure of 3 mmHg.   Bi-atrial enlargement. Compare to prior echo on (7/19/16).   Small pericardial effusion       Stress Test: 8/28/2014  Summary   Left ventricular ejection fraction of 70 %. There is normal isotope uptake at stress and rest. There is no evidence of   myocardial ischemia or scar.    The LV wall motion is normal     Cardiac Angiography: None available      Patient Active Problem List evidence of bleeding  - S/p dual chamber Medtronic PPM 2/15/2017 (Dr. Hugh Pacheco)  - Device check today shows 8.6 % AP, 1.8 % , other parameters stable. 1 episode of AT/AF, longest 7 days, burden 28%. - Will have patient f/u with Dr. José Manuel Hernandez in 8 weeks - consider changing to dofetilide for rhythm management  - ECG ordered and results personally reviewed     Chronic diastolic HF  - Appears euvolemic  - Follows with Dr. Marilou Smith    HTN  - BP controlled  - On dilt 120 mg, minoxidil 10 mg  - On spironolactone - labs due next Thursday    Mod to sev AS  - Mild in 2016  - Consider repeat echo in 6 months   - Follows with Dr. Melody Duarte    EF of 75-56%  No systolic HF  ASA and Statin for CAD  Anticoagulation for AF   No tobacco use    All questions and concerns were addressed to the patient/family. Alternatives to my treatment were discussed. The note was completed using EMR. Every effort was made to ensure accuracy; however, inadvertent computerized transcription errors may be present.      Thank you for allowing me to participate in the care of [address] [facility]

## 2019-04-02 ENCOUNTER — CARE COORDINATION (OUTPATIENT)
Dept: CARE COORDINATION | Age: 74
End: 2019-04-02

## 2019-04-02 ENCOUNTER — NURSE ONLY (OUTPATIENT)
Dept: CARDIOLOGY CLINIC | Age: 74
End: 2019-04-02
Payer: MEDICARE

## 2019-04-02 ENCOUNTER — OFFICE VISIT (OUTPATIENT)
Dept: CARDIOLOGY CLINIC | Age: 74
End: 2019-04-02
Payer: MEDICARE

## 2019-04-02 VITALS
DIASTOLIC BLOOD PRESSURE: 70 MMHG | SYSTOLIC BLOOD PRESSURE: 110 MMHG | OXYGEN SATURATION: 96 % | HEIGHT: 68 IN | WEIGHT: 187.6 LBS | HEART RATE: 92 BPM | BODY MASS INDEX: 28.43 KG/M2

## 2019-04-02 DIAGNOSIS — I10 ESSENTIAL HYPERTENSION: ICD-10-CM

## 2019-04-02 DIAGNOSIS — R00.1 BRADYCARDIA: ICD-10-CM

## 2019-04-02 DIAGNOSIS — Z95.0 PACEMAKER: ICD-10-CM

## 2019-04-02 DIAGNOSIS — I50.32 CHRONIC DIASTOLIC HEART FAILURE (HCC): ICD-10-CM

## 2019-04-02 DIAGNOSIS — I49.5 SICK SINUS SYNDROME (HCC): ICD-10-CM

## 2019-04-02 DIAGNOSIS — I48.0 PAROXYSMAL ATRIAL FIBRILLATION (HCC): ICD-10-CM

## 2019-04-02 DIAGNOSIS — Z95.0 CARDIAC PACEMAKER IN SITU: ICD-10-CM

## 2019-04-02 DIAGNOSIS — I35.0 NONRHEUMATIC AORTIC VALVE STENOSIS: ICD-10-CM

## 2019-04-02 DIAGNOSIS — I48.19 PERSISTENT ATRIAL FIBRILLATION (HCC): Primary | ICD-10-CM

## 2019-04-02 PROCEDURE — 1036F TOBACCO NON-USER: CPT | Performed by: NURSE PRACTITIONER

## 2019-04-02 PROCEDURE — 93000 ELECTROCARDIOGRAM COMPLETE: CPT | Performed by: NURSE PRACTITIONER

## 2019-04-02 PROCEDURE — G8417 CALC BMI ABV UP PARAM F/U: HCPCS | Performed by: NURSE PRACTITIONER

## 2019-04-02 PROCEDURE — 99214 OFFICE O/P EST MOD 30 MIN: CPT | Performed by: NURSE PRACTITIONER

## 2019-04-02 PROCEDURE — 3017F COLORECTAL CA SCREEN DOC REV: CPT | Performed by: NURSE PRACTITIONER

## 2019-04-02 PROCEDURE — 93288 INTERROG EVL PM/LDLS PM IP: CPT | Performed by: INTERNAL MEDICINE

## 2019-04-02 PROCEDURE — 1123F ACP DISCUSS/DSCN MKR DOCD: CPT | Performed by: NURSE PRACTITIONER

## 2019-04-02 PROCEDURE — 4040F PNEUMOC VAC/ADMIN/RCVD: CPT | Performed by: NURSE PRACTITIONER

## 2019-04-02 PROCEDURE — 1111F DSCHRG MED/CURRENT MED MERGE: CPT | Performed by: NURSE PRACTITIONER

## 2019-04-02 PROCEDURE — G8427 DOCREV CUR MEDS BY ELIG CLIN: HCPCS | Performed by: NURSE PRACTITIONER

## 2019-04-02 PROCEDURE — G8598 ASA/ANTIPLAT THER USED: HCPCS | Performed by: NURSE PRACTITIONER

## 2019-04-02 NOTE — CARE COORDINATION
[facility] Transitions Follow Up Call    2019    Patient: Efra Mendoza  Patient : 1945   MRN: [id number]  Reason for Admission:   Discharge Date: 3/9/19 RARS: Readmission Risk Score: 23         Spoke with: Kamala Malcolm, patient    Care Transitions Subsequent and Final Call    Subsequent and Final Calls  Care Transitions Interventions  Other Interventions: Follow Up:  Spoke with patient. He states he is doing well. He states that his edema has decreased and coughing has decreased. Denies having any current signs/symptoms. He continues to weigh himself daily and states his weight is going down. He also states that he is now able to work 1/2 day. He has an appointment with Rachel Borrero CNP this afternoon. He is aware to contact his MD with any changes or concerns. No needs or concerns at this time. Will continue to follow.         Future Appointments   Date Time Provider Conchita Arroyo   2019  1:00 PM NAKIA Kidd CNP [facility]   2019  2:30 PM MD Lizett Frye [facility]   2019  1:30 PM MD Lizett Hodges [facility]   2019  3:20 AM Sheldon Villafana PHONE TRANSMISSION [place] [facility]       Nicole Tobin RN   Care Transitions Coordinator

## 2019-04-11 DIAGNOSIS — I10 ESSENTIAL HYPERTENSION: ICD-10-CM

## 2019-04-11 LAB
ANION GAP SERPL CALCULATED.3IONS-SCNC: 14 MMOL/L (ref 3–16)
BUN BLDV-MCNC: 58 MG/DL (ref 7–20)
CALCIUM SERPL-MCNC: 9.6 MG/DL (ref 8.3–10.6)
CHLORIDE BLD-SCNC: 102 MMOL/L (ref 99–110)
CO2: 26 MMOL/L (ref 21–32)
CREAT SERPL-MCNC: 1.4 MG/DL (ref 0.8–1.3)
GFR AFRICAN AMERICAN: 60
GFR NON-AFRICAN AMERICAN: 49
GLUCOSE BLD-MCNC: 144 MG/DL (ref 70–99)
POTASSIUM SERPL-SCNC: 4.9 MMOL/L (ref 3.5–5.1)
SODIUM BLD-SCNC: 142 MMOL/L (ref 136–145)

## 2019-04-15 ENCOUNTER — CARE COORDINATION (OUTPATIENT)
Dept: CASE MANAGEMENT | Age: 74
End: 2019-04-15

## 2019-04-15 NOTE — CARE COORDINATION
[facility] Transitions Follow Up Call    4/15/2019    Patient: Andi Chatman  Patient : 1945   MRN: <[id number]>  Reason for Admission: No admission diagnoses are documented for this encounter. Discharge Date: 3/9/19 RARS: Readmission Risk Score: 23    Attempted to contact patient for Care Transitions [facility]PCI-A follow up. Message left for patient to return call. Contact information for CTC provided. Central Team BPCI-A will continue to follow. Care Transitions Subsequent and Final Call    Subsequent and Final Calls  Care Transitions Interventions  Other Interventions:             Follow Up  Future Appointments   Date Time Provider Conchita Arroyo   2019  2:30 PM Shelbie Tapia MD [facility] Card [facility]   2019 10:45 AM Kadie Solis MD [facility] Card [facility]   2019 11:00 AM [employer] [facility]   2019  1:30 PM Pola Dixon MD [place] [facility]   2019  3:20 AM [employer] PHONE TRANSMISSION [place] [facility]       Jemal Fountain, RN  Care Transition Coordinator  (H) [phone]  21

## 2019-04-26 ENCOUNTER — CARE COORDINATION (OUTPATIENT)
Dept: CASE MANAGEMENT | Age: 74
End: 2019-04-26

## 2019-04-26 NOTE — CARE COORDINATION
[facility] Transitions Follow Up Call    2019    Patient: Latha Courser  Patient : 1945   MRN: [id number]  Reason for Admission:   Discharge Date: 3/9/19 RARS: Readmission Risk Score: 23        Care Transitions Subsequent and Final Call    Subsequent and Final Calls  Care Transitions Interventions  Other Interventions: Follow Up: Attempted to contact patient for follow up BPCI-A transition call. Left message on answering machine and voicemail with contact information and request for return phone call.        Future Appointments   Date Time Provider Conchita Arroyo   2019  2:30 PM Sinan Smith MD [facility] Card [facility]   2019 10:45 AM Marily Rosa MD [facility] Card [facility]   2019 11:00 AM Jorge Luis Dumas PACERS [facility]   2019  1:30 PM Elizabeth Medrano MD [facility]   2019  3:20 AM Jorge Luis Dumas PHONE TRANSMISSION [facility]       Roxy Beltran RN

## 2019-04-30 ENCOUNTER — OFFICE VISIT (OUTPATIENT)
Dept: CARDIOLOGY CLINIC | Age: 74
End: 2019-04-30
Payer: MEDICARE

## 2019-04-30 VITALS
SYSTOLIC BLOOD PRESSURE: 130 MMHG | WEIGHT: 186 LBS | BODY MASS INDEX: 28.28 KG/M2 | HEART RATE: 76 BPM | DIASTOLIC BLOOD PRESSURE: 60 MMHG

## 2019-04-30 DIAGNOSIS — I50.32 CHRONIC DIASTOLIC CONGESTIVE HEART FAILURE (HCC): ICD-10-CM

## 2019-04-30 DIAGNOSIS — I35.0 NONRHEUMATIC AORTIC VALVE STENOSIS: ICD-10-CM

## 2019-04-30 DIAGNOSIS — I48.0 PAROXYSMAL ATRIAL FIBRILLATION (HCC): ICD-10-CM

## 2019-04-30 DIAGNOSIS — Z95.0 PACEMAKER: ICD-10-CM

## 2019-04-30 DIAGNOSIS — I25.10 CAD IN NATIVE ARTERY: Primary | ICD-10-CM

## 2019-04-30 DIAGNOSIS — I49.5 SSS (SICK SINUS SYNDROME) (HCC): ICD-10-CM

## 2019-04-30 DIAGNOSIS — Z98.61 HISTORY OF PTCA: ICD-10-CM

## 2019-04-30 PROCEDURE — G8427 DOCREV CUR MEDS BY ELIG CLIN: HCPCS | Performed by: INTERNAL MEDICINE

## 2019-04-30 PROCEDURE — 99214 OFFICE O/P EST MOD 30 MIN: CPT | Performed by: INTERNAL MEDICINE

## 2019-04-30 PROCEDURE — G8417 CALC BMI ABV UP PARAM F/U: HCPCS | Performed by: INTERNAL MEDICINE

## 2019-04-30 PROCEDURE — 3017F COLORECTAL CA SCREEN DOC REV: CPT | Performed by: INTERNAL MEDICINE

## 2019-04-30 PROCEDURE — G8598 ASA/ANTIPLAT THER USED: HCPCS | Performed by: INTERNAL MEDICINE

## 2019-04-30 PROCEDURE — 1036F TOBACCO NON-USER: CPT | Performed by: INTERNAL MEDICINE

## 2019-04-30 PROCEDURE — 1123F ACP DISCUSS/DSCN MKR DOCD: CPT | Performed by: INTERNAL MEDICINE

## 2019-04-30 PROCEDURE — 4040F PNEUMOC VAC/ADMIN/RCVD: CPT | Performed by: INTERNAL MEDICINE

## 2019-04-30 ASSESSMENT — ENCOUNTER SYMPTOMS
APNEA: 0
CHOKING: 0
ABDOMINAL DISTENTION: 0
SHORTNESS OF BREATH: 0
COUGH: 0
CHEST TIGHTNESS: 0

## 2019-04-30 NOTE — PROGRESS NOTES
Subjective:      Patient ID: Laura Duarte is a 74 y.o. male. Hypertension   Pertinent negatives include no chest pain, palpitations or shortness of breath. Coronary Artery Disease   Pertinent negatives include no chest pain, chest tightness, dizziness, leg swelling, palpitations or shortness of breath. His past medical history is significant for CHF. Shortness of Breath   Pertinent negatives include no chest pain or leg swelling. His past medical history is significant for CAD. Congestive Heart Failure   Pertinent negatives include no chest pain, fatigue, palpitations or shortness of breath. His past medical history is significant for CAD. Here for follow up afib/sss/pacer/htn/cad/CHF. Feeling better. Breathing better/baseline. No pnd or orthopne. No edema. Wt down. BP good. No chest pain. No sob. Few episodes of afib. Lasting up to day. HR controlled with episodes. No syncope. Past Medical History:   Diagnosis Date    Aortic stenosis 2015 Feb 2019 mean gradient 35mg Hg    [facility] 07/2012    Saw cardiologist for palps. Event recorder showed AF about 5% of the time. Warfarin started. Became persistent in March 2019 when admitted with pneumonia.  CAD (coronary artery disease), native coronary artery Dec 18, 2007    Presented with palpitation to ER. Slight inc. in S. troponin. Cor. angio showed 90% L. circ. Stent placed. (Dr Carola Pinto.  Diabetes mellitus ([facility] 1992    HbA1C in Jennifer '15 was 6.9 on metformin and glipizide.  Diastolic congestive heart failure ([facility] 08/2006    presented with wheeze, [id number]. cardiac cath to r/o ischemia. Diffuse CAD no critical stenoses. LVEF 55% LVEDP 31.    Essential hypertension since 1980    Difficult to control with need for minoxidil in addition to ARB,CCB,BB and diuretic.  Gout     Podagra intermittently over the years. Uric acid 9.4 in 2011.  Allopurinol started (with colchicine for 3 months)    Hypothyroid March 2011    Total thyroidectomy for indeterminate nodule. Proved benign.  Left carotid bruit Oct 2014    Discovered at routine exam. Carotid Doppler showed bilateral stenosis <50%    Obstructive sleep apnea 1997    as of 2019 still using CPAP    Peripheral neuropathy     2/2 DM.  Pneumonia 57/5673    complicated by hypotension and overdiuresis leading to REYNALDO. S.Cr 1.7 on 3/9/19. Improved to 1.3 on 3/11/19    Sick sinus syndrome (HCC) 02/16/2017    PIP placed because of 7 sec pauses on sotalol. Past Surgical History:   Procedure Laterality Date    CHOLECYSTECTOMY, OPEN  44    CORONARY ANGIOPLASTY  12/17/07    DIAGNOSTIC CARDIAC CATH LAB PROCEDURE  12/17/07    PACEMAKER INSERTION  02/2017    Sick sinus sundrome with 7 sec pauses on sotalol.     THYROIDECTOMY  2011    Benign    TOTAL KNEE ARTHROPLASTY Right 3/12/14     Social History     Socioeconomic History    Marital status: Single     Spouse name: Not on file    Number of children: Not on file    Years of education: Not on file    Highest education level: Not on file   Occupational History    Not on file   Social Needs    Financial resource strain: Not on file    Food insecurity:     Worry: Not on file     Inability: Not on file    Transportation needs:     Medical: Not on file     Non-medical: Not on file   Tobacco Use    Smoking status: Never Smoker    Smokeless tobacco: Never Used   Substance and Sexual Activity    Alcohol use: No    Drug use: No    Sexual activity: Not on file     Comment: Single   Lifestyle    Physical activity:     Days per week: Not on file     Minutes per session: Not on file    Stress: Not on file   Relationships    Social connections:     Talks on phone: Not on file     Gets together: Not on file     Attends Jehovah's witness service: Not on file     Active member of club or organization: Not on file     Attends meetings of clubs or organizations: Not on file     Relationship status: Not on file    Intimate partner violence:     Fear of current or ex partner: Not on file     Emotionally abused: Not on file     Physically abused: Not on file     Forced sexual activity: Not on file   Other Topics Concern    Not on file   Social History Narrative    Not on file       FH reviewed, noncontributory. Vitals:    04/30/19 1447   BP: 130/60   Pulse: 76     Wt 186      Review of Systems   Constitutional: Negative for activity change and fatigue. Respiratory: Negative for apnea, cough, choking, chest tightness and shortness of breath. Cardiovascular: Negative for chest pain, palpitations and leg swelling. No PND or orthopnea. No tachycardia. Gastrointestinal: Negative for abdominal distention. Musculoskeletal: Negative for myalgias. Neurological: Negative for dizziness, syncope and light-headedness. Psychiatric/Behavioral: Negative for agitation, behavioral problems and confusion. Other systems reviewed negative as done. Objective:   Physical Exam   Constitutional: He is oriented to person, place, and time. He appears well-developed and well-nourished. No distress. HENT:   Head: Normocephalic and atraumatic. Eyes: Conjunctivae and EOM are normal. Right eye exhibits no discharge. Left eye exhibits no discharge. Neck: Normal range of motion. Neck supple. No JVD present. Cardiovascular: Normal rate, regular rhythm, S1 normal and S2 normal. Exam reveals no gallop. Murmur heard. Pulses:       Radial pulses are 2+ on the right side, and 2+ on the left side. 2/6 syst M   Pulmonary/Chest: Effort normal and breath sounds normal. He has no wheezes. He has no rales. Abdominal: Soft. Bowel sounds are normal. There is no tenderness. Musculoskeletal: Normal range of motion. He exhibits no edema. Neurological: He is alert and oriented to person, place, and time. Skin: Skin is warm and dry. Psychiatric: He has a normal mood and affect.  His behavior is normal. Thought content normal. Assessment:       Diagnosis Orders   1. CAD in native artery     2. History of PTCA     3. Paroxysmal atrial fibrillation (HCC)     4. Chronic diastolic congestive heart failure ([address]     5. SSS (sick sinus syndrome) ([facility])     6. Pacemaker     7. Nonrheumatic aortic valve stenosis               Plan:      CV stable. Rhythm stable. BP reasonable. EP following pacemaker/afib. Rhythm stable. Wt down. On AC, no bleeding issues. Recommended diet, exercise and wt loss. Reviewed previous records and testing including myoview 11/16 and echo 2/19. No changes. Continue to monitor. Follow up 3 months.

## 2019-05-16 ENCOUNTER — CARE COORDINATION (OUTPATIENT)
Dept: CASE MANAGEMENT | Age: 74
End: 2019-05-16

## 2019-05-29 NOTE — PROGRESS NOTES
[facility]   Cardiac Consultation    Referring Provider:  Ced Tinoco MD     Chief Concerns: PAF, SSS s/p Medtronic dual chamber pacemaker (2/15/17 by Dr Ashley Houston)    HPI:  Aaron Jason is a 66 y.o. male a new patient for PAF follow up. Patient has hx of CAD s/p PCI x 1 (2007) by Dr Cyntha Fleischer, mild AS, BRAD on nasal CPAP, NIDDM, hypothyroidism s/p total thyroidectomy, HTN, gout, PAF & SSS s/p Medtronic DDD pacemaker (2/15/17). He has with mild AS, normal LVEF, CAD (PCI in 2007 x 1), symptomatic AF and what appeared to be symptomatic post conversion pauses while on large dose of fito agents. D/c'd BB, started dilt and low dose sotalol with low AF burden. Noted to have greater than 7 second pause on outpatient ambulatory monitor. S/p dual-chamber Medtronic PPM on 2/15/2017 (Dr. Ashley Houston). Negative stress test in 2016. Device check on 5/30/19 shows 30% AP, 2.5% , 14% AF, other parameters stable with 3 years of battery life left. The patient states that he has about 1 episode a month of AF averaging about 12 hours. He feels fluttering during these episodes but his work is not limited. His sotalol was increased to 80mg BID in March in the hospital when he was admitted for CHF. Otherwise the patient denies complaints of CP, SOB, dizziness,orthopnea, or presycope/syncope. The patient plans to return to the gym in a couple months. Past Medical History:  Social History:   reports that he has never smoked. He has never used smokeless tobacco. He reports that he does not drink alcohol or use drugs. Family History:  family history includes Heart Attack in his maternal grandfather; Heart Defect in his maternal grandfather and mother; Heart Disease in his father; Heart Surgery in his father; Pacemaker in his mother.      Home Medications:  Outpatient Encounter Medications as of 5/30/2019   Medication Sig Dispense Refill    Mirabegron ER (MYRBETRIQ) 25 MG TB24 Take by mouth      sotalol (BETAPACE) 80 MG tablet Take 1 tablet by mouth 2 times daily 180 tablet 3    sotalol (BETAPACE) 80 MG tablet Take 1 tablet by mouth 2 times daily 60 tablet 5    spironolactone (ALDACTONE) 25 MG tablet Take 1 tablet by mouth daily 30 tablet 5    furosemide (LASIX) 20 MG tablet Take 3 tablets by mouth daily (Patient taking differently: Take 40 mg by mouth daily ) 120 tablet 5    diltiazem (CARTIA XT) 120 MG extended release capsule Take 1 capsule by mouth daily 180 capsule 3    warfarin (COUMADIN) 7.5 MG tablet PATIENT TAKES 5MG ON MON, WED, AND FRI. THEN 7.5MG ON THE OTHER FOUR DAYS. 48 tablet 3    warfarin (COUMADIN) 7.5 MG tablet PATIENT TAKES 5MG ON MON, WED, AND FRI. THEN 7.5MG ON THE OTHER FOUR DAYS. 135 tablet 3    minoxidil (LONITEN) 10 MG tablet TAKE 1/2 TABLET BY MOUTH 2 TIMES A  tablet 3    warfarin (COUMADIN) 5 MG tablet TAKE AS OK VARYING SCHEDULE 30 tablet 5    cetirizine (ZYRTEC) 10 MG tablet Take 10 mg by mouth daily      oxybutynin (DITROPAN) 5 MG/5ML syrup Take 5 mg by mouth as needed       glipiZIDE (GLUCOTROL) 5 MG tablet Take 5 mg by mouth 2 times daily       tamsulosin (FLOMAX) 0.4 MG capsule Take 0.4 mg by mouth nightly      traZODone (DESYREL) 100 MG tablet Take 100 mg by mouth nightly      atorvastatin (LIPITOR) 40 MG tablet Take 40 mg by mouth nightly       aspirin 81 MG tablet Take 81 mg by mouth daily.  diclofenac sodium (VOLTAREN) 1 % GEL Apply 2 g topically 2 times daily as needed       levothyroxine (SYNTHROID) 150 MCG tablet Take 150 mcg by mouth daily.  allopurinol (ZYLOPRIM) 300 MG tablet Take 300 mg by mouth nightly       gemfibrozil (LOPID) 600 MG tablet Take 600 mg by mouth 2 times daily (before meals).  metformin (GLUCOPHAGE) 1000 MG tablet Take 1,000 mg by mouth 2 times daily (with meals).         Multiple Vitamin (MULTIVITAMIN PO) Take 1 tablet by mouth daily       alprazolam (XANAX) 0.25 MG tablet Take 0.25 mg by mouth daily as needed for Anxiety        No facility-administered encounter medications on file as of 5/30/2019. Allergies:  Erythromycin     Review of Systems   Constitutional: Negative. HENT: Negative. Eyes: Negative. Respiratory: Negative. Cardiovascular: Negative. Gastrointestinal: Negative. Genitourinary: Negative. Musculoskeletal: Negative. Skin: Negative. Neurological: Negative. Hematological: Negative. Psychiatric/Behavioral: Negative. BP (!) 110/50   Pulse 64   Wt 187 lb 3.2 oz (84.9 kg)   BMI 28.46 kg/m²     EKG 5/30/19 Personally reviewed. SR 60     Objective:  Physical Exam   Constitutional: He is oriented to person, place, and time. He appears well-developed and well-nourished. HENT:   Head: Normocephalic and atraumatic. Eyes: Pupils are equal, round, and reactive to light. Neck: Normal range of motion. Cardiovascular: Normal rate, regular rhythm and  2/6SEM    Pulmonary/Chest: Effort normal and breath sounds normal.   Abdominal: Soft. No tenderness. Musculoskeletal: Normal range of motion. He exhibits no edema. Neurological: He is alert and oriented to person, place, and time. Skin: Skin is warm and dry. Psychiatric: He has a normal mood and affect. Assessment:  1. PAF/SSS- s/p Medtronic DDD pacemaker 2017. He is taking Cartia 120mg QD & Sotalol 80 mg BID. He is also on warfarin for stroke prevention. Dr Knott [place] office manages his INR. His last INR (11/28/17) was 2.47. He shows no S/S of bleeding. Device today shows AF at 14%. Discussed PVI as a treatment option as he has paroxysmal AF which is symptomatic and has not responded to pharmacologic therapy. 2. Thyroid nodule-s/p total thyroidectomy  3. BRAD- on nasal CPAP    Plan:  1. Continue medications including sotalol 80 mg daily. 2. F/U with Kristopher Santiago in 3 months and me in 6 month with device check  3. Continue activity as tolerated. 4. He will consider AF ablation.     Kishore Hurtado RN, am scribing for and in the presence of Dr. Kadie Solis. 05/30/19   11:14 AM  Darren Head, RN    I, Dr. Kadie Solis, personally performed the services described in this documentation as scribed by Darren Head RN  in my presence, and it is both accurate and complete.       Kadie Solis M.D.

## 2019-05-30 ENCOUNTER — OFFICE VISIT (OUTPATIENT)
Dept: CARDIOLOGY CLINIC | Age: 74
End: 2019-05-30
Payer: MEDICARE

## 2019-05-30 ENCOUNTER — PROCEDURE VISIT (OUTPATIENT)
Dept: CARDIOLOGY CLINIC | Age: 74
End: 2019-05-30
Payer: MEDICARE

## 2019-05-30 VITALS
HEART RATE: 64 BPM | DIASTOLIC BLOOD PRESSURE: 50 MMHG | WEIGHT: 187.2 LBS | SYSTOLIC BLOOD PRESSURE: 110 MMHG | BODY MASS INDEX: 28.46 KG/M2

## 2019-05-30 DIAGNOSIS — Z95.0 CARDIAC PACEMAKER IN SITU: ICD-10-CM

## 2019-05-30 DIAGNOSIS — I25.10 CAD IN NATIVE ARTERY: Primary | ICD-10-CM

## 2019-05-30 DIAGNOSIS — R00.1 BRADYCARDIA: ICD-10-CM

## 2019-05-30 DIAGNOSIS — I48.0 PAROXYSMAL ATRIAL FIBRILLATION (HCC): ICD-10-CM

## 2019-05-30 DIAGNOSIS — I49.5 SICK SINUS SYNDROME (HCC): ICD-10-CM

## 2019-05-30 PROCEDURE — 1036F TOBACCO NON-USER: CPT | Performed by: INTERNAL MEDICINE

## 2019-05-30 PROCEDURE — 93000 ELECTROCARDIOGRAM COMPLETE: CPT | Performed by: INTERNAL MEDICINE

## 2019-05-30 PROCEDURE — G8598 ASA/ANTIPLAT THER USED: HCPCS | Performed by: INTERNAL MEDICINE

## 2019-05-30 PROCEDURE — 99214 OFFICE O/P EST MOD 30 MIN: CPT | Performed by: INTERNAL MEDICINE

## 2019-05-30 PROCEDURE — 93280 PM DEVICE PROGR EVAL DUAL: CPT | Performed by: INTERNAL MEDICINE

## 2019-05-30 PROCEDURE — G8428 CUR MEDS NOT DOCUMENT: HCPCS | Performed by: INTERNAL MEDICINE

## 2019-05-30 PROCEDURE — G8417 CALC BMI ABV UP PARAM F/U: HCPCS | Performed by: INTERNAL MEDICINE

## 2019-05-30 PROCEDURE — 1123F ACP DISCUSS/DSCN MKR DOCD: CPT | Performed by: INTERNAL MEDICINE

## 2019-05-30 PROCEDURE — 4040F PNEUMOC VAC/ADMIN/RCVD: CPT | Performed by: INTERNAL MEDICINE

## 2019-05-30 NOTE — PROGRESS NOTES
Interrogation and programming evaluation of the device shows normal function, with stable sensing and pacing thresholds. See interrogation for details. The pt saw Dr Rodrigo Durant today. Recheck remotely 3 mos.

## 2019-06-05 ENCOUNTER — CARE COORDINATION (OUTPATIENT)
Dept: CASE MANAGEMENT | Age: 74
End: 2019-06-05

## 2019-07-02 ENCOUNTER — OFFICE VISIT (OUTPATIENT)
Dept: CARDIOLOGY CLINIC | Age: 74
End: 2019-07-02
Payer: MEDICARE

## 2019-07-02 VITALS
SYSTOLIC BLOOD PRESSURE: 120 MMHG | DIASTOLIC BLOOD PRESSURE: 56 MMHG | BODY MASS INDEX: 28.4 KG/M2 | HEART RATE: 76 BPM | WEIGHT: 186.8 LBS

## 2019-07-02 DIAGNOSIS — I50.32 CHRONIC DIASTOLIC (CONGESTIVE) HEART FAILURE (HCC): Primary | ICD-10-CM

## 2019-07-02 DIAGNOSIS — E78.5 HYPERLIPIDEMIA, UNSPECIFIED HYPERLIPIDEMIA TYPE: ICD-10-CM

## 2019-07-02 DIAGNOSIS — I25.2 OLD MYOCARDIAL INFARCTION: ICD-10-CM

## 2019-07-02 DIAGNOSIS — I10 ESSENTIAL HYPERTENSION: ICD-10-CM

## 2019-07-02 DIAGNOSIS — I48.0 PAROXYSMAL ATRIAL FIBRILLATION (HCC): ICD-10-CM

## 2019-07-02 DIAGNOSIS — I35.0 NONRHEUMATIC AORTIC VALVE STENOSIS: ICD-10-CM

## 2019-07-02 PROCEDURE — 99213 OFFICE O/P EST LOW 20 MIN: CPT | Performed by: INTERNAL MEDICINE

## 2019-07-02 PROCEDURE — G8427 DOCREV CUR MEDS BY ELIG CLIN: HCPCS | Performed by: INTERNAL MEDICINE

## 2019-07-02 PROCEDURE — G8417 CALC BMI ABV UP PARAM F/U: HCPCS | Performed by: INTERNAL MEDICINE

## 2019-07-02 PROCEDURE — 1036F TOBACCO NON-USER: CPT | Performed by: INTERNAL MEDICINE

## 2019-07-02 PROCEDURE — 1123F ACP DISCUSS/DSCN MKR DOCD: CPT | Performed by: INTERNAL MEDICINE

## 2019-07-02 PROCEDURE — 4040F PNEUMOC VAC/ADMIN/RCVD: CPT | Performed by: INTERNAL MEDICINE

## 2019-07-02 PROCEDURE — G8598 ASA/ANTIPLAT THER USED: HCPCS | Performed by: INTERNAL MEDICINE

## 2019-07-02 RX ORDER — FESOTERODINE FUMARATE 4 MG/1
4 TABLET, EXTENDED RELEASE ORAL DAILY
COMMUNITY
End: 2019-09-18 | Stop reason: SINTOL

## 2019-07-02 NOTE — PROGRESS NOTES
traZODone (DESYREL) 100 MG tablet Take 100 mg by mouth nightly   Yes Historical Provider, MD   atorvastatin (LIPITOR) 40 MG tablet Take 40 mg by mouth nightly    Yes Historical Provider, MD   aspirin 81 MG tablet Take 81 mg by mouth daily. Yes Historical Provider, MD   diclofenac sodium (VOLTAREN) 1 % GEL Apply 2 g topically 2 times daily as needed    Yes Historical Provider, MD   levothyroxine (SYNTHROID) 150 MCG tablet Take 150 mcg by mouth daily. Yes Historical Provider, MD   allopurinol (ZYLOPRIM) 300 MG tablet Take 300 mg by mouth nightly    Yes Historical Provider, MD   gemfibrozil (LOPID) 600 MG tablet Take 600 mg by mouth 2 times daily (before meals). Yes Historical Provider, MD   metformin (GLUCOPHAGE) 1000 MG tablet Take 1,000 mg by mouth 2 times daily (with meals). Yes Historical Provider, MD   Multiple Vitamin (MULTIVITAMIN PO) Take 1 tablet by mouth daily    Yes Historical Provider, MD   alprazolam (XANAX) 0.25 MG tablet Take 0.25 mg by mouth daily as needed for Anxiety    Yes Historical Provider, MD          Allergy:     Erythromycin       Review of Systems:     All 12 point review of symptoms completed. Pertinent positives identified in the HPI, all other review of symptoms negative as below. CONSTITUTIONAL: No fatigue  SKIN: No rash or pruritis. EYES: No visual changes or diplopia. No scleral icterus. ENT: No Headaches, hearing loss or vertigo. No mouth sores or sore throat. CARDIOVASCULAR: No chest pain/chest pressure/chest discomfort. No palpitations. No edema. RESPIRATORY: No dyspnea. No cough or wheezing, no sputum production. GASTROINTESTINAL: No N/V/D. No abdominal pain, appetite loss, blood in stools. GENITOURINARY: No dysuria, trouble voiding, or hematuria. MUSCULOSKELETAL:  No gait disturbance, weakness or joint complaints. NEUROLOGICAL: No headache, diplopia, change in muscle strength, numbness or tingling.  No change in gait, balance, coordination, mood, affect,

## 2019-08-06 ENCOUNTER — OFFICE VISIT (OUTPATIENT)
Dept: CARDIOLOGY CLINIC | Age: 74
End: 2019-08-06
Payer: MEDICARE

## 2019-08-06 VITALS
BODY MASS INDEX: 28.59 KG/M2 | DIASTOLIC BLOOD PRESSURE: 70 MMHG | WEIGHT: 188 LBS | HEART RATE: 68 BPM | SYSTOLIC BLOOD PRESSURE: 130 MMHG

## 2019-08-06 DIAGNOSIS — I25.10 CAD IN NATIVE ARTERY: Primary | ICD-10-CM

## 2019-08-06 DIAGNOSIS — I49.5 SSS (SICK SINUS SYNDROME) (HCC): ICD-10-CM

## 2019-08-06 DIAGNOSIS — Z98.61 HISTORY OF PTCA: ICD-10-CM

## 2019-08-06 DIAGNOSIS — I50.32 CHRONIC DIASTOLIC CONGESTIVE HEART FAILURE (HCC): ICD-10-CM

## 2019-08-06 DIAGNOSIS — Z95.0 PACEMAKER: ICD-10-CM

## 2019-08-06 DIAGNOSIS — I35.0 NONRHEUMATIC AORTIC VALVE STENOSIS: ICD-10-CM

## 2019-08-06 DIAGNOSIS — I48.0 PAROXYSMAL ATRIAL FIBRILLATION (HCC): ICD-10-CM

## 2019-08-06 PROCEDURE — 1036F TOBACCO NON-USER: CPT | Performed by: INTERNAL MEDICINE

## 2019-08-06 PROCEDURE — 4040F PNEUMOC VAC/ADMIN/RCVD: CPT | Performed by: INTERNAL MEDICINE

## 2019-08-06 PROCEDURE — G8417 CALC BMI ABV UP PARAM F/U: HCPCS | Performed by: INTERNAL MEDICINE

## 2019-08-06 PROCEDURE — 1123F ACP DISCUSS/DSCN MKR DOCD: CPT | Performed by: INTERNAL MEDICINE

## 2019-08-06 PROCEDURE — G8598 ASA/ANTIPLAT THER USED: HCPCS | Performed by: INTERNAL MEDICINE

## 2019-08-06 PROCEDURE — 99214 OFFICE O/P EST MOD 30 MIN: CPT | Performed by: INTERNAL MEDICINE

## 2019-08-06 PROCEDURE — G8427 DOCREV CUR MEDS BY ELIG CLIN: HCPCS | Performed by: INTERNAL MEDICINE

## 2019-08-06 ASSESSMENT — ENCOUNTER SYMPTOMS
ABDOMINAL DISTENTION: 0
CHOKING: 0
SHORTNESS OF BREATH: 0
APNEA: 0
CHEST TIGHTNESS: 0
COUGH: 0

## 2019-08-06 NOTE — PROGRESS NOTES
Subjective:      Patient ID: Drea Bruce is a 66 y.o. male. Hypertension   Pertinent negatives include no chest pain, palpitations or shortness of breath. Coronary Artery Disease   Pertinent negatives include no chest pain, chest tightness, dizziness, leg swelling, palpitations or shortness of breath. His past medical history is significant for CHF. Shortness of Breath   Pertinent negatives include no chest pain or leg swelling. His past medical history is significant for CAD. Congestive Heart Failure   Pertinent negatives include no chest pain, fatigue, palpitations or shortness of breath. His past medical history is significant for CAD. Here for follow up afib/sss/pacer/htn/cad/CHF. Feeling good. No pnd or orthopne. No edema. Wt down. BP good. No chest pain. No sob. Few episodes of afib. Back to exercising. No syncope. Past Medical History:   Diagnosis Date    Aortic stenosis 2015 Feb 2019 mean gradient 35mg Hg    Atrial fibrillation ([facility] 07/2012    Saw cardiologist for palps. Event recorder showed AF about 5% of the time. Warfarin started. Became persistent in March 2019 when admitted with pneumonia.  CAD (coronary artery disease), native coronary artery Dec 18, 2007    Presented with palpitation to ER. Slight inc. in S. troponin. Cor. angio showed 90% L. circ. Stent placed. (Dr Dilcia Guevara.  Diabetes mellitus ([facility] 1992    HbA1C in Jennifer '15 was 6.9 on metformin and glipizide.  Diastolic congestive heart failure ([facility] 08/2006    presented with wheeze, [id number]. cardiac cath to r/o ischemia. Diffuse CAD no critical stenoses. LVEF 55% LVEDP 31.    Essential hypertension since 1980    Difficult to control with need for minoxidil in addition to ARB,CCB,BB and diuretic.  Gout     Podagra intermittently over the years. Uric acid 9.4 in 2011. Allopurinol started (with colchicine for 3 months)    Hypothyroid March 2011    Total thyroidectomy for indeterminate nodule.  Proved benign.  Left carotid bruit Oct 2014    Discovered at routine exam. Carotid Doppler showed bilateral stenosis <50%    Obstructive sleep apnea 1997    as of 2019 still using CPAP    Peripheral neuropathy     2/2 DM.  Pneumonia 53/7602    complicated by hypotension and overdiuresis leading to REYNALDO. S.Cr 1.7 on 3/9/19. Improved to 1.3 on 3/11/19    Sick sinus syndrome (HCC) 02/16/2017    PIP placed because of 7 sec pauses on sotalol. Past Surgical History:   Procedure Laterality Date    CHOLECYSTECTOMY, OPEN  44    CORONARY ANGIOPLASTY  12/17/07    DIAGNOSTIC CARDIAC CATH LAB PROCEDURE  12/17/07    PACEMAKER INSERTION  02/2017    Sick sinus sundrome with 7 sec pauses on sotalol.     THYROIDECTOMY  2011    Benign    TOTAL KNEE ARTHROPLASTY Right 3/12/14     Social History     Socioeconomic History    Marital status: Single     Spouse name: Not on file    Number of children: Not on file    Years of education: Not on file    Highest education level: Not on file   Occupational History    Not on file   Social Needs    Financial resource strain: Not on file    Food insecurity:     Worry: Not on file     Inability: Not on file    Transportation needs:     Medical: Not on file     Non-medical: Not on file   Tobacco Use    Smoking status: Never Smoker    Smokeless tobacco: Never Used   Substance and Sexual Activity    Alcohol use: No    Drug use: No    Sexual activity: Not on file     Comment: Single   Lifestyle    Physical activity:     Days per week: Not on file     Minutes per session: Not on file    Stress: Not on file   Relationships    Social connections:     Talks on phone: Not on file     Gets together: Not on file     Attends Faith service: Not on file     Active member of club or organization: Not on file     Attends meetings of clubs or organizations: Not on file     Relationship status: Not on file    Intimate partner violence:     Fear of current or ex partner: Not on file

## 2019-09-06 NOTE — TELEPHONE ENCOUNTER
Requested Prescriptions     Pending Prescriptions Disp Refills    spironolactone (ALDACTONE) 25 MG tablet [Pharmacy Med Name: SPIRONOLACTONE 25 MG TABLET 25 TAB] 30 tablet 3     Sig: TAKE 1 TABLET BY MOUTH DAILY   /  Faisal Schaeffer Office Visit: 8/6/19    Next Office Visit: 9/18/19    Last Refill: 3/14/19    Last Labs: 4/11/19

## 2019-09-10 RX ORDER — SPIRONOLACTONE 25 MG/1
25 TABLET ORAL DAILY
Qty: 30 TABLET | Refills: 3 | Status: SHIPPED | OUTPATIENT
Start: 2019-09-10 | End: 2019-12-27

## 2019-09-18 ENCOUNTER — NURSE ONLY (OUTPATIENT)
Dept: CARDIOLOGY CLINIC | Age: 74
End: 2019-09-18
Payer: MEDICARE

## 2019-09-18 ENCOUNTER — OFFICE VISIT (OUTPATIENT)
Dept: CARDIOLOGY CLINIC | Age: 74
End: 2019-09-18
Payer: MEDICARE

## 2019-09-18 VITALS
SYSTOLIC BLOOD PRESSURE: 110 MMHG | WEIGHT: 188 LBS | BODY MASS INDEX: 28.49 KG/M2 | HEIGHT: 68 IN | DIASTOLIC BLOOD PRESSURE: 54 MMHG | HEART RATE: 65 BPM

## 2019-09-18 DIAGNOSIS — I48.0 PAROXYSMAL ATRIAL FIBRILLATION (HCC): Primary | ICD-10-CM

## 2019-09-18 DIAGNOSIS — I49.5 SICK SINUS SYNDROME (HCC): ICD-10-CM

## 2019-09-18 DIAGNOSIS — I50.32 CHRONIC DIASTOLIC HEART FAILURE (HCC): ICD-10-CM

## 2019-09-18 DIAGNOSIS — I48.0 PAROXYSMAL ATRIAL FIBRILLATION (HCC): ICD-10-CM

## 2019-09-18 DIAGNOSIS — Z95.0 PACEMAKER: ICD-10-CM

## 2019-09-18 DIAGNOSIS — I10 ESSENTIAL HYPERTENSION: ICD-10-CM

## 2019-09-18 DIAGNOSIS — I45.5 SINUS PAUSE: ICD-10-CM

## 2019-09-18 DIAGNOSIS — R00.1 BRADYCARDIA: ICD-10-CM

## 2019-09-18 DIAGNOSIS — Z95.0 CARDIAC PACEMAKER IN SITU: ICD-10-CM

## 2019-09-18 DIAGNOSIS — I35.0 MODERATE AORTIC STENOSIS: ICD-10-CM

## 2019-09-18 PROCEDURE — G8598 ASA/ANTIPLAT THER USED: HCPCS | Performed by: NURSE PRACTITIONER

## 2019-09-18 PROCEDURE — 1036F TOBACCO NON-USER: CPT | Performed by: NURSE PRACTITIONER

## 2019-09-18 PROCEDURE — 93280 PM DEVICE PROGR EVAL DUAL: CPT | Performed by: INTERNAL MEDICINE

## 2019-09-18 PROCEDURE — G8417 CALC BMI ABV UP PARAM F/U: HCPCS | Performed by: NURSE PRACTITIONER

## 2019-09-18 PROCEDURE — G8427 DOCREV CUR MEDS BY ELIG CLIN: HCPCS | Performed by: NURSE PRACTITIONER

## 2019-09-18 PROCEDURE — 99214 OFFICE O/P EST MOD 30 MIN: CPT | Performed by: NURSE PRACTITIONER

## 2019-09-18 PROCEDURE — 4040F PNEUMOC VAC/ADMIN/RCVD: CPT | Performed by: NURSE PRACTITIONER

## 2019-09-18 PROCEDURE — 1123F ACP DISCUSS/DSCN MKR DOCD: CPT | Performed by: NURSE PRACTITIONER

## 2019-09-18 PROCEDURE — 93000 ELECTROCARDIOGRAM COMPLETE: CPT | Performed by: NURSE PRACTITIONER

## 2019-09-18 NOTE — PROGRESS NOTES
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 Sidra Bearden,  symptomatic pAF, symptomatic post conversion pauses while on large dose of fito agents, d/c'd BB, started dilt and low dose sotalol with low AF burden. Noted to have greater than 7 second pause on outpatient ambulatory monitor. S/p dual-chamber Medtronic PPM on 2/15/2017 (Dr. Gopal Walsh). Today: Patient had been admitted into the hospital in March 2019 for PNA and CHF, noted to be in AF/RVR and sotalol was increased to 80 mg twice daily at that time. Patient has continued with paroxysmal atrial fibrillation since that time. His burden today per his device is around 14.6%. Patient is occasionally symptomatic with these episodes of atrial fibrillation. When he is symptomatic, he feels dizziness lightheadedness, heart racing and palpitations. There are other times when he checks his blood pressure and his heart rate, notices its irregular and cannot tell that he is in atrial fibrillation. He has not been able to associate with a trigger, nothing makes it better nothing makes it worse. He does wear a CPAP religiously for his BRAD. He continues to exercise 2 days a week with a  and notes that his blood pressure is low after he is done exercising. Is now down to working 1 day a week.     Home medications:   Current Outpatient Medications on File Prior to Visit   Medication Sig Dispense Refill    spironolactone (ALDACTONE) 25 MG tablet TAKE 1 TABLET BY MOUTH DAILY 30 tablet 3    sotalol (BETAPACE) 80 MG tablet Take 1 tablet by mouth 2 times daily 180 tablet 3    furosemide (LASIX) 20 MG tablet Take 3 tablets by mouth daily (Patient taking differently: Take 40 drink alcohol or use drugs. Family History:  Reviewed. family history includes Heart Attack in his maternal grandfather; Heart Defect in his maternal grandfather and mother; Heart Disease in his father; Heart Surgery in his father; Pacemaker in his mother. Review of System:    · Constitutional: No fevers, chills. · Eyes: No visual changes or diplopia. No scleral icterus. · ENT: No Headaches. No mouth sores or sore throat. · Cardiovascular: No for chest pain, No for dyspnea on exertion, No for palpitations or No for loss of consciousness. No cough, hemoptysis, No for pleuritic pain, or phlebitis. · Respiratory: No for cough or wheezing. No hematemesis. · Gastrointestinal: No abdominal pain, blood in stools. · Genitourinary: No dysuria, or hematuria. · Musculoskeletal: No gait disturbance,    · Integumentary: No rash or pruritis. · Neurological: No headache, change in muscle strength, numbness or tingling. · Psychiatric: No anxiety, or depression. · Endocrine: No temperature intolerance. No excessive thirst, fluid intake, or urination. · Hem/Lymph: No abnormal bruising or bleeding, blood clots or swollen lymph nodes. · Allergic/Immunologic: No nasal congestion or hives. Physical Examination:  Vitals:    09/18/19 1356   BP: (!) 110/54   Pulse: 65        Wt Readings from Last 3 Encounters:   09/18/19 188 lb (85.3 kg)   08/06/19 188 lb (85.3 kg)   07/02/19 186 lb 12.8 oz (84.7 kg)     · Constitutional: Oriented. No distress. · Head: Normocephalic and atraumatic. · Mouth/Throat: Oropharynx is clear and moist.   · Eyes: Conjunctivae normal. EOM are normal.   · Neck: Neck supple. No rigidity. No JVD present. · Cardiovascular: Normal rate, regular rhythm, S1&S2. sys m  · Pulmonary/Chest: Bilateral respiratory sounds. No wheezes, No rhonchi. · Abdominal: Soft. Bowel sounds present. No distension, No tenderness. · Musculoskeletal: No tenderness.  No edema    · Lymphadenopathy: Has no thickened/calcified.   Moderate to severe aortic stenosis with a peak velocity of 3.7m/s and a mean   pressure gradient of 32mmHg.   Mild aortic regurgitation.   Mild tricuspid regurgitation.   Estimated pulmonary artery systolic pressure is at 36 mmHg assuming a right   atrial pressure of 3 mmHg.   Bi-atrial enlargement. Compare to prior echo on (7/19/16).   Small pericardial effusion     Stress Test: 11/17/2016  Summary    Left ventricular ejection fraction of 64 %.    The LV wall motion is normal.    There is normal isotope uptake at stress and rest. There is no evidence of    myocardial ischemia or scar.    Low risk for major cardiac event. Cardiac Angiography: None available      Patient Active Problem List    Diagnosis Date Noted    Cardiac pacemaker in situ 02/15/2017     Priority: Low    Bradycardia      Priority: Low    Dizziness and giddiness      Priority: Low    Lightheaded      Priority: Low    Sick sinus syndrome (HCC)      Priority: Low    Paroxysmal atrial fibrillation (HCC)      Priority: Low    Syncope 11/04/2015     Priority: Low    Atrial fibrillation ([facility] [address] 10/04/2012     Priority: Low    Hyperlipidemia      Priority: Low    Old myocardial infarction      Priority: Low    Essential hypertension      Priority: Low    Coronary artery disease involving native coronary artery of native heart without angina pectoris      Priority: Low    Palpitations      Priority: Low    Shortness of breath      Priority: Low    History of coronary angioplasty      Priority: Low    Chronic diastolic (congestive) heart failure ([facility] [address] 03/28/2019    Nonrheumatic aortic valve stenosis 03/28/2019    Acute diastolic (congestive) heart failure ([facility] [address] 03/07/2019    Community acquired pneumonia 03/07/2019    Pneumonia 03/01/2019      Assessment:   1. Paroxysmal atrial fibrillation (HCC)    2. Sinus pause    3. Pacemaker    4. Chronic diastolic heart failure ([address]    5. Moderate aortic stenosis    6. errors may be present.      Thank you for allowing me to participate in the care of [address] [facility]

## 2019-09-19 NOTE — PROGRESS NOTES
Interrogation and programming evaluation of the device shows normal function, with stable sensing and pacing thresholds. See interrogation for details. The pt saw Jannie Alberto NP today. Recheck remotely 3 mos.

## 2019-12-03 ENCOUNTER — OFFICE VISIT (OUTPATIENT)
Dept: CARDIOLOGY CLINIC | Age: 74
End: 2019-12-03
Payer: MEDICARE

## 2019-12-03 VITALS
BODY MASS INDEX: 29.19 KG/M2 | DIASTOLIC BLOOD PRESSURE: 80 MMHG | WEIGHT: 192 LBS | HEART RATE: 68 BPM | SYSTOLIC BLOOD PRESSURE: 120 MMHG

## 2019-12-03 DIAGNOSIS — Z98.61 HISTORY OF PTCA: ICD-10-CM

## 2019-12-03 DIAGNOSIS — Z95.0 PACEMAKER: ICD-10-CM

## 2019-12-03 DIAGNOSIS — I50.32 CHRONIC DIASTOLIC CONGESTIVE HEART FAILURE (HCC): ICD-10-CM

## 2019-12-03 DIAGNOSIS — I48.0 PAROXYSMAL ATRIAL FIBRILLATION (HCC): ICD-10-CM

## 2019-12-03 DIAGNOSIS — I35.0 NONRHEUMATIC AORTIC VALVE STENOSIS: ICD-10-CM

## 2019-12-03 DIAGNOSIS — I25.10 CAD IN NATIVE ARTERY: Primary | ICD-10-CM

## 2019-12-03 DIAGNOSIS — I49.5 SSS (SICK SINUS SYNDROME) (HCC): ICD-10-CM

## 2019-12-03 PROCEDURE — 99214 OFFICE O/P EST MOD 30 MIN: CPT | Performed by: INTERNAL MEDICINE

## 2019-12-03 PROCEDURE — G8417 CALC BMI ABV UP PARAM F/U: HCPCS | Performed by: INTERNAL MEDICINE

## 2019-12-03 PROCEDURE — G8484 FLU IMMUNIZE NO ADMIN: HCPCS | Performed by: INTERNAL MEDICINE

## 2019-12-03 PROCEDURE — 1123F ACP DISCUSS/DSCN MKR DOCD: CPT | Performed by: INTERNAL MEDICINE

## 2019-12-03 PROCEDURE — 4040F PNEUMOC VAC/ADMIN/RCVD: CPT | Performed by: INTERNAL MEDICINE

## 2019-12-03 PROCEDURE — 1036F TOBACCO NON-USER: CPT | Performed by: INTERNAL MEDICINE

## 2019-12-03 PROCEDURE — G8427 DOCREV CUR MEDS BY ELIG CLIN: HCPCS | Performed by: INTERNAL MEDICINE

## 2019-12-03 PROCEDURE — G8598 ASA/ANTIPLAT THER USED: HCPCS | Performed by: INTERNAL MEDICINE

## 2019-12-03 ASSESSMENT — ENCOUNTER SYMPTOMS
ABDOMINAL DISTENTION: 0
APNEA: 0
CHOKING: 0
SHORTNESS OF BREATH: 0
CHEST TIGHTNESS: 0
COUGH: 0

## 2019-12-17 ENCOUNTER — NURSE ONLY (OUTPATIENT)
Dept: CARDIOLOGY CLINIC | Age: 74
End: 2019-12-17
Payer: MEDICARE

## 2019-12-17 DIAGNOSIS — R00.1 BRADYCARDIA: ICD-10-CM

## 2019-12-17 DIAGNOSIS — Z95.0 PACEMAKER: Primary | ICD-10-CM

## 2019-12-17 PROCEDURE — 93296 REM INTERROG EVL PM/IDS: CPT | Performed by: INTERNAL MEDICINE

## 2019-12-17 PROCEDURE — 93294 REM INTERROG EVL PM/LDLS PM: CPT | Performed by: INTERNAL MEDICINE

## 2019-12-26 RX ORDER — MINOXIDIL 10 MG/1
TABLET ORAL
Qty: 60 TABLET | Refills: 11 | Status: SHIPPED | OUTPATIENT
Start: 2019-12-26 | End: 2021-01-04

## 2019-12-27 RX ORDER — SPIRONOLACTONE 25 MG/1
25 TABLET ORAL DAILY
Qty: 30 TABLET | Refills: 3 | Status: SHIPPED | OUTPATIENT
Start: 2019-12-27 | End: 2020-05-05

## 2020-01-21 NOTE — PROGRESS NOTES
for Anxiety        No facility-administered encounter medications on file as of 1/23/2020. Allergies:  Erythromycin     Review of Systems   Constitutional: Negative. HENT: Negative. Eyes: Negative. Respiratory: Negative. Cardiovascular: Negative. Gastrointestinal: Negative. Genitourinary: Negative. Musculoskeletal: Negative. Skin: Negative. Neurological: Negative. Hematological: Negative. Psychiatric/Behavioral: Negative. BP (!) 108/58 (Site: Left Upper Arm, Position: Supine, Cuff Size: Medium Adult)   Pulse 68   Ht 5' 8\" (1.727 m)   Wt 192 lb 12.8 oz (87.5 kg)   BMI 29.32 kg/m²     EKG 1/23/20 Personally reviewed. Objective:  Physical Exam   Constitutional: He is oriented to person, place, and time. He appears well-developed and well-nourished. HENT:   Head: Normocephalic and atraumatic. Eyes: Pupils are equal, round, and reactive to light. Neck: Normal range of motion. Cardiovascular: Normal rate, irregular rhythm and  2/6SEM    Pulmonary/Chest: Effort normal and breath sounds normal.   Abdominal: Soft. No tenderness. Musculoskeletal: Normal range of motion. He exhibits no edema. Neurological: He is alert and oriented to person, place, and time. Skin: Skin is warm and dry. Psychiatric: He has a normal mood and affect. Assessment:  1. PAF/SSS- s/p Medtronic DDD pacemaker 2017. He is taking Cartia 120mg QD & Sotalol 80 mg BID. He is also on warfarin for stroke prevention. Dr Rosa Rothman office manages his INR. He shows no S/S of bleeding. Device today shows AF at 13.2%. Discussed PVI as a treatment option as he has paroxysmal AF which is symptomatic and has not responded to pharmacologic therapy. 2. Thyroid nodule-s/p total thyroidectomy  3. BRAD- on nasal CPAP    Plan:  1. Continue medications including sotalol 80 mg daily. 2. F/U with  me in 6 month with device check  3. Continue activity as tolerated.    4. He will consider AF ablation but wishes to hold off for now. Jennifer Choe RN, am scribing for and in the presence of Dr. Kee Weems. 01/23/20   9:12 AM  Uriel Brito RN    I, Dr. Kee Weems, personally performed the services described in this documentation as scribed by Uriel Brito RN  in my presence, and it is both accurate and complete.     Kee Weems M.D.

## 2020-01-23 ENCOUNTER — NURSE ONLY (OUTPATIENT)
Dept: CARDIOLOGY CLINIC | Age: 75
End: 2020-01-23
Payer: MEDICARE

## 2020-01-23 ENCOUNTER — OFFICE VISIT (OUTPATIENT)
Dept: CARDIOLOGY CLINIC | Age: 75
End: 2020-01-23
Payer: MEDICARE

## 2020-01-23 VITALS
HEIGHT: 68 IN | WEIGHT: 192.8 LBS | SYSTOLIC BLOOD PRESSURE: 108 MMHG | BODY MASS INDEX: 29.22 KG/M2 | HEART RATE: 68 BPM | DIASTOLIC BLOOD PRESSURE: 58 MMHG

## 2020-01-23 PROCEDURE — 1123F ACP DISCUSS/DSCN MKR DOCD: CPT | Performed by: INTERNAL MEDICINE

## 2020-01-23 PROCEDURE — 93280 PM DEVICE PROGR EVAL DUAL: CPT | Performed by: INTERNAL MEDICINE

## 2020-01-23 PROCEDURE — G8484 FLU IMMUNIZE NO ADMIN: HCPCS | Performed by: INTERNAL MEDICINE

## 2020-01-23 PROCEDURE — 1036F TOBACCO NON-USER: CPT | Performed by: INTERNAL MEDICINE

## 2020-01-23 PROCEDURE — 93000 ELECTROCARDIOGRAM COMPLETE: CPT | Performed by: INTERNAL MEDICINE

## 2020-01-23 PROCEDURE — 99214 OFFICE O/P EST MOD 30 MIN: CPT | Performed by: INTERNAL MEDICINE

## 2020-01-23 PROCEDURE — G8428 CUR MEDS NOT DOCUMENT: HCPCS | Performed by: INTERNAL MEDICINE

## 2020-01-23 PROCEDURE — 4040F PNEUMOC VAC/ADMIN/RCVD: CPT | Performed by: INTERNAL MEDICINE

## 2020-01-23 PROCEDURE — G8417 CALC BMI ABV UP PARAM F/U: HCPCS | Performed by: INTERNAL MEDICINE

## 2020-02-10 RX ORDER — DILTIAZEM HYDROCHLORIDE 120 MG/1
CAPSULE, COATED, EXTENDED RELEASE ORAL
Qty: 60 CAPSULE | Refills: 5 | Status: SHIPPED | OUTPATIENT
Start: 2020-02-10 | End: 2021-02-19

## 2020-03-11 ENCOUNTER — OFFICE VISIT (OUTPATIENT)
Dept: CARDIOLOGY CLINIC | Age: 75
End: 2020-03-11
Payer: MEDICARE

## 2020-03-11 VITALS
WEIGHT: 194 LBS | HEART RATE: 80 BPM | SYSTOLIC BLOOD PRESSURE: 110 MMHG | DIASTOLIC BLOOD PRESSURE: 70 MMHG | BODY MASS INDEX: 29.5 KG/M2

## 2020-03-11 PROCEDURE — 4040F PNEUMOC VAC/ADMIN/RCVD: CPT | Performed by: INTERNAL MEDICINE

## 2020-03-11 PROCEDURE — G8427 DOCREV CUR MEDS BY ELIG CLIN: HCPCS | Performed by: INTERNAL MEDICINE

## 2020-03-11 PROCEDURE — G8484 FLU IMMUNIZE NO ADMIN: HCPCS | Performed by: INTERNAL MEDICINE

## 2020-03-11 PROCEDURE — 1036F TOBACCO NON-USER: CPT | Performed by: INTERNAL MEDICINE

## 2020-03-11 PROCEDURE — 1123F ACP DISCUSS/DSCN MKR DOCD: CPT | Performed by: INTERNAL MEDICINE

## 2020-03-11 PROCEDURE — 99214 OFFICE O/P EST MOD 30 MIN: CPT | Performed by: INTERNAL MEDICINE

## 2020-03-11 PROCEDURE — G8417 CALC BMI ABV UP PARAM F/U: HCPCS | Performed by: INTERNAL MEDICINE

## 2020-03-11 ASSESSMENT — ENCOUNTER SYMPTOMS
CHOKING: 0
APNEA: 0
CHEST TIGHTNESS: 0
COUGH: 0
ABDOMINAL DISTENTION: 0
SHORTNESS OF BREATH: 0

## 2020-03-11 NOTE — PROGRESS NOTES
Subjective:      Patient ID: Ernestina Hanson is a 66 y.o. male. Coronary Artery Disease   Pertinent negatives include no chest pain, chest tightness, dizziness, leg swelling, palpitations or shortness of breath. His past medical history is significant for CHF. Hypertension   Pertinent negatives include no chest pain, palpitations or shortness of breath. Shortness of Breath   Pertinent negatives include no chest pain or leg swelling. His past medical history is significant for CAD. Congestive Heart Failure   Pertinent negatives include no chest pain, fatigue, palpitations or shortness of breath. His past medical history is significant for CAD. Here for follow up afib/sss/pacer/htn/cad/CHF. Feeling good. No pnd or orthopne. No edema. Wt down. BP good. No chest pain. No sob. Intermittent afib. Back to exercising. No syncope. Past Medical History:   Diagnosis Date    Aortic stenosis 2015 Feb 2019 mean gradient 35mg Hg    Atrial fibrillation ([facility] [address] 07/2012    Saw cardiologist for palps. Event recorder showed AF about 5% of the time. Warfarin started. Became persistent in March 2019 when admitted with pneumonia.  CAD (coronary artery disease), native coronary artery Dec 18, 2007    Presented with palpitation to ER. Slight inc. in S. troponin. Cor. angio showed 90% L. circ. Stent placed. (Dr Escalante Every.  Diabetes mellitus ([facility] [address] 1992    HbA1C in Jennifer '15 was 6.9 on metformin and glipizide.  Diastolic congestive heart failure ([facility] [address] 08/2006    presented with wheeze, [id number]. cardiac cath to r/o ischemia. Diffuse CAD no critical stenoses. LVEF 55% LVEDP 31.    Essential hypertension since 1980    Difficult to control with need for minoxidil in addition to ARB,CCB,BB and diuretic.  Gout     Podagra intermittently over the years. Uric acid 9.4 in 2011. Allopurinol started (with colchicine for 3 months)    Hypothyroid March 2011    Total thyroidectomy for indeterminate nodule.  Proved benign.  Left carotid bruit Oct 2014    Discovered at routine exam. Carotid Doppler showed bilateral stenosis <50%    Obstructive sleep apnea 1997    as of 2019 still using CPAP    Peripheral neuropathy     2/2 DM.  Pneumonia 48/9500    complicated by hypotension and overdiuresis leading to REYNALDO. S.Cr 1.7 on 3/9/19. Improved to 1.3 on 3/11/19    Sick sinus syndrome (HCC) 02/16/2017    PIP placed because of 7 sec pauses on sotalol. Past Surgical History:   Procedure Laterality Date    CHOLECYSTECTOMY, OPEN  44    CORONARY ANGIOPLASTY  12/17/07    DIAGNOSTIC CARDIAC CATH LAB PROCEDURE  12/17/07    PACEMAKER INSERTION  02/2017    Sick sinus sundrome with 7 sec pauses on sotalol.     THYROIDECTOMY  2011    Benign    TOTAL KNEE ARTHROPLASTY Right 3/12/14     Social History     Socioeconomic History    Marital status: Single     Spouse name: Not on file    Number of children: Not on file    Years of education: Not on file    Highest education level: Not on file   Occupational History    Not on file   Social Needs    Financial resource strain: Not on file    Food insecurity     Worry: Not on file     Inability: Not on file    Transportation needs     Medical: Not on file     Non-medical: Not on file   Tobacco Use    Smoking status: Never Smoker    Smokeless tobacco: Never Used   Substance and Sexual Activity    Alcohol use: No    Drug use: No    Sexual activity: Not on file     Comment: Single   Lifestyle    Physical activity     Days per week: Not on file     Minutes per session: Not on file    Stress: Not on file   Relationships    Social connections     Talks on phone: Not on file     Gets together: Not on file     Attends Congregation service: Not on file     Active member of club or organization: Not on file     Attends meetings of clubs or organizations: Not on file     Relationship status: Not on file    Intimate partner violence     Fear of current or ex partner: Not on file Emotionally abused: Not on file     Physically abused: Not on file     Forced sexual activity: Not on file   Other Topics Concern    Not on file   Social History Narrative    Not on file       FH reviewed, denies FH cardiac issues. Vitals:    03/11/20 1355   BP: 110/70   Pulse: 80     Wt 194      Review of Systems   Constitutional: Negative for activity change and fatigue. Respiratory: Negative for apnea, cough, choking, chest tightness and shortness of breath. Cardiovascular: Negative for chest pain, palpitations and leg swelling. No PND or orthopnea. No tachycardia. Gastrointestinal: Negative for abdominal distention. Musculoskeletal: Negative for myalgias. Neurological: Negative for dizziness, syncope and light-headedness. Psychiatric/Behavioral: Negative for agitation, behavioral problems and confusion. All other systems reviewed negative as done. Objective:   Physical Exam   Constitutional: He is oriented to person, place, and time. He appears well-developed and well-nourished. No distress. HENT:   Head: Normocephalic and atraumatic. Eyes: Conjunctivae and EOM are normal. Right eye exhibits no discharge. Left eye exhibits no discharge. Neck: Normal range of motion. Neck supple. No JVD present. Cardiovascular: Normal rate, regular rhythm, S1 normal and S2 normal. Exam reveals no gallop. Murmur heard. Pulses:       Radial pulses are 2+ on the right side and 2+ on the left side. 2/6 syst M   Pulmonary/Chest: Effort normal and breath sounds normal. He has no wheezes. He has no rales. Abdominal: Soft. Bowel sounds are normal. There is no abdominal tenderness. Musculoskeletal: Normal range of motion. General: No edema. Neurological: He is alert and oriented to person, place, and time. Skin: Skin is warm and dry. Psychiatric: He has a normal mood and affect. His behavior is normal. Thought content normal.       Assessment:       Diagnosis Orders   1.

## 2020-03-17 RX ORDER — FUROSEMIDE 20 MG/1
60 TABLET ORAL DAILY
Qty: 270 TABLET | Refills: 3 | Status: SHIPPED | OUTPATIENT
Start: 2020-03-17 | End: 2021-04-02

## 2020-04-08 ENCOUNTER — TELEPHONE (OUTPATIENT)
Dept: CARDIOLOGY CLINIC | Age: 75
End: 2020-04-08

## 2020-04-10 RX ORDER — SOTALOL HYDROCHLORIDE 80 MG/1
80 TABLET ORAL 2 TIMES DAILY
Qty: 180 TABLET | Refills: 3 | Status: SHIPPED | OUTPATIENT
Start: 2020-04-10 | End: 2021-04-02

## 2020-04-10 NOTE — PROGRESS NOTES
Carelink transmission shows normal sensing and pacing function. See interrogation for more details. Hx pAF/AT (oac,  cardizem cd, betapace). AT/AF burden 15.9%. Longest 32 hrs and last recorded 4/6/20 x 3 hrs.  5.5%. Follow up in 3 months via carelink.

## 2020-04-13 ENCOUNTER — NURSE ONLY (OUTPATIENT)
Dept: CARDIOLOGY CLINIC | Age: 75
End: 2020-04-13
Payer: MEDICARE

## 2020-04-13 PROCEDURE — 93296 REM INTERROG EVL PM/IDS: CPT | Performed by: INTERNAL MEDICINE

## 2020-04-13 PROCEDURE — 93294 REM INTERROG EVL PM/LDLS PM: CPT | Performed by: INTERNAL MEDICINE

## 2020-05-05 RX ORDER — SPIRONOLACTONE 25 MG/1
25 TABLET ORAL DAILY
Qty: 30 TABLET | Refills: 3 | Status: SHIPPED | OUTPATIENT
Start: 2020-05-05 | End: 2020-09-08

## 2020-05-15 ENCOUNTER — HOSPITAL ENCOUNTER (OUTPATIENT)
Dept: NON INVASIVE DIAGNOSTICS | Age: 75
Discharge: HOME | End: 2020-05-15
Payer: MEDICARE

## 2020-05-15 LAB
LV EF: 58 %
LVEF MODALITY: NORMAL

## 2020-05-15 PROCEDURE — 93306 TTE W/DOPPLER COMPLETE: CPT

## 2020-06-30 ENCOUNTER — OFFICE VISIT (OUTPATIENT)
Dept: CARDIOLOGY CLINIC | Age: 75
End: 2020-06-30
Payer: MEDICARE

## 2020-06-30 VITALS
BODY MASS INDEX: 29.86 KG/M2 | DIASTOLIC BLOOD PRESSURE: 74 MMHG | HEIGHT: 68 IN | WEIGHT: 197 LBS | HEART RATE: 64 BPM | SYSTOLIC BLOOD PRESSURE: 134 MMHG | TEMPERATURE: 98 F

## 2020-06-30 PROCEDURE — 99214 OFFICE O/P EST MOD 30 MIN: CPT | Performed by: INTERNAL MEDICINE

## 2020-06-30 PROCEDURE — 1123F ACP DISCUSS/DSCN MKR DOCD: CPT | Performed by: INTERNAL MEDICINE

## 2020-06-30 PROCEDURE — G8417 CALC BMI ABV UP PARAM F/U: HCPCS | Performed by: INTERNAL MEDICINE

## 2020-06-30 PROCEDURE — 1036F TOBACCO NON-USER: CPT | Performed by: INTERNAL MEDICINE

## 2020-06-30 PROCEDURE — 4040F PNEUMOC VAC/ADMIN/RCVD: CPT | Performed by: INTERNAL MEDICINE

## 2020-06-30 PROCEDURE — G8427 DOCREV CUR MEDS BY ELIG CLIN: HCPCS | Performed by: INTERNAL MEDICINE

## 2020-06-30 ASSESSMENT — ENCOUNTER SYMPTOMS
COUGH: 0
ABDOMINAL DISTENTION: 0
APNEA: 0
CHEST TIGHTNESS: 0
SHORTNESS OF BREATH: 0
CHOKING: 0

## 2020-06-30 NOTE — PROGRESS NOTES
Subjective:      Patient ID: Ely Moulton is a 66 y.o. male. Coronary Artery Disease   Pertinent negatives include no chest pain, chest tightness, dizziness, leg swelling, palpitations or shortness of breath. His past medical history is significant for CHF. Hypertension   Pertinent negatives include no chest pain, palpitations or shortness of breath. Shortness of Breath   Pertinent negatives include no chest pain or leg swelling. His past medical history is significant for CAD. Congestive Heart Failure   Pertinent negatives include no chest pain, fatigue, palpitations or shortness of breath. His past medical history is significant for CAD. Here for follow up afib/sss/pacer/htn/cad/CHF. Feeling good. Trying to get back on treadmill over past 2 months. Occ pressure in chest.  Mild. Sometimes can walking. No pnd or orthopne. No edema. Wt down. BP good. No chest pain. No sob. Intermittent afib. No syncope. Past Medical History:   Diagnosis Date    Aortic stenosis 2015 Feb 2019 mean gradient 35mg Hg    Atrial fibrillation ([facility] [address] 07/2012    Saw cardiologist for palps. Event recorder showed AF about 5% of the time. Warfarin started. Became persistent in March 2019 when admitted with pneumonia.  CAD (coronary artery disease), native coronary artery Dec 18, 2007    Presented with palpitation to ER. Slight inc. in S. troponin. Cor. angio showed 90% L. circ. Stent placed. (Dr Suad aCban.  Diabetes mellitus ([facility] [address] 1992    HbA1C in Jennifer '15 was 6.9 on metformin and glipizide.  Diastolic congestive heart failure ([facility] [address] 08/2006    presented with wheeze, [id number]. cardiac cath to r/o ischemia. Diffuse CAD no critical stenoses. LVEF 55% LVEDP 31.    Essential hypertension since 1980    Difficult to control with need for minoxidil in addition to ARB,CCB,BB and diuretic.  Gout     Podagra intermittently over the years. Uric acid 9.4 in 2011.  Allopurinol started (with colchicine for 3 months)  Intimate partner violence     Fear of current or ex partner: Not on file     Emotionally abused: Not on file     Physically abused: Not on file     Forced sexual activity: Not on file   Other Topics Concern    Not on file   Social History Narrative    Not on file       FH reviewed, denies FH cardiac issues. Vitals:    06/30/20 1405   BP: 134/74   Pulse: 64   Temp: 98 °F (36.7 °C)       Wt 197      Review of Systems   Constitutional: Negative for activity change and fatigue. Respiratory: Negative for apnea, cough, choking, chest tightness and shortness of breath. Cardiovascular: Negative for chest pain, palpitations and leg swelling. No PND or orthopnea. No tachycardia. Gastrointestinal: Negative for abdominal distention. Musculoskeletal: Negative for myalgias. Neurological: Negative for dizziness, syncope and light-headedness. Psychiatric/Behavioral: Negative for agitation, behavioral problems and confusion. All other systems reviewed negative as done. Objective:   Physical Exam   Constitutional: He is oriented to person, place, and time. He appears well-developed and well-nourished. No distress. HENT:   Head: Normocephalic and atraumatic. Eyes: Conjunctivae and EOM are normal. Right eye exhibits no discharge. Left eye exhibits no discharge. Neck: Normal range of motion. Neck supple. No JVD present. Cardiovascular: Normal rate, regular rhythm, S1 normal and S2 normal. Exam reveals no gallop. Murmur heard. Pulses:       Radial pulses are 2+ on the right side and 2+ on the left side. 2/6 syst M   Pulmonary/Chest: Effort normal and breath sounds normal. He has no wheezes. He has no rales. Abdominal: Soft. Bowel sounds are normal. There is no abdominal tenderness. Musculoskeletal: Normal range of motion. General: No edema. Neurological: He is alert and oriented to person, place, and time. Skin: Skin is warm and dry.    Psychiatric: He has a normal mood and affect. His behavior is normal. Thought content normal.       Assessment:       Diagnosis Orders   1. CAD in native artery     2. History of PTCA     3. Chronic diastolic congestive heart failure ([address]     4. SSS (sick sinus syndrome) ([address]     5. Pacemaker     6. Nonrheumatic aortic valve stenosis     7. Paroxysmal atrial fibrillation (HCC)             Plan:      CV stable. Rhythm stable. BP very good. EP following pacemaker/afib. Rhythm stable. Exercising with  2x per wk. Wt down. On AC, no bleeding issues. Recommended diet, exercise and wt loss. Reviewed previous records and testing including myoview 11/16 and echo 5/20. No changes. Continue to monitor. Follow up 3 months.  Marleni Rivers

## 2020-07-08 ENCOUNTER — HOSPITAL ENCOUNTER (OUTPATIENT)
Dept: NON INVASIVE DIAGNOSTICS | Age: 75
Discharge: HOME | End: 2020-07-08
Payer: MEDICARE

## 2020-07-08 LAB
LV EF: 73 %
LVEF MODALITY: NORMAL

## 2020-07-08 PROCEDURE — 3430000000 HC RX DIAGNOSTIC RADIOPHARMACEUTICAL: Performed by: INTERNAL MEDICINE

## 2020-07-08 PROCEDURE — 78452 HT MUSCLE IMAGE SPECT MULT: CPT

## 2020-07-08 PROCEDURE — A9502 TC99M TETROFOSMIN: HCPCS | Performed by: INTERNAL MEDICINE

## 2020-07-08 PROCEDURE — 93017 CV STRESS TEST TRACING ONLY: CPT

## 2020-07-08 PROCEDURE — 2580000003 HC RX 258: Performed by: INTERNAL MEDICINE

## 2020-07-08 PROCEDURE — 6360000002 HC RX W HCPCS: Performed by: INTERNAL MEDICINE

## 2020-07-08 RX ORDER — SODIUM CHLORIDE 0.9 % (FLUSH) 0.9 %
10 SYRINGE (ML) INJECTION PRN
Status: COMPLETED | OUTPATIENT
Start: 2020-07-08 | End: 2020-07-08

## 2020-07-08 RX ADMIN — Medication 10 ML: at 08:21

## 2020-07-08 RX ADMIN — TETROFOSMIN 11 MILLICURIE: 1.38 INJECTION, POWDER, LYOPHILIZED, FOR SOLUTION INTRAVENOUS at 08:21

## 2020-07-08 RX ADMIN — REGADENOSON 0.4 MG: 0.08 INJECTION, SOLUTION INTRAVENOUS at 10:07

## 2020-07-08 RX ADMIN — Medication 10 ML: at 10:06

## 2020-07-08 RX ADMIN — TETROFOSMIN 35 MILLICURIE: 1.38 INJECTION, POWDER, LYOPHILIZED, FOR SOLUTION INTRAVENOUS at 10:06

## 2020-07-14 ENCOUNTER — NURSE ONLY (OUTPATIENT)
Dept: CARDIOLOGY CLINIC | Age: 75
End: 2020-07-14
Payer: MEDICARE

## 2020-07-14 PROCEDURE — 93294 REM INTERROG EVL PM/LDLS PM: CPT | Performed by: INTERNAL MEDICINE

## 2020-07-14 PROCEDURE — 93296 REM INTERROG EVL PM/IDS: CPT | Performed by: INTERNAL MEDICINE

## 2020-07-15 NOTE — PROGRESS NOTES
Carelink transmission shows normal sensing and pacing function. See interrogation for more details. Hx pAF (oac, betapace, cardizem cd). Time in AT/AF 3.6 hr/day (14.9%). longest 35 hrs. And last recorded on 7/11/20. Pacing (% of Time Since 10-Apr-2020)  Total  5.8% (MVP On)  AP 58%. LUCIANA @ 5 yrs.   Ov keisha 7/30

## 2020-07-27 NOTE — PROGRESS NOTES
[address]   Cardiac Consultation    Referring Provider:  Adrienne Barrios MD     Chief Concerns: PAF, SSS s/p Medtronic dual chamber pacemaker (2/15/17 by Dr Ion Singh)    HPI:  Richard Ware is a 66 y.o. male . Patient has hx of CAD s/p PCI x 1 (2007) by Dr Gilma Lund, mild AS, BRAD on nasal CPAP, NIDDM, hypothyroidism s/p total thyroidectomy, HTN, gout, PAF & SSS, S/p dual-chamber Medtronic PPM on 2/15/2017 (Dr. Ion Singh). He has  mild AS, normal LVEF, CAD (PCI in 2007 x 1), symptomatic AF and what appeared to be symptomatic post conversion pauses while on large dose of fito agents. D/c'd BB, started dilt and low dose sotalol with low AF burden. Noted to have greater than 7 second pause on outpatient ambulatory monitor. Negative stress test in 2016. His sotalol was increased to 80mg BID in March in the hospital when he was admitted for CHF. ECHO 5/15/20 LVEF 55-60%. The patient is here for a 6 month follow up of arrhythmias and pacing system. Device check on 7/30/20 shows 54% AP, 5% , 14% AF, other parameters stable with 7 years of battery life left. He states that he can occasionally feel his AF and that last week he had one that was very noticeable. His exercise tolerance is noticeably impaired by the AF. Patient otherwise denies complaints of CP, SOB, dizziness,  orthopnea, or presycope/syncope. Past Medical History:  Social History:   reports that he has never smoked. He has never used smokeless tobacco. He reports that he does not drink alcohol or use drugs. Family History:  family history includes Heart Attack in his maternal grandfather; Heart Defect in his maternal grandfather and mother; Heart Disease in his father; Heart Surgery in his father; Pacemaker in his mother.      Home Medications:  Outpatient Encounter Medications as of 7/30/2020   Medication Sig Dispense Refill    spironolactone (ALDACTONE) 25 MG tablet TAKE 1 TABLET BY MOUTH DAILY 30 tablet 3    sotalol (BETAPACE) 80 MG tablet Take 1 tablet by mouth 2 times daily 180 tablet 3    furosemide (LASIX) 20 MG tablet Take 3 tablets by mouth daily 270 tablet 3    diltiazem (CARDIZEM CD) 120 MG extended release capsule TAKE 2 CAPSULES BY MOUTH DAILY 60 capsule 5    minoxidil (LONITEN) 10 MG tablet TAKE 1/2 TABLET BY MOUTH 2 TIMES A DAY 60 tablet 11    warfarin (COUMADIN) 7.5 MG tablet PATIENT TAKES 5MG ON MON, WED, AND FRI. THEN 7.5MG ON THE OTHER FOUR DAYS. (Patient taking differently: Take 7.5 mg by mouth Indications: Pt reports takes 7.5 mg on Fridays, and 5 mg all other days Patient takes 5mg on Mon, Wed, and Fri. Then 7.5mg on the other four days. ) 48 tablet 3    warfarin (COUMADIN) 5 MG tablet TAKE AS LA VARYING SCHEDULE 30 tablet 5    cetirizine (ZYRTEC) 10 MG tablet Take 10 mg by mouth daily      oxybutynin (DITROPAN) 5 MG/5ML syrup Take 5 mg by mouth as needed       glipiZIDE (GLUCOTROL) 5 MG tablet Take 5 mg by mouth 2 times daily       tamsulosin (FLOMAX) 0.4 MG capsule Take 0.4 mg by mouth nightly      traZODone (DESYREL) 100 MG tablet Take 100 mg by mouth nightly      atorvastatin (LIPITOR) 40 MG tablet Take 40 mg by mouth nightly       aspirin 81 MG tablet Take 81 mg by mouth daily.  diclofenac sodium (VOLTAREN) 1 % GEL Apply 2 g topically 2 times daily as needed       levothyroxine (SYNTHROID) 150 MCG tablet Take 150 mcg by mouth daily.  allopurinol (ZYLOPRIM) 300 MG tablet Take 300 mg by mouth nightly       gemfibrozil (LOPID) 600 MG tablet Take 600 mg by mouth 2 times daily (before meals).  metformin (GLUCOPHAGE) 1000 MG tablet Take 1,000 mg by mouth 2 times daily (with meals).  Multiple Vitamin (MULTIVITAMIN PO) Take 1 tablet by mouth daily       alprazolam (XANAX) 0.25 MG tablet Take 0.25 mg by mouth daily as needed for Anxiety        No facility-administered encounter medications on file as of 7/30/2020.         Allergies:  Erythromycin     Review of Systems Constitutional: Negative. HENT: Negative. Eyes: Negative. Respiratory: Negative. Cardiovascular: Negative. Gastrointestinal: Negative. Genitourinary: Negative. Musculoskeletal: Negative. Skin: Negative. Neurological: Negative. Hematological: Negative. Psychiatric/Behavioral: Negative. BP (!) 112/58 (Site: Right Upper Arm, Position: Supine, Cuff Size: Medium Adult)   Pulse 62   Temp 97.3 °F (36.3 °C)   Ht 5' 8\" (1.727 m)   Wt 197 lb 3.2 oz (89.4 kg)   BMI 29.98 kg/m²     EKG 7/30/20 Personally reviewed. 7/8/20 Stress test  Summary     There is normal isotope uptake at stress and rest. There is no evidence of     myocardial ischemia or scar.     Normal LV size and systolic function.     Left ventricular ejection fraction of 73 %.     There are no regional wall motion abnormalities.     Overall findings represent a low risk scan. Objective:  Physical Exam   Constitutional: He is oriented to person, place, and time. He appears well-developed and well-nourished. HENT:   Head: Normocephalic and atraumatic. Eyes: Pupils are equal, round, and reactive to light. Neck: Normal range of motion. Cardiovascular: Normal rate, regular rhythm and  2/6SEM    Pulmonary/Chest: Effort normal and breath sounds normal.   Abdominal: Soft. No tenderness. Musculoskeletal: Normal range of motion. He exhibits no edema. Neurological: He is alert and oriented to person, place, and time. Skin: Skin is warm and dry. Psychiatric: He has a normal mood and affect. 5/15/20 ECHO   Summary   Left ventricular cavity size is normal. There is mild concentric left   ventricular hypertrophy. Overall left ventricular systolic function appears normal with an ejection   fraction of 55-60%. No regional wall motion abnormalities are noted. Diastolic filling parameters suggest grade II diastolic dysfunction. Mitral annular calcification is present.    The aortic valve is thickened/calcified. Moderate aortic stenosis with a peak velocity of 3.31m/s and a mean pressure   gradient of 27mmHg. Mild tricuspid regurgitation. Estimated pulmonary artery systolic pressure is at 34 mmHg assuming a right   atrial pressure of 3 mmHg. The left atrium is mildly dilated. Assessment:  1. PAF/SSS- s/p Medtronic DDD pacemaker 2017. He is taking Cartia 120mg QD & Sotalol 80 mg BID. He is also on warfarin for stroke prevention. Dr Gun[facility] office manages his INR. He shows no S/S of bleeding. Device today shows AF at 14%. Discussed PVI as a treatment option as he has paroxysmal AF which is symptomatic and has not responded to pharmacologic therapy. 2. Thyroid nodule-s/p total thyroidectomy  3. BRAD- on nasal CPAP  4. CAD-     Plan:  1. Continue medications including sotalol 80 mg daily. 2. F/U with  me in 6 month with device check  3. Continue with low to moderate activity. 4. He will consider AF ablation but wishes to hold off for now. Candido Yanez RN, am scribing for and in the presence of Dr. Alex Cam. 07/30/20   10:18 AM  Venkata Wayne RN    I, Dr. Alex Cam, personally performed the services described in this documentation as scribed by Venkata Wayne RN  in my presence, and it is both accurate and complete.     Alex Cam M.D.

## 2020-07-30 ENCOUNTER — OFFICE VISIT (OUTPATIENT)
Dept: CARDIOLOGY CLINIC | Age: 75
End: 2020-07-30
Payer: MEDICARE

## 2020-07-30 ENCOUNTER — NURSE ONLY (OUTPATIENT)
Dept: CARDIOLOGY CLINIC | Age: 75
End: 2020-07-30
Payer: MEDICARE

## 2020-07-30 VITALS
HEART RATE: 62 BPM | BODY MASS INDEX: 29.89 KG/M2 | WEIGHT: 197.2 LBS | SYSTOLIC BLOOD PRESSURE: 112 MMHG | DIASTOLIC BLOOD PRESSURE: 58 MMHG | TEMPERATURE: 97.3 F | HEIGHT: 68 IN

## 2020-07-30 PROCEDURE — 1123F ACP DISCUSS/DSCN MKR DOCD: CPT | Performed by: INTERNAL MEDICINE

## 2020-07-30 PROCEDURE — G8427 DOCREV CUR MEDS BY ELIG CLIN: HCPCS | Performed by: INTERNAL MEDICINE

## 2020-07-30 PROCEDURE — 4040F PNEUMOC VAC/ADMIN/RCVD: CPT | Performed by: INTERNAL MEDICINE

## 2020-07-30 PROCEDURE — 93280 PM DEVICE PROGR EVAL DUAL: CPT | Performed by: INTERNAL MEDICINE

## 2020-07-30 PROCEDURE — 1036F TOBACCO NON-USER: CPT | Performed by: INTERNAL MEDICINE

## 2020-07-30 PROCEDURE — G8417 CALC BMI ABV UP PARAM F/U: HCPCS | Performed by: INTERNAL MEDICINE

## 2020-07-30 PROCEDURE — 99214 OFFICE O/P EST MOD 30 MIN: CPT | Performed by: INTERNAL MEDICINE

## 2020-07-30 NOTE — PROGRESS NOTES
Pt seen in clinic today for cardiac device interrogation. Their device is a MDT 2 chamber ppm      Based on threshold, impedance, and intrinsic sensing tests run today, the device appears to be functioning normally. However, pt's RV lead has been on a gradual threshold increase since implant to it's current threshold of Geo@PlaceIQ. 4ms  Remaining battery life is 7y4m  AP 52.67%                  5.35%      New onset episodes:    Other episodes: 12 episodes AT/AF since last remote check longest 14:01:29 on 07/26/2020    Rx: warfarin (COUMADIN) 7.5 MG tablet PATIENT TAKES 5MG ON MON, WED, AND FRI. THEN 7.5MG ON THE OTHER FOUR DAYS. Patient taking differently: Take 7.5 mg by mouth Indications: Pt reports takes 7.5 mg on Fridays, and 5 mg all other days Patient takes 5mg on Mon, Wed, and Fri. Then 7.5mg on the other four days. sotalol (BETAPACE) 80 MG tablet Take 1 tablet by mouth 2 times daily     Pt was informed of findings today and general questions have been answered with regard to device. Home monitoring hardware is transmitting on schedule. Pt to see Dr. Eliazar Sloan in clinic today following their device check.

## 2020-09-08 RX ORDER — SPIRONOLACTONE 25 MG/1
25 TABLET ORAL DAILY
Qty: 30 TABLET | Refills: 5 | Status: SHIPPED | OUTPATIENT
Start: 2020-09-08 | End: 2021-03-10

## 2020-09-08 NOTE — TELEPHONE ENCOUNTER
Requested Prescriptions     Pending Prescriptions Disp Refills    spironolactone (ALDACTONE) 25 MG tablet [Pharmacy Med Name: SPIRONOLACTONE 25 MG TABS 25 TAB] 30 tablet 3     Sig: TAKE 1 TABLET BY MOUTH DAILY          Number:30    Refills: 5    Last Office Visit: 7/30/2020     Next Office Visit: 9/30/2020

## 2020-09-30 ENCOUNTER — OFFICE VISIT (OUTPATIENT)
Dept: CARDIOLOGY CLINIC | Age: 75
End: 2020-09-30
Payer: MEDICARE

## 2020-09-30 VITALS
SYSTOLIC BLOOD PRESSURE: 120 MMHG | TEMPERATURE: 97.3 F | WEIGHT: 197 LBS | DIASTOLIC BLOOD PRESSURE: 80 MMHG | HEART RATE: 68 BPM | BODY MASS INDEX: 29.95 KG/M2

## 2020-09-30 PROCEDURE — 4040F PNEUMOC VAC/ADMIN/RCVD: CPT | Performed by: INTERNAL MEDICINE

## 2020-09-30 PROCEDURE — 99214 OFFICE O/P EST MOD 30 MIN: CPT | Performed by: INTERNAL MEDICINE

## 2020-09-30 PROCEDURE — G8417 CALC BMI ABV UP PARAM F/U: HCPCS | Performed by: INTERNAL MEDICINE

## 2020-09-30 PROCEDURE — 1036F TOBACCO NON-USER: CPT | Performed by: INTERNAL MEDICINE

## 2020-09-30 PROCEDURE — 1123F ACP DISCUSS/DSCN MKR DOCD: CPT | Performed by: INTERNAL MEDICINE

## 2020-09-30 PROCEDURE — G8428 CUR MEDS NOT DOCUMENT: HCPCS | Performed by: INTERNAL MEDICINE

## 2020-09-30 ASSESSMENT — ENCOUNTER SYMPTOMS
CHOKING: 0
COUGH: 0
SHORTNESS OF BREATH: 0
ABDOMINAL DISTENTION: 0
APNEA: 0
CHEST TIGHTNESS: 0

## 2020-09-30 NOTE — PROGRESS NOTES
Subjective:      Patient ID: Brandy Salas is a 66 y.o. male. Coronary Artery Disease   Pertinent negatives include no chest pain, chest tightness, dizziness, leg swelling, palpitations or shortness of breath. His past medical history is significant for CHF. Hypertension   Pertinent negatives include no chest pain, palpitations or shortness of breath. Shortness of Breath   Pertinent negatives include no chest pain or leg swelling. His past medical history is significant for CAD. Congestive Heart Failure   Pertinent negatives include no chest pain, fatigue, palpitations or shortness of breath. His past medical history is significant for CAD. Here for follow up afib/sss/pacer/htn/cad/CHF. Feeling good. Exercising without problem. No pnd or orthopne. No edema. Wt down. BP good. No chest pain. No sob. Intermittent afib. No syncope. Past Medical History:   Diagnosis Date    Aortic stenosis 2015 Feb 2019 mean gradient 35mg Hg    Atrial fibrillation ([facility] [address] 07/2012    Saw cardiologist for palps. Event recorder showed AF about 5% of the time. Warfarin started. Became persistent in March 2019 when admitted with pneumonia.  CAD (coronary artery disease), native coronary artery Dec 18, 2007    Presented with palpitation to ER. Slight inc. in S. troponin. Cor. angio showed 90% L. circ. Stent placed. (Dr Danny Armas.  Diabetes mellitus ([facility] [address] 1992    HbA1C in Jennifer '15 was 6.9 on metformin and glipizide.  Diastolic congestive heart failure ([facility] [address] 08/2006    presented with wheeze, [id number]. cardiac cath to r/o ischemia. Diffuse CAD no critical stenoses. LVEF 55% LVEDP 31.    Essential hypertension since 1980    Difficult to control with need for minoxidil in addition to ARB,CCB,BB and diuretic.  Gout     Podagra intermittently over the years. Uric acid 9.4 in 2011. Allopurinol started (with colchicine for 3 months)    Hypothyroid March 2011    Total thyroidectomy for indeterminate nodule. Proved benign.  Left carotid bruit Oct 2014    Discovered at routine exam. Carotid Doppler showed bilateral stenosis <50%    Obstructive sleep apnea 1997    as of 2019 still using CPAP    Peripheral neuropathy     2/2 DM.  Pneumonia 97/6311    complicated by hypotension and overdiuresis leading to REYNALDO. S.Cr 1.7 on 3/9/19. Improved to 1.3 on 3/11/19    Sick sinus syndrome (HCC) 02/16/2017    PIP placed because of 7 sec pauses on sotalol. Past Surgical History:   Procedure Laterality Date    CHOLECYSTECTOMY, OPEN  44    CORONARY ANGIOPLASTY  12/17/07    DIAGNOSTIC CARDIAC CATH LAB PROCEDURE  12/17/07    PACEMAKER INSERTION  02/2017    Sick sinus sundrome with 7 sec pauses on sotalol.     THYROIDECTOMY  2011    Benign    TOTAL KNEE ARTHROPLASTY Right 3/12/14     Social History     Socioeconomic History    Marital status: Single     Spouse name: Not on file    Number of children: Not on file    Years of education: Not on file    Highest education level: Not on file   Occupational History    Not on file   Social Needs    Financial resource strain: Not on file    Food insecurity     Worry: Not on file     Inability: Not on file    Transportation needs     Medical: Not on file     Non-medical: Not on file   Tobacco Use    Smoking status: Never Smoker    Smokeless tobacco: Never Used   Substance and Sexual Activity    Alcohol use: No    Drug use: No    Sexual activity: Not on file     Comment: Single   Lifestyle    Physical activity     Days per week: Not on file     Minutes per session: Not on file    Stress: Not on file   Relationships    Social connections     Talks on phone: Not on file     Gets together: Not on file     Attends Buddhism service: Not on file     Active member of club or organization: Not on file     Attends meetings of clubs or organizations: Not on file     Relationship status: Not on file    Intimate partner violence     Fear of current or ex partner: Not on file Assessment:       Diagnosis Orders   1. CAD in native artery     2. History of PTCA     3. Chronic diastolic congestive heart failure ([address]     4. SSS (sick sinus syndrome) ([address]     5. Pacemaker     6. Nonrheumatic aortic valve stenosis     7. Paroxysmal atrial fibrillation (HCC)             Plan:      CV stable. Rhythm stable. BP very good. EP following pacemaker/afib. Rhythm stable. Exercising 2x per wik with . Wt down. On AC, no bleeding issues. Recommended diet, exercise and wt loss. Reviewed previous records and testing including myoview 7/20 and echo 5/20. No changes. Continue to monitor. Follow up 3 months.

## 2020-11-04 ENCOUNTER — NURSE ONLY (OUTPATIENT)
Dept: CARDIOLOGY CLINIC | Age: 75
End: 2020-11-04
Payer: MEDICARE

## 2020-11-04 PROCEDURE — 93294 REM INTERROG EVL PM/LDLS PM: CPT | Performed by: INTERNAL MEDICINE

## 2020-11-04 PROCEDURE — 93296 REM INTERROG EVL PM/IDS: CPT | Performed by: INTERNAL MEDICINE

## 2020-11-04 NOTE — PROGRESS NOTES
We received remote transmission from patient's monitor at home. Transmission shows normal sensing and pacing function. EP physician will review. See interrogation under cardiology tab in the [address] [facility] [address] field for more details. Hx AF (oac, cardizem cd). AT/AF -longest 20 hr. V rates controlled. Time in AT/AF 1.7 hr/day (7.3%)  Follow up in 3 months via [facility].

## 2020-11-18 RX ORDER — WARFARIN SODIUM 7.5 MG/1
TABLET ORAL
Qty: 144 TABLET | Refills: 3 | Status: SHIPPED | OUTPATIENT
Start: 2020-11-18 | End: 2021-10-10

## 2020-12-21 ENCOUNTER — ANTI-COAG VISIT (OUTPATIENT)
Dept: CARDIOLOGY CLINIC | Age: 75
End: 2020-12-21

## 2021-01-04 ENCOUNTER — OFFICE VISIT (OUTPATIENT)
Dept: CARDIOLOGY CLINIC | Age: 76
End: 2021-01-04
Payer: MEDICARE

## 2021-01-04 VITALS
SYSTOLIC BLOOD PRESSURE: 138 MMHG | HEART RATE: 68 BPM | TEMPERATURE: 97.6 F | BODY MASS INDEX: 30.11 KG/M2 | WEIGHT: 198 LBS | DIASTOLIC BLOOD PRESSURE: 80 MMHG

## 2021-01-04 DIAGNOSIS — I48.0 PAROXYSMAL ATRIAL FIBRILLATION (HCC): ICD-10-CM

## 2021-01-04 DIAGNOSIS — I25.10 CAD IN NATIVE ARTERY: Primary | ICD-10-CM

## 2021-01-04 DIAGNOSIS — Z98.61 HISTORY OF PTCA: ICD-10-CM

## 2021-01-04 DIAGNOSIS — Z95.0 PACEMAKER: ICD-10-CM

## 2021-01-04 DIAGNOSIS — I50.32 CHRONIC DIASTOLIC CONGESTIVE HEART FAILURE (HCC): ICD-10-CM

## 2021-01-04 DIAGNOSIS — I35.0 NONRHEUMATIC AORTIC VALVE STENOSIS: ICD-10-CM

## 2021-01-04 DIAGNOSIS — I49.5 SSS (SICK SINUS SYNDROME) (HCC): ICD-10-CM

## 2021-01-04 PROCEDURE — G8484 FLU IMMUNIZE NO ADMIN: HCPCS | Performed by: INTERNAL MEDICINE

## 2021-01-04 PROCEDURE — 99214 OFFICE O/P EST MOD 30 MIN: CPT | Performed by: INTERNAL MEDICINE

## 2021-01-04 PROCEDURE — 1036F TOBACCO NON-USER: CPT | Performed by: INTERNAL MEDICINE

## 2021-01-04 PROCEDURE — 1123F ACP DISCUSS/DSCN MKR DOCD: CPT | Performed by: INTERNAL MEDICINE

## 2021-01-04 PROCEDURE — G8417 CALC BMI ABV UP PARAM F/U: HCPCS | Performed by: INTERNAL MEDICINE

## 2021-01-04 PROCEDURE — G8427 DOCREV CUR MEDS BY ELIG CLIN: HCPCS | Performed by: INTERNAL MEDICINE

## 2021-01-04 PROCEDURE — 4040F PNEUMOC VAC/ADMIN/RCVD: CPT | Performed by: INTERNAL MEDICINE

## 2021-01-04 ASSESSMENT — ENCOUNTER SYMPTOMS
SHORTNESS OF BREATH: 0
CHEST TIGHTNESS: 0
APNEA: 0
ABDOMINAL DISTENTION: 0
CHOKING: 0
COUGH: 0

## 2021-01-04 NOTE — PROGRESS NOTES
Subjective:      Patient ID: Niki Moncada is a 66 y.o. male. Coronary Artery Disease  Pertinent negatives include no chest pain, chest tightness, dizziness, leg swelling, palpitations or shortness of breath. His past medical history is significant for CHF. Hypertension  Pertinent negatives include no chest pain, palpitations or shortness of breath. Shortness of Breath  Pertinent negatives include no chest pain or leg swelling. His past medical history is significant for CAD. Congestive Heart Failure  Pertinent negatives include no chest pain, fatigue, palpitations or shortness of breath. His past medical history is significant for CAD. Here for follow up afib/sss/pacer/htn/cad/CHF. Feeling good. Exercising without problem. Works out with  2x / wk. No pnd or orthopne. No edema. Wt down. BP good. No chest pain. No sob. Intermittent but minimal  afib. No syncope. Past Medical History:   Diagnosis Date    Aortic stenosis 2015 Feb 2019 mean gradient 35mg Hg    [facility]    Saw cardiologist for palps. Event recorder showed AF about 5% of the time. Warfarin started. Became persistent in March 2019 when admitted with pneumonia.  CAD (coronary artery disease), native coronary artery Dec 18, 2007    Presented with palpitation to ER. Slight inc. in S. troponin. Cor. angio showed 90% L. circ. Stent placed. (Dr Tariq Payne.  Diabetes mellitus ([facility] [address] 1992    HbA1C in Jennifer '15 was 6.9 on metformin and glipizide.  Diastolic congestive heart failure ([facility] [address] 08/2006    presented with wheeze, [id number]. cardiac cath to r/o ischemia. Diffuse CAD no critical stenoses. LVEF 55% LVEDP 31.    Essential hypertension since 1980    Difficult to control with need for minoxidil in addition to ARB,CCB,BB and diuretic.  Gout     Podagra intermittently over the years. Uric acid 9.4 in 2011.  Allopurinol started (with colchicine for 3 months)    Hypothyroid March 2011    Total thyroidectomy for indeterminate nodule. Proved benign.  Left carotid bruit Oct 2014    Discovered at routine exam. Carotid Doppler showed bilateral stenosis <50%    Obstructive sleep apnea 1997    as of 2019 still using CPAP    Peripheral neuropathy     2/2 DM.  Pneumonia 65/4701    complicated by hypotension and overdiuresis leading to REYNALDO. S.Cr 1.7 on 3/9/19. Improved to 1.3 on 3/11/19    Sick sinus syndrome (HCC) 02/16/2017    PIP placed because of 7 sec pauses on sotalol. Past Surgical History:   Procedure Laterality Date    CHOLECYSTECTOMY, OPEN  46    CORONARY ANGIOPLASTY  12/17/07    DIAGNOSTIC CARDIAC CATH LAB PROCEDURE  12/17/07    PACEMAKER INSERTION  02/2017    Sick sinus sundrome with 7 sec pauses on sotalol.     THYROIDECTOMY  2011    Benign    TOTAL KNEE ARTHROPLASTY Right 3/12/14     Social History     Socioeconomic History    Marital status: Single     Spouse name: Not on file    Number of children: Not on file    Years of education: Not on file    Highest education level: Not on file   Occupational History    Not on file   Social Needs    Financial resource strain: Not on file    Food insecurity     Worry: Not on file     Inability: Not on file    Transportation needs     Medical: Not on file     Non-medical: Not on file   Tobacco Use    Smoking status: Never Smoker    Smokeless tobacco: Never Used   Substance and Sexual Activity    Alcohol use: No    Drug use: No    Sexual activity: Not on file     Comment: Single   Lifestyle    Physical activity     Days per week: Not on file     Minutes per session: Not on file    Stress: Not on file   Relationships    Social connections     Talks on phone: Not on file     Gets together: Not on file     Attends Yarsani service: Not on file     Active member of club or organization: Not on file     Attends meetings of clubs or organizations: Not on file     Relationship status: Not on file    Intimate partner violence Fear of current or ex partner: Not on file     Emotionally abused: Not on file     Physically abused: Not on file     Forced sexual activity: Not on file   Other Topics Concern    Not on file   Social History Narrative    Not on file       FH reviewed, denies FH cardiac issues. Vitals:    01/04/21 1451   BP: 138/80   Pulse: 68   Temp: 97.6 °F (36.4 °C)         Wt 198      Review of Systems   Constitutional: Negative for activity change and fatigue. Respiratory: Negative for apnea, cough, choking, chest tightness and shortness of breath. Cardiovascular: Negative for chest pain, palpitations and leg swelling. No PND or orthopnea. No tachycardia. Gastrointestinal: Negative for abdominal distention. Musculoskeletal: Negative for myalgias. Neurological: Negative for dizziness, syncope and light-headedness. Psychiatric/Behavioral: Negative for agitation, behavioral problems and confusion. All other systems reviewed negative as done. Objective:   Physical Exam   Constitutional: He is oriented to person, place, and time. He appears well-developed and well-nourished. No distress. HENT:   Head: Normocephalic and atraumatic. Eyes: Conjunctivae and EOM are normal. Right eye exhibits no discharge. Left eye exhibits no discharge. Neck: Normal range of motion. Neck supple. No JVD present. Cardiovascular: Normal rate, regular rhythm, S1 normal and S2 normal. Exam reveals no gallop. Murmur heard. Pulses:       Radial pulses are 2+ on the right side and 2+ on the left side. 2/6 syst M   Pulmonary/Chest: Effort normal and breath sounds normal. He has no wheezes. He has no rales. Abdominal: Soft. Bowel sounds are normal. There is no abdominal tenderness. Musculoskeletal: Normal range of motion. General: No edema. Neurological: He is alert and oriented to person, place, and time. Skin: Skin is warm and dry. Psychiatric: He has a normal mood and affect.  His behavior is normal. Thought content normal.       Assessment:       Diagnosis Orders   1. CAD in native artery     2. History of PTCA     3. Chronic diastolic congestive heart failure ([address]     4. SSS (sick sinus syndrome) ([address]     5. Pacemaker     6. Nonrheumatic aortic valve stenosis     7. Paroxysmal atrial fibrillation (HCC)             Plan:      CV stable. Rhythm stable. BP very good. EP following pacemaker/afib. Rhythm stable. Exercising 2x per wik with . Wt stable. On AC, no bleeding issues. Recommended diet, exercise and wt loss. Reviewed previous records and testing including myoview 7/20 and echo 5/20. No changes. Continue to monitor. Follow up 3 months.

## 2021-02-09 ENCOUNTER — NURSE ONLY (OUTPATIENT)
Dept: CARDIOLOGY CLINIC | Age: 76
End: 2021-02-09
Payer: MEDICARE

## 2021-02-09 DIAGNOSIS — Z95.0 PACEMAKER: ICD-10-CM

## 2021-02-09 DIAGNOSIS — R00.1 BRADYCARDIA: ICD-10-CM

## 2021-02-09 PROCEDURE — 93296 REM INTERROG EVL PM/IDS: CPT | Performed by: INTERNAL MEDICINE

## 2021-02-09 PROCEDURE — 93294 REM INTERROG EVL PM/LDLS PM: CPT | Performed by: INTERNAL MEDICINE

## 2021-02-09 NOTE — PROGRESS NOTES
We received remote transmission from patient's monitor at home. Transmission shows normal sensing and pacing function. EP physician will review. See interrogation under cardiology tab in the [address] [facility] [address] field for more details. Hx pAF/AT (oac, betapace, cardizem cd). Episodes Since: 03-Nov-2020  15 Non-sustained VT, 5 Monitored AT/AF  1 Fast A & V-egms show pAT, AT/SVT-rvr and AF-rvr. Longest AF 18.5 hrs and last recorded on 12/20/2020. V rates >100 < 150 bpm while in AT/AF. Follow up in 3 months via carelink.   ANSLEY MOORE 6/7

## 2021-02-19 RX ORDER — DILTIAZEM HYDROCHLORIDE 120 MG/1
CAPSULE, COATED, EXTENDED RELEASE ORAL
Qty: 180 CAPSULE | Refills: 3 | Status: SHIPPED | OUTPATIENT
Start: 2021-02-19 | End: 2021-06-07

## 2021-02-19 NOTE — TELEPHONE ENCOUNTER
Requested Prescriptions     Pending Prescriptions Disp Refills    dilTIAZem (CARDIZEM CD) 120 MG extended release capsule [Pharmacy Med Name: DILTIAZEM HCL  120 Capsule] 180 capsule 3     Sig: TAKE 2 CAPSULES BY MOUTH DAILY              Last Office Visit: 01/04/2021  Next Office Visit:04/12/2021

## 2021-03-30 NOTE — TELEPHONE ENCOUNTER
Requested Prescriptions     Pending Prescriptions Disp Refills    sotalol (BETAPACE) 80 MG tablet [Pharmacy Med Name: SOTALOL HCL 80 MG TABS 80 Tablet] 180 tablet 3     Sig: TAKE 1 TABLET BY MOUTH 2 TIMES DAILY          Last Office Visit: 1/4/2021     Next Office Visit: 4/14/2021     Last Refill: 04/10/2020    Last Labs: 03/10/2021 Microalbumin   03/03/2021 T4, Free,CBC, TSH, PSA,Lipid, CMP

## 2021-03-30 NOTE — TELEPHONE ENCOUNTER
[facility] Medication Refills:    Medication: Furosemide    Dosage: 20    Number: [id number]    Refills: 3    Last Office Visit: 1/04/2021    Next Office Visit: 06/07/2021    Last Refill: 03/17/2020    Last Labs: 03/10/2021 Microalbumin  03/03/2021 Hemoglobin, T4 Free, CBc, TSH, PSA, Lipid, CMP

## 2021-04-02 RX ORDER — FUROSEMIDE 20 MG/1
60 TABLET ORAL DAILY
Qty: 270 TABLET | Refills: 3 | Status: SHIPPED | OUTPATIENT
Start: 2021-04-02 | End: 2021-06-07 | Stop reason: DRUGHIGH

## 2021-04-02 RX ORDER — SOTALOL HYDROCHLORIDE 80 MG/1
80 TABLET ORAL 2 TIMES DAILY
Qty: 180 TABLET | Refills: 3 | Status: SHIPPED | OUTPATIENT
Start: 2021-04-02

## 2021-04-14 ENCOUNTER — OFFICE VISIT (OUTPATIENT)
Dept: CARDIOLOGY CLINIC | Age: 76
End: 2021-04-14
Payer: MEDICARE

## 2021-04-14 VITALS
HEART RATE: 60 BPM | WEIGHT: 172 LBS | BODY MASS INDEX: 26.15 KG/M2 | DIASTOLIC BLOOD PRESSURE: 70 MMHG | SYSTOLIC BLOOD PRESSURE: 130 MMHG

## 2021-04-14 DIAGNOSIS — I10 ESSENTIAL HYPERTENSION: ICD-10-CM

## 2021-04-14 DIAGNOSIS — I35.0 NONRHEUMATIC AORTIC VALVE STENOSIS: ICD-10-CM

## 2021-04-14 DIAGNOSIS — Z95.0 PACEMAKER: ICD-10-CM

## 2021-04-14 DIAGNOSIS — I49.5 SSS (SICK SINUS SYNDROME) (HCC): ICD-10-CM

## 2021-04-14 DIAGNOSIS — I48.0 PAROXYSMAL ATRIAL FIBRILLATION (HCC): ICD-10-CM

## 2021-04-14 DIAGNOSIS — I25.10 CAD IN NATIVE ARTERY: Primary | ICD-10-CM

## 2021-04-14 DIAGNOSIS — Z98.61 HISTORY OF PTCA: ICD-10-CM

## 2021-04-14 DIAGNOSIS — I50.32 CHRONIC DIASTOLIC CONGESTIVE HEART FAILURE (HCC): ICD-10-CM

## 2021-04-14 PROCEDURE — 4040F PNEUMOC VAC/ADMIN/RCVD: CPT | Performed by: INTERNAL MEDICINE

## 2021-04-14 PROCEDURE — 99213 OFFICE O/P EST LOW 20 MIN: CPT | Performed by: INTERNAL MEDICINE

## 2021-04-14 PROCEDURE — 1036F TOBACCO NON-USER: CPT | Performed by: INTERNAL MEDICINE

## 2021-04-14 PROCEDURE — G8417 CALC BMI ABV UP PARAM F/U: HCPCS | Performed by: INTERNAL MEDICINE

## 2021-04-14 PROCEDURE — 1123F ACP DISCUSS/DSCN MKR DOCD: CPT | Performed by: INTERNAL MEDICINE

## 2021-04-14 PROCEDURE — G8427 DOCREV CUR MEDS BY ELIG CLIN: HCPCS | Performed by: INTERNAL MEDICINE

## 2021-04-14 ASSESSMENT — ENCOUNTER SYMPTOMS
COUGH: 0
APNEA: 0
CHEST TIGHTNESS: 0
SHORTNESS OF BREATH: 0
CHOKING: 0
ABDOMINAL DISTENTION: 0

## 2021-04-14 NOTE — PROGRESS NOTES
Subjective:      Patient ID: Gabriel Waldron is a 66 y.o. male. Coronary Artery Disease  Pertinent negatives include no chest pain, chest tightness, dizziness, leg swelling, palpitations or shortness of breath. His past medical history is significant for CHF. Hypertension  Pertinent negatives include no chest pain, palpitations or shortness of breath. Shortness of Breath  Pertinent negatives include no chest pain or leg swelling. His past medical history is significant for CAD. Congestive Heart Failure  Pertinent negatives include no chest pain, fatigue, palpitations or shortness of breath. His past medical history is significant for CAD. Here for follow up afib/sss/pacer/htn/cad/PTCA/CHF. Feeling good. Exercising without problem. Works out with  2x / wk. No pnd or orthopne. No edema. Wt down 30 lbs. BP good. No chest pain. No sob. Intermittent but minimal  afib. No syncope. Past Medical History:   Diagnosis Date    Aortic stenosis 2015 Feb 2019 mean gradient 35mg Hg    Atrial fibrillation ([facility] [address] 07/2012    Saw cardiologist for palps. Event recorder showed AF about 5% of the time. Warfarin started. Became persistent in March 2019 when admitted with pneumonia.  CAD (coronary artery disease), native coronary artery Dec 18, 2007    Presented with palpitation to ER. Slight inc. in S. troponin. Cor. angio showed 90% L. circ. Stent placed. (Dr Evan Velázquez.  Diabetes mellitus ([facility] [address] 1992    HbA1C in Jennifer '15 was 6.9 on metformin and glipizide.  Diastolic congestive heart failure ([facility] [address] 08/2006    presented with wheeze, [id number]. cardiac cath to r/o ischemia. Diffuse CAD no critical stenoses. LVEF 55% LVEDP 31.    Essential hypertension since 1980    Difficult to control with need for minoxidil in addition to ARB,CCB,BB and diuretic.  Gout     Podagra intermittently over the years. Uric acid 9.4 in 2011.  Allopurinol started (with colchicine for 3 months)    Hypothyroid March 2011 Total thyroidectomy for indeterminate nodule. Proved benign.  Left carotid bruit Oct 2014    Discovered at routine exam. Carotid Doppler showed bilateral stenosis <50%    Obstructive sleep apnea 1997    as of 2019 still using CPAP    Peripheral neuropathy     2/2 DM.  Pneumonia 26/5175    complicated by hypotension and overdiuresis leading to REYNALDO. S.Cr 1.7 on 3/9/19. Improved to 1.3 on 3/11/19    Sick sinus syndrome (HCC) 02/16/2017    PIP placed because of 7 sec pauses on sotalol. Past Surgical History:   Procedure Laterality Date    CHOLECYSTECTOMY, OPEN  44    CORONARY ANGIOPLASTY  12/17/07    DIAGNOSTIC CARDIAC CATH LAB PROCEDURE  12/17/07    PACEMAKER INSERTION  02/2017    Sick sinus sundrome with 7 sec pauses on sotalol.     THYROIDECTOMY  2011    Benign    TOTAL KNEE ARTHROPLASTY Right 3/12/14     Social History     Socioeconomic History    Marital status: Single     Spouse name: Not on file    Number of children: Not on file    Years of education: Not on file    Highest education level: Not on file   Occupational History    Not on file   Social Needs    Financial resource strain: Not on file    Food insecurity     Worry: Not on file     Inability: Not on file    Transportation needs     Medical: Not on file     Non-medical: Not on file   Tobacco Use    Smoking status: Never Smoker    Smokeless tobacco: Never Used   Substance and Sexual Activity    Alcohol use: No    Drug use: No    Sexual activity: Not on file     Comment: Single   Lifestyle    Physical activity     Days per week: Not on file     Minutes per session: Not on file    Stress: Not on file   Relationships    Social connections     Talks on phone: Not on file     Gets together: Not on file     Attends L[employer]-day service: Not on file     Active member of club or organization: Not on file     Attends meetings of clubs or organizations: Not on file     Relationship status: Not on file    Intimate partner violence     Fear of current or ex partner: Not on file     Emotionally abused: Not on file     Physically abused: Not on file     Forced sexual activity: Not on file   Other Topics Concern    Not on file   Social History Narrative    Not on file       FH reviewed, denies FH cardiac issues. Vitals:    04/14/21 0958   BP: 130/70   Pulse: 60       Wt 198      Review of Systems   Constitutional: Negative for activity change and fatigue. Respiratory: Negative for apnea, cough, choking, chest tightness and shortness of breath. Cardiovascular: Negative for chest pain, palpitations and leg swelling. No PND or orthopnea. No tachycardia. Gastrointestinal: Negative for abdominal distention. Musculoskeletal: Negative for myalgias. Neurological: Negative for dizziness, syncope and light-headedness. Psychiatric/Behavioral: Negative for agitation, behavioral problems and confusion. All other systems reviewed negative as done. Objective:   Physical Exam   Constitutional: He is oriented to person, place, and time. He appears well-developed and well-nourished. No distress. HENT:   Head: Normocephalic and atraumatic. Eyes: Conjunctivae and EOM are normal. Right eye exhibits no discharge. Left eye exhibits no discharge. Neck: Normal range of motion. Neck supple. No JVD present. Cardiovascular: Normal rate, regular rhythm, S1 normal and S2 normal. Exam reveals no gallop. Murmur heard. Pulses:       Radial pulses are 2+ on the right side and 2+ on the left side. 2/6 syst M   Pulmonary/Chest: Effort normal and breath sounds normal. He has no wheezes. He has no rales. Abdominal: Soft. Bowel sounds are normal. There is no abdominal tenderness. Musculoskeletal: Normal range of motion. General: No edema. Neurological: He is alert and oriented to person, place, and time. Skin: Skin is warm and dry. Psychiatric: He has a normal mood and affect.  His behavior is normal. Thought content normal.       Assessment:       Diagnosis Orders   1. CAD in native artery     2. History of PTCA     3. Chronic diastolic congestive heart failure ([address]     4. SSS (sick sinus syndrome) ([address]     5. Pacemaker     6. Nonrheumatic aortic valve stenosis     7. Paroxysmal atrial fibrillation (HCC)     8. Essential hypertension               Plan:      CV stable. Rhythm stable. BP very good. Low a times. Now off minoxidil. Lost 30 lbs. EP following pacemaker/afib. Rhythm stable. Exercising 2x per wik with . Wt stable. On AC, no bleeding issues. Recommended diet, exercise and wt loss. Reviewed previous records and testing including myoview 7/20 and echo 5/20. No changes. Continue to monitor. Follow up 3 months.

## 2021-05-11 ENCOUNTER — NURSE ONLY (OUTPATIENT)
Dept: CARDIOLOGY CLINIC | Age: 76
End: 2021-05-11
Payer: MEDICARE

## 2021-05-11 DIAGNOSIS — R00.1 BRADYCARDIA: ICD-10-CM

## 2021-05-11 DIAGNOSIS — Z95.0 PACEMAKER: Primary | ICD-10-CM

## 2021-05-11 DIAGNOSIS — I48.0 PAROXYSMAL ATRIAL FIBRILLATION (HCC): ICD-10-CM

## 2021-05-11 DIAGNOSIS — I49.5 SICK SINUS SYNDROME (HCC): ICD-10-CM

## 2021-05-26 NOTE — PROGRESS NOTES
[address]   Cardiac Consultation    Referring Provider:  Yakelin Lyn MD     Chief Concerns: PAF, SSS s/p Medtronic dual chamber pacemaker (2/15/17 by Dr Juan Ramon Mejía)    HPI:  Mariangel Mixon is a 64 y.o. male . Patient has hx of CAD s/p PCI x 1 (2007) by Dr Luis Carlos Mccullough, mild AS, BRAD on nasal CPAP, NIDDM, hypothyroidism s/p total thyroidectomy, HTN, gout, PAF & SSS, S/p dual-chamber Medtronic PPM on 2/15/2017 (Dr. Juan Ramon Mejía). He has  mild AS, normal LVEF, CAD (PCI in 2007 x 1), symptomatic AF and what appeared to be symptomatic post conversion pauses while on large dose of fito agents. D/c'd BB, started dilt and low dose sotalol with low AF burden. Noted to have greater than 7 second pause on outpatient ambulatory monitor. Negative stress test in 2016. His sotalol was increased to 80mg BID in March in the hospital when he was admitted for CHF. ECHO 5/15/20 LVEF 55-60%. He is currently taking lasix 20 mg daily, sotalol 80 mg BID, diltiazem 240 mg daily, minoxidil 5 mg BID, warfarin, and aspirin 81 mg daily. He presents today for follow up for PAF and device management. He states that he can occasionally feel his AF. His exercise tolerance is noticeably impaired by the AF. Past Medical History:  Social History:   reports that he has never smoked. He has never used smokeless tobacco. He reports that he does not drink alcohol and does not use drugs. Family History:  family history includes Heart Attack in his maternal grandfather; Heart Defect in his maternal grandfather and mother; Heart Disease in his father; Heart Surgery in his father; Pacemaker in his mother.      Home Medications:  Outpatient Encounter Medications as of 6/7/2021   Medication Sig Dispense Refill    furosemide (LASIX) 20 MG tablet TAKE 3 TABLETS BY MOUTH DAILY 270 tablet 3    sotalol (BETAPACE) 80 MG tablet TAKE 1 TABLET BY MOUTH 2 TIMES DAILY 180 tablet 3    spironolactone (ALDACTONE) 25 MG tablet TAKE 1 TABLET BY MOUTH DAILY 90 tablet 0    dilTIAZem (CARDIZEM CD) 120 MG extended release capsule TAKE 2 CAPSULES BY MOUTH DAILY 180 capsule 3    minoxidil (LONITEN) 10 MG tablet TAKE 1/2 TABLET BY MOUTH 2 TIMES A DAY 90 tablet 3    warfarin (COUMADIN) 7.5 MG tablet PATIENT TAKES 5MG ON MON, WED, AND FRI. THEN 7.5MG ON THE OTHER FOUR DAYS. 144 tablet 3    warfarin (COUMADIN) 5 MG tablet TAKE AS ID VARYING SCHEDULE 30 tablet 5    cetirizine (ZYRTEC) 10 MG tablet Take 10 mg by mouth daily      oxybutynin (DITROPAN) 5 MG/5ML syrup Take 5 mg by mouth as needed       glipiZIDE (GLUCOTROL) 5 MG tablet Take 5 mg by mouth 2 times daily       tamsulosin (FLOMAX) 0.4 MG capsule Take 0.4 mg by mouth nightly      traZODone (DESYREL) 100 MG tablet Take 100 mg by mouth nightly      atorvastatin (LIPITOR) 40 MG tablet Take 40 mg by mouth nightly       aspirin 81 MG tablet Take 81 mg by mouth daily.  diclofenac sodium (VOLTAREN) 1 % GEL Apply 2 g topically 2 times daily as needed       levothyroxine (SYNTHROID) 150 MCG tablet Take 150 mcg by mouth daily.  allopurinol (ZYLOPRIM) 300 MG tablet Take 300 mg by mouth nightly       gemfibrozil (LOPID) 600 MG tablet Take 600 mg by mouth 2 times daily (before meals).  metformin (GLUCOPHAGE) 1000 MG tablet Take 1,000 mg by mouth 2 times daily (with meals).  Multiple Vitamin (MULTIVITAMIN PO) Take 1 tablet by mouth daily       alprazolam (XANAX) 0.25 MG tablet Take 0.25 mg by mouth daily as needed for Anxiety        No facility-administered encounter medications on file as of 6/7/2021. Allergies:  Erythromycin     Review of Systems   Constitutional: Negative. HENT: Negative. Eyes: Negative. Respiratory: Negative. Cardiovascular: Negative. Gastrointestinal: Negative. Genitourinary: Negative. Musculoskeletal: Negative. Skin: Negative. Neurological: Negative. Hematological: Negative. Psychiatric/Behavioral: Negative.     There were no vitals taken for this visit. EKG 5/26/21 Personally reviewed. 7/8/20 Stress test  Summary     There is normal isotope uptake at stress and rest. There is no evidence of     myocardial ischemia or scar.     Normal LV size and systolic function.     Left ventricular ejection fraction of 73 %.     There are no regional wall motion abnormalities.     Overall findings represent a low risk scan. Objective:  Physical Exam   Constitutional: He is oriented to person, place, and time. He appears well-developed and well-nourished. HENT:   Head: Normocephalic and atraumatic. Eyes: Pupils are equal, round, and reactive to light. Neck: Normal range of motion. Cardiovascular: Normal rate, regular rhythm and  2/6SEM    Pulmonary/Chest: Effort normal and breath sounds normal.   Abdominal: Soft. No tenderness. Musculoskeletal: Normal range of motion. He exhibits no edema. Neurological: He is alert and oriented to person, place, and time. Skin: Skin is warm and dry. Psychiatric: He has a normal mood and affect. 5/15/20 ECHO   Summary   Left ventricular cavity size is normal. There is mild concentric left   ventricular hypertrophy. Overall left ventricular systolic function appears normal with an ejection   fraction of 55-60%. No regional wall motion abnormalities are noted. Diastolic filling parameters suggest grade II diastolic dysfunction. Mitral annular calcification is present. The aortic valve is thickened/calcified. Moderate aortic stenosis with a peak velocity of 3.31m/s and a mean pressure   gradient of 27mmHg. Mild tricuspid regurgitation. Estimated pulmonary artery systolic pressure is at 34 mmHg assuming a right   atrial pressure of 3 mmHg. The left atrium is mildly dilated. Stress test 7/8/20:  Summary There is normal isotope uptake at stress and rest. There is no evidence of  myocardial ischemia or scar. Normal LV size and systolic function.   Left ventricular ejection fraction of 73 %. There are no regional wall motion abnormalities. Overall findings represent a low risk scan. Assessment:  1. PAF/SSS- s/p Medtronic DDD pacemaker 2017. He is taking Cartia 120mg QD & Sotalol 80 mg BID. He is also on warfarin for stroke prevention. Dr Yao Emperor office manages his INR. He shows no S/S of bleeding. Device today shows AF at 14%. Discussed PVI as a treatment option as he has paroxysmal AF which is symptomatic and has not responded to pharmacologic therapy. 2. Thyroid nodule-s/p total thyroidectomy  3. BRAD- on nasal CPAP  4. CAD-     Plan:  1. Continue medications including sotalol 80 mg daily. 2. F/U with  me in 6 month with device check  3. Continue with low to moderate activity. 4. He will consider AF ablation but wishes to hold off for now.          Dipti Grant M.D.

## 2021-05-28 PROCEDURE — 93294 REM INTERROG EVL PM/LDLS PM: CPT | Performed by: INTERNAL MEDICINE

## 2021-05-28 PROCEDURE — 93296 REM INTERROG EVL PM/IDS: CPT | Performed by: INTERNAL MEDICINE

## 2021-06-07 ENCOUNTER — NURSE ONLY (OUTPATIENT)
Dept: CARDIOLOGY CLINIC | Age: 76
End: 2021-06-07
Payer: MEDICARE

## 2021-06-07 ENCOUNTER — OFFICE VISIT (OUTPATIENT)
Dept: CARDIOLOGY CLINIC | Age: 76
End: 2021-06-07
Payer: MEDICARE

## 2021-06-07 VITALS
HEART RATE: 66 BPM | WEIGHT: 167.4 LBS | SYSTOLIC BLOOD PRESSURE: 110 MMHG | BODY MASS INDEX: 25.45 KG/M2 | DIASTOLIC BLOOD PRESSURE: 61 MMHG

## 2021-06-07 DIAGNOSIS — Z95.0 PACEMAKER: Primary | ICD-10-CM

## 2021-06-07 DIAGNOSIS — I49.5 SICK SINUS SYNDROME (HCC): ICD-10-CM

## 2021-06-07 DIAGNOSIS — I50.32 CHRONIC DIASTOLIC (CONGESTIVE) HEART FAILURE (HCC): ICD-10-CM

## 2021-06-07 DIAGNOSIS — R55 SYNCOPE, UNSPECIFIED SYNCOPE TYPE: ICD-10-CM

## 2021-06-07 DIAGNOSIS — I48.0 PAROXYSMAL ATRIAL FIBRILLATION (HCC): Primary | ICD-10-CM

## 2021-06-07 DIAGNOSIS — R00.1 BRADYCARDIA: ICD-10-CM

## 2021-06-07 DIAGNOSIS — R00.2 PALPITATIONS: ICD-10-CM

## 2021-06-07 DIAGNOSIS — I48.0 PAROXYSMAL ATRIAL FIBRILLATION (HCC): ICD-10-CM

## 2021-06-07 DIAGNOSIS — I50.31 ACUTE DIASTOLIC (CONGESTIVE) HEART FAILURE (HCC): ICD-10-CM

## 2021-06-07 DIAGNOSIS — I50.32 CHRONIC DIASTOLIC CONGESTIVE HEART FAILURE (HCC): ICD-10-CM

## 2021-06-07 PROCEDURE — 1123F ACP DISCUSS/DSCN MKR DOCD: CPT | Performed by: INTERNAL MEDICINE

## 2021-06-07 PROCEDURE — G8417 CALC BMI ABV UP PARAM F/U: HCPCS | Performed by: INTERNAL MEDICINE

## 2021-06-07 PROCEDURE — G8427 DOCREV CUR MEDS BY ELIG CLIN: HCPCS | Performed by: INTERNAL MEDICINE

## 2021-06-07 PROCEDURE — 1036F TOBACCO NON-USER: CPT | Performed by: INTERNAL MEDICINE

## 2021-06-07 PROCEDURE — 4040F PNEUMOC VAC/ADMIN/RCVD: CPT | Performed by: INTERNAL MEDICINE

## 2021-06-07 PROCEDURE — 99214 OFFICE O/P EST MOD 30 MIN: CPT | Performed by: INTERNAL MEDICINE

## 2021-06-07 RX ORDER — FUROSEMIDE 20 MG/1
20 TABLET ORAL DAILY
Qty: 90 TABLET | Refills: 5 | Status: SHIPPED | OUTPATIENT
Start: 2021-06-07

## 2021-06-07 NOTE — PROGRESS NOTES
[address]   Cardiac Consultation    Referring Provider:  Franki King MD     Chief Concerns: PAF, SSS s/p Medtronic dual chamber pacemaker (2/15/17 by Dr Smiley Kemp)    HPI:  Harper Terrazas is a 64 y.o. male . Patient has hx of CAD s/p PCI x 1 (2007) by Dr Lilia Guzman, mild AS, BRAD on nasal CPAP, NIDDM, hypothyroidism s/p total thyroidectomy, HTN, gout, PAF & SSS, S/p dual-chamber Medtronic PPM on 2/15/2017 (Dr. Smiley Kemp). He has  mild AS, normal LVEF, CAD (PCI in 2007 x 1), symptomatic AF and what appeared to be symptomatic post conversion pauses while on large dose of fito agents. D/c'd BB, started dilt and low dose sotalol with low AF burden. Noted to have greater than 7 second pause on outpatient ambulatory monitor. Negative stress test in 2016. His sotalol was increased to 80mg BID in March in the hospital when he was admitted for CHF. ECHO 5/15/20 LVEF 55-60%. He is currently taking lasix 40 mg daily, sotalol 80 mg BID, diltiazem 240 mg daily, minoxidil 5 mg BID, warfarin, and aspirin 81 mg daily. He presents today for 6 month follow up for PAF and device management, presenting in SR. Device interrogation today shows normally functioning PPM with stable sensing and pacing thresholds. AP 72%,  0.8%. AF burden of 2.7%. LUCIANA at 4 yrs. He states he has been feeling lightheaded with postural changes at times. He has also intentionally lost 40 lbs since 1/2021. Yesterday morning his BP was 86/64 and this morning was 99/68. Dr. Lilia Guzman stopped his minoxidil. Past Medical History:  Social History:   reports that he has never smoked. He has never used smokeless tobacco. He reports that he does not drink alcohol and does not use drugs. Family History:  family history includes Heart Attack in his maternal grandfather; Heart Defect in his maternal grandfather and mother; Heart Disease in his father; Heart Surgery in his father; Pacemaker in his mother.      Home Medications:  Outpatient Encounter Medications as of 6/7/2021   Medication Sig Dispense Refill    furosemide (LASIX) 20 MG tablet TAKE 3 TABLETS BY MOUTH DAILY 270 tablet 3    sotalol (BETAPACE) 80 MG tablet TAKE 1 TABLET BY MOUTH 2 TIMES DAILY 180 tablet 3    spironolactone (ALDACTONE) 25 MG tablet TAKE 1 TABLET BY MOUTH DAILY 90 tablet 0    dilTIAZem (CARDIZEM CD) 120 MG extended release capsule TAKE 2 CAPSULES BY MOUTH DAILY 180 capsule 3    warfarin (COUMADIN) 7.5 MG tablet PATIENT TAKES 5MG ON MON, WED, AND FRI. THEN 7.5MG ON THE OTHER FOUR DAYS. 144 tablet 3    warfarin (COUMADIN) 5 MG tablet TAKE AS AL VARYING SCHEDULE 30 tablet 5    cetirizine (ZYRTEC) 10 MG tablet Take 10 mg by mouth daily      oxybutynin (DITROPAN) 5 MG/5ML syrup Take 5 mg by mouth as needed       glipiZIDE (GLUCOTROL) 5 MG tablet Take 2.5 mg by mouth daily       tamsulosin (FLOMAX) 0.4 MG capsule Take 0.4 mg by mouth nightly      traZODone (DESYREL) 100 MG tablet Take 100 mg by mouth nightly      atorvastatin (LIPITOR) 40 MG tablet Take 40 mg by mouth nightly       aspirin 81 MG tablet Take 81 mg by mouth daily.  diclofenac sodium (VOLTAREN) 1 % GEL Apply 2 g topically 2 times daily as needed       levothyroxine (SYNTHROID) 150 MCG tablet Take 150 mcg by mouth daily.  allopurinol (ZYLOPRIM) 300 MG tablet Take 300 mg by mouth nightly       gemfibrozil (LOPID) 600 MG tablet Take 600 mg by mouth 2 times daily (before meals).  metformin (GLUCOPHAGE) 1000 MG tablet Take 1,000 mg by mouth 2 times daily (with meals).  Multiple Vitamin (MULTIVITAMIN PO) Take 1 tablet by mouth daily       alprazolam (XANAX) 0.25 MG tablet Take 0.25 mg by mouth daily as needed for Anxiety       [DISCONTINUED] minoxidil (LONITEN) 10 MG tablet TAKE 1/2 TABLET BY MOUTH 2 TIMES A DAY 90 tablet 3     No facility-administered encounter medications on file as of 6/7/2021. Allergies:  Erythromycin     Review of Systems   Constitutional: Negative. HENT: Negative. Eyes: Negative. Respiratory: Negative. Cardiovascular: Negative. Gastrointestinal: Negative. Genitourinary: Negative. Musculoskeletal: Negative. Skin: Negative. Neurological: Negative. Hematological: Negative. Psychiatric/Behavioral: Negative. /61   Pulse 66   Wt 167 lb 6.4 oz (75.9 kg)   BMI 25.45 kg/m²     EKG 6/7/21 Personally reviewed. SR with QTc 458 ms    7/8/20 Stress test  Summary     There is normal isotope uptake at stress and rest. There is no evidence of     myocardial ischemia or scar.     Normal LV size and systolic function.     Left ventricular ejection fraction of 73 %.     There are no regional wall motion abnormalities.     Overall findings represent a low risk scan. Objective:  Physical Exam   Constitutional: He is oriented to person, place, and time. He appears well-developed and well-nourished. HENT:   Head: Normocephalic and atraumatic. Eyes: Pupils are equal, round, and reactive to light. Neck: Normal range of motion. Cardiovascular: Normal rate, regular rhythm and  2/6SEM    Pulmonary/Chest: Effort normal and breath sounds normal.   Abdominal: Soft. No tenderness. Musculoskeletal: Normal range of motion. He exhibits no edema. Neurological: He is alert and oriented to person, place, and time. Skin: Skin is warm and dry. Psychiatric: He has a normal mood and affect. 5/15/20 ECHO   Summary   Left ventricular cavity size is normal. There is mild concentric left   ventricular hypertrophy. Overall left ventricular systolic function appears normal with an ejection   fraction of 55-60%. No regional wall motion abnormalities are noted. Diastolic filling parameters suggest grade II diastolic dysfunction. Mitral annular calcification is present. The aortic valve is thickened/calcified. Moderate aortic stenosis with a peak velocity of 3.31m/s and a mean pressure   gradient of 27mmHg.    Mild tricuspid regurgitation. Estimated pulmonary artery systolic pressure is at 34 mmHg assuming a right   atrial pressure of 3 mmHg. The left atrium is mildly dilated. Stress test 7/8/20:  Summary There is normal isotope uptake at stress and rest. There is no evidence of  myocardial ischemia or scar. Normal LV size and systolic function. Left ventricular ejection fraction of 73 %. There are no regional wall motion abnormalities. Overall findings represent a low risk scan. Assessment:  1. PAF/SSS- s/p Medtronic DDD pacemaker 2017. He is taking Cartia 120mg QD & Sotalol 80 mg BID with stable QTc. He is also on warfarin for stroke prevention. Dr Kyle Arvin office manages his INR. He shows no S/S of bleeding. AF burden had been 14%. Discussed PVI as a treatment option as he has paroxysmal AF which is symptomatic, but he wished to think about it. Device today shows AF burden of 2.7%, none since 1/2021.    2. Thyroid nodule-s/p total thyroidectomy  3. BRAD- on nasal CPAP  4. CAD  5. Hypotension- He has recently lost 40 lbs and his BPs have been soft with complaints of orthostatic dizziness. Plan:  1. Stop diltiazem today due to soft BPs and orthostatic dizziness  2. Decrease Lasix to 20 mg daily, may take additional 20 mg if weight increases  3. Continue sotalol 80 mg BID and warfarin  4. Follow up with EP NP in 6 months, 12 months with Salina Montez RN, am scribing for and in the presence of Dr. John Hill. 06/07/21 11:12 AM  SHANTEL Crawford, Dr. John Hill, personally performed the services described in this documentation as scribed by Salina Crespo RN in my presence, and it is both accurate and complete.       John Hill M.D.

## 2021-06-10 PROCEDURE — 93280 PM DEVICE PROGR EVAL DUAL: CPT | Performed by: INTERNAL MEDICINE

## 2021-07-21 ENCOUNTER — OFFICE VISIT (OUTPATIENT)
Dept: CARDIOLOGY CLINIC | Age: 76
End: 2021-07-21
Payer: MEDICARE

## 2021-07-21 VITALS
WEIGHT: 167 LBS | DIASTOLIC BLOOD PRESSURE: 70 MMHG | SYSTOLIC BLOOD PRESSURE: 110 MMHG | BODY MASS INDEX: 25.39 KG/M2 | HEART RATE: 58 BPM

## 2021-07-21 DIAGNOSIS — I25.10 CAD IN NATIVE ARTERY: Primary | ICD-10-CM

## 2021-07-21 DIAGNOSIS — Z95.0 PACEMAKER: ICD-10-CM

## 2021-07-21 DIAGNOSIS — I10 ESSENTIAL HYPERTENSION: ICD-10-CM

## 2021-07-21 DIAGNOSIS — I35.0 NONRHEUMATIC AORTIC VALVE STENOSIS: ICD-10-CM

## 2021-07-21 DIAGNOSIS — I49.5 SSS (SICK SINUS SYNDROME) (HCC): ICD-10-CM

## 2021-07-21 DIAGNOSIS — Z98.61 HISTORY OF PTCA: ICD-10-CM

## 2021-07-21 DIAGNOSIS — I48.0 PAROXYSMAL ATRIAL FIBRILLATION (HCC): ICD-10-CM

## 2021-07-21 DIAGNOSIS — I50.32 CHRONIC DIASTOLIC CONGESTIVE HEART FAILURE (HCC): ICD-10-CM

## 2021-07-21 PROCEDURE — 1123F ACP DISCUSS/DSCN MKR DOCD: CPT | Performed by: INTERNAL MEDICINE

## 2021-07-21 PROCEDURE — G8427 DOCREV CUR MEDS BY ELIG CLIN: HCPCS | Performed by: INTERNAL MEDICINE

## 2021-07-21 PROCEDURE — G8417 CALC BMI ABV UP PARAM F/U: HCPCS | Performed by: INTERNAL MEDICINE

## 2021-07-21 PROCEDURE — 4040F PNEUMOC VAC/ADMIN/RCVD: CPT | Performed by: INTERNAL MEDICINE

## 2021-07-21 PROCEDURE — 99213 OFFICE O/P EST LOW 20 MIN: CPT | Performed by: INTERNAL MEDICINE

## 2021-07-21 PROCEDURE — 1036F TOBACCO NON-USER: CPT | Performed by: INTERNAL MEDICINE

## 2021-07-21 RX ORDER — IPRATROPIUM BROMIDE 21 UG/1
2 SPRAY, METERED NASAL EVERY 12 HOURS
COMMUNITY

## 2021-07-21 ASSESSMENT — ENCOUNTER SYMPTOMS
ABDOMINAL DISTENTION: 0
SHORTNESS OF BREATH: 0
APNEA: 0
CHOKING: 0
COUGH: 0
CHEST TIGHTNESS: 0

## 2021-07-21 NOTE — PROGRESS NOTES
Subjective:      Patient ID: Hemalatha Kenn is a 64 y.o. male. Coronary Artery Disease  Pertinent negatives include no chest pain, chest tightness, dizziness, leg swelling, palpitations or shortness of breath. His past medical history is significant for CHF. Hypertension  Pertinent negatives include no chest pain, palpitations or shortness of breath. Shortness of Breath  Pertinent negatives include no chest pain or leg swelling. His past medical history is significant for CAD. Congestive Heart Failure  Pertinent negatives include no chest pain, fatigue, palpitations or shortness of breath. His past medical history is significant for CAD. Here for follow up afib/sss/pacer/htn/cad/PTCA/CHF. Feeling good. Exercising without problem. Works out with  2x / wk. No pnd or orthopne. No edema. Wt down 30 lbs. BP good. No chest pain. No sob. Intermittent but minimal  afib. No syncope. Past Medical History:   Diagnosis Date    Aortic stenosis 2015 Feb 2019 mean gradient 35mg Hg    Atrial fibrillation ([facility] 07/2012    Saw cardiologist for palps. Event recorder showed AF about 5% of the time. Warfarin started. Became persistent in March 2019 when admitted with pneumonia.  CAD (coronary artery disease), native coronary artery Dec 18, 2007    Presented with palpitation to ER. Slight inc. in S. troponin. Cor. angio showed 90% L. circ. Stent placed. (Dr Ben Saldivar.  Diabetes mellitus ([facility] [address] [facility] 1992    HbA1C in Jennifer '15 was 6.9 on metformin and glipizide.  Diastolic congestive heart failure ([facility] [address] 08/2006    presented with wheeze, [id number]. cardiac cath to r/o ischemia. Diffuse CAD no critical stenoses. LVEF 55% LVEDP 31.    Essential hypertension since 1980    Difficult to control with need for minoxidil in addition to ARB,CCB,BB and diuretic.  Gout     Podagra intermittently over the years. Uric acid 9.4 in 2011.  Allopurinol started (with colchicine for 3 months)    Hypothyroid March 2011 with Friends and Family:     Attends [employer]az[employer] Services:     Active Member of Clubs or Organizations:     Attends Club or Organization Meetings:     Marital Status:    Intimate Partner Violence:     Fear of Current or Ex-Partner:     Emotionally Abused:     Physically Abused:     Sexually Abused:        FH reviewed, denies FH cardiac issues. Vitals:    07/21/21 0957   BP: 110/70   Pulse: 58       Wt 167      Review of Systems   Constitutional: Negative for activity change and fatigue. Respiratory: Negative for apnea, cough, choking, chest tightness and shortness of breath. Cardiovascular: Negative for chest pain, palpitations and leg swelling. No PND or orthopnea. No tachycardia. Gastrointestinal: Negative for abdominal distention. Musculoskeletal: Negative for myalgias. Neurological: Negative for dizziness, syncope and light-headedness. Psychiatric/Behavioral: Negative for agitation, behavioral problems and confusion. All other systems reviewed negative as done. Objective:   Physical Exam  Constitutional:       General: He is not in acute distress. Appearance: He is well-developed. HENT:      Head: Normocephalic and atraumatic. Eyes:      General:         Right eye: No discharge. Left eye: No discharge. Conjunctiva/sclera: Conjunctivae normal.   Neck:      Vascular: No JVD. Cardiovascular:      Rate and Rhythm: Normal rate and regular rhythm. Pulses:           Radial pulses are 2+ on the right side and 2+ on the left side. Heart sounds: S1 normal and S2 normal. Murmur heard. No gallop. Comments: 2/6 syst M  Pulmonary:      Effort: Pulmonary effort is normal.      Breath sounds: Normal breath sounds. No wheezing or rales. Abdominal:      General: Bowel sounds are normal.      Palpations: Abdomen is soft. Tenderness: There is no abdominal tenderness. Musculoskeletal:         General: Normal range of motion.       Cervical back: Normal range of motion and neck supple. Skin:     General: Skin is warm and dry. Neurological:      Mental Status: He is alert and oriented to person, place, and time. Psychiatric:         Behavior: Behavior normal.         Thought Content: Thought content normal.         Assessment:       Diagnosis Orders   1. CAD in native artery     2. History of PTCA     3. Chronic diastolic congestive heart failure ([address]     4. SSS (sick sinus syndrome) ([address]     5. Pacemaker     6. Nonrheumatic aortic valve stenosis     7. Paroxysmal atrial fibrillation (HCC)     8. Essential hypertension             Plan:      CV stable. Rhythm stable. BP very good. Off BP meds    Lost 40 lbs. EP following pacemaker/afib. Rhythm stable. Exercising 2x per wik with . Wt stable. On AC, no bleeding issues. Recommended diet, exercise and wt loss. Reviewed previous records and testing including myoview 7/20 and echo 5/20. No changes. Continue to monitor. Follow up 3 months.

## 2021-08-10 ENCOUNTER — NURSE ONLY (OUTPATIENT)
Dept: CARDIOLOGY CLINIC | Age: 76
End: 2021-08-10

## 2021-08-10 DIAGNOSIS — Z95.0 CARDIAC PACEMAKER IN SITU: ICD-10-CM

## 2021-08-10 DIAGNOSIS — R00.1 BRADYCARDIA: ICD-10-CM

## 2021-08-10 PROCEDURE — 93294 REM INTERROG EVL PM/LDLS PM: CPT | Performed by: INTERNAL MEDICINE

## 2021-08-10 PROCEDURE — 93296 REM INTERROG EVL PM/IDS: CPT | Performed by: INTERNAL MEDICINE

## 2021-08-11 NOTE — PROGRESS NOTES
We received remote transmission from patient's dual chamber pacemaker monitor at home. Transmission shows normal sensing and pacing function. No new arrhythmias/events recorded. Ap 88.5% (no RR, programmed DDD, HRH blunted)   <0.1%    EP physician will review. See interrogation under cardiology tab in the [address] [place] [address] [address] field for more details.

## 2021-10-10 ENCOUNTER — HOSPITAL ENCOUNTER (EMERGENCY)
Age: 76
Discharge: HOME | End: 2021-10-11
Attending: EMERGENCY MEDICINE
Payer: MEDICARE

## 2021-10-10 ENCOUNTER — APPOINTMENT (OUTPATIENT)
Dept: GENERAL RADIOLOGY | Age: 76
End: 2021-10-10
Payer: MEDICARE

## 2021-10-10 DIAGNOSIS — R07.89 CHEST TIGHTNESS: Primary | ICD-10-CM

## 2021-10-10 DIAGNOSIS — R06.89 HYPERCARBIA: ICD-10-CM

## 2021-10-10 LAB
ANION GAP SERPL CALCULATED.3IONS-SCNC: 17 MMOL/L (ref 3–16)
BASE EXCESS VENOUS: -3.9 MMOL/L (ref -2–3)
BASOPHILS ABSOLUTE: 0.1 K/UL (ref 0–0.2)
BASOPHILS RELATIVE PERCENT: 0.9 %
BUN BLDV-MCNC: 43 MG/DL (ref 7–20)
CALCIUM SERPL-MCNC: 9.1 MG/DL (ref 8.3–10.6)
CARBOXYHEMOGLOBIN: 0.1 % (ref 0–1.5)
CHLORIDE BLD-SCNC: 101 MMOL/L (ref 99–110)
CO2: 17 MMOL/L (ref 21–32)
CREAT SERPL-MCNC: 1.2 MG/DL (ref 0.8–1.3)
EOSINOPHILS ABSOLUTE: 0.3 K/UL (ref 0–0.6)
EOSINOPHILS RELATIVE PERCENT: 4.2 %
GFR AFRICAN AMERICAN: >60
GFR NON-AFRICAN AMERICAN: 58
GLUCOSE BLD-MCNC: 116 MG/DL (ref 70–99)
HCO3 VENOUS: 23.4 MMOL/L (ref 24–28)
HCT VFR BLD CALC: 40.5 % (ref 40.5–52.5)
HEMOGLOBIN, VEN, REDUCED: 66.9 %
HEMOGLOBIN: 13.5 G/DL (ref 13.5–17.5)
LACTIC ACID: 1.4 MMOL/L (ref 0.4–2)
LYMPHOCYTES ABSOLUTE: 1.5 K/UL (ref 1–5.1)
LYMPHOCYTES RELATIVE PERCENT: 22.8 %
MCH RBC QN AUTO: 32.1 PG (ref 26–34)
MCHC RBC AUTO-ENTMCNC: 33.2 G/DL (ref 31–36)
MCV RBC AUTO: 96.7 FL (ref 80–100)
METHEMOGLOBIN VENOUS: 0.5 % (ref 0–1.5)
MONOCYTES ABSOLUTE: 0.7 K/UL (ref 0–1.3)
MONOCYTES RELATIVE PERCENT: 11.4 %
NEUTROPHILS ABSOLUTE: 3.9 K/UL (ref 1.7–7.7)
NEUTROPHILS RELATIVE PERCENT: 60.7 %
O2 SAT, VEN: 33 %
PCO2, VEN: 50.7 MMHG (ref 41–51)
PDW BLD-RTO: 15.9 % (ref 12.4–15.4)
PH VENOUS: 7.27 (ref 7.35–7.45)
PLATELET # BLD: 252 K/UL (ref 135–450)
PMV BLD AUTO: 10.3 FL (ref 5–10.5)
PO2, VEN: <30 MMHG (ref 25–40)
POTASSIUM REFLEX MAGNESIUM: 5.1 MMOL/L (ref 3.5–5.1)
PRO-BNP: 551 PG/ML (ref 0–449)
RBC # BLD: 4.19 M/UL (ref 4.2–5.9)
SODIUM BLD-SCNC: 135 MMOL/L (ref 136–145)
TCO2 CALC VENOUS: 25 MMOL/L
TROPONIN: <0.01 NG/ML
WBC # BLD: 6.4 K/UL (ref 4–11)

## 2021-10-10 PROCEDURE — 84484 ASSAY OF TROPONIN QUANT: CPT

## 2021-10-10 PROCEDURE — 36415 COLL VENOUS BLD VENIPUNCTURE: CPT

## 2021-10-10 PROCEDURE — 80048 BASIC METABOLIC PNL TOTAL CA: CPT

## 2021-10-10 PROCEDURE — 83605 ASSAY OF LACTIC ACID: CPT

## 2021-10-10 PROCEDURE — 99284 EMERGENCY DEPT VISIT MOD MDM: CPT

## 2021-10-10 PROCEDURE — 82803 BLOOD GASES ANY COMBINATION: CPT

## 2021-10-10 PROCEDURE — 83880 ASSAY OF NATRIURETIC PEPTIDE: CPT

## 2021-10-10 PROCEDURE — 85025 COMPLETE CBC W/AUTO DIFF WBC: CPT

## 2021-10-10 PROCEDURE — 71046 X-RAY EXAM CHEST 2 VIEWS: CPT

## 2021-10-10 RX ORDER — IPRATROPIUM BROMIDE AND ALBUTEROL SULFATE 2.5; .5 MG/3ML; MG/3ML
1 SOLUTION RESPIRATORY (INHALATION) ONCE
Status: COMPLETED | OUTPATIENT
Start: 2021-10-11 | End: 2021-10-11

## 2021-10-10 ASSESSMENT — PAIN DESCRIPTION - DESCRIPTORS: DESCRIPTORS: PRESSURE

## 2021-10-10 ASSESSMENT — PAIN DESCRIPTION - ORIENTATION: ORIENTATION: ANTERIOR;UPPER;MID

## 2021-10-10 ASSESSMENT — PAIN DESCRIPTION - ONSET: ONSET: PROGRESSIVE

## 2021-10-10 ASSESSMENT — PAIN DESCRIPTION - FREQUENCY: FREQUENCY: INTERMITTENT

## 2021-10-10 ASSESSMENT — PAIN DESCRIPTION - PAIN TYPE: TYPE: ACUTE PAIN

## 2021-10-10 ASSESSMENT — PAIN DESCRIPTION - LOCATION: LOCATION: CHEST

## 2021-10-10 ASSESSMENT — PAIN SCALES - GENERAL: PAINLEVEL_OUTOF10: 5

## 2021-10-11 VITALS
HEART RATE: 60 BPM | TEMPERATURE: 98.5 F | OXYGEN SATURATION: 99 % | RESPIRATION RATE: 17 BRPM | DIASTOLIC BLOOD PRESSURE: 66 MMHG | SYSTOLIC BLOOD PRESSURE: 129 MMHG

## 2021-10-11 LAB — TROPONIN: <0.01 NG/ML

## 2021-10-11 PROCEDURE — 94760 N-INVAS EAR/PLS OXIMETRY 1: CPT

## 2021-10-11 PROCEDURE — 94640 AIRWAY INHALATION TREATMENT: CPT

## 2021-10-11 PROCEDURE — 36415 COLL VENOUS BLD VENIPUNCTURE: CPT

## 2021-10-11 PROCEDURE — 6370000000 HC RX 637 (ALT 250 FOR IP): Performed by: EMERGENCY MEDICINE

## 2021-10-11 PROCEDURE — 84484 ASSAY OF TROPONIN QUANT: CPT

## 2021-10-11 RX ORDER — ALBUTEROL SULFATE 90 UG/1
2 AEROSOL, METERED RESPIRATORY (INHALATION) EVERY 4 HOURS PRN
Qty: 18 G | Refills: 0 | Status: SHIPPED | OUTPATIENT
Start: 2021-10-11

## 2021-10-11 RX ADMIN — IPRATROPIUM BROMIDE AND ALBUTEROL SULFATE 1 AMPULE: .5; 3 SOLUTION RESPIRATORY (INHALATION) at 00:14

## 2021-10-11 ASSESSMENT — ENCOUNTER SYMPTOMS
CHEST TIGHTNESS: 1
ABDOMINAL PAIN: 0
NAUSEA: 0
EYES NEGATIVE: 1
SHORTNESS OF BREATH: 0
COUGH: 1
DIARRHEA: 0
SORE THROAT: 0
GASTROINTESTINAL NEGATIVE: 1
VOMITING: 0
RHINORRHEA: 1

## 2021-10-11 NOTE — ED PROVIDER NOTES
[address] [facility]          ATTENDING PHYSICIAN NOTE       Date of evaluation: 10/10/2021    Chief Complaint     Chest Pain (c/o chest pressure, upper mid chest x one week off and on) and Other (few episodes of lightheaded feeling, just not overall feeling well off and on)      History of Present Illness     Terese Fernandez is a 64 y.o. male with a history of coronary artery disease and paroxysmal atrial fibrillation, as well as multiple other medical comorbidities, who presents to the emergency department with complaints of episodic chest tightness over approximately the last week. Patient resists describing the sensation as pain, but describes it as a tight or pressure-like sensation in the bilateral lateral chest.  Patient states that this occurs either with exertion or at rest, and typically lasts between 20 minutes an hour or 2 before resolving. He states that it has occurred essentially every day for the last week, several times per day. He denies associated shortness of breath. He denies any associated nausea or vomiting. He does state that once or twice over this week he has also felt lightheaded, although not in association with the chest tightness necessarily. He denies any recent fevers or chills. When asked about URI symptoms, the patient states that he has significant allergic rhinitis, and that his symptoms are typical for this time of year. He also has an intermittent cough, which he associates with his allergic symptoms. Patient states that the sensation that he is experiencing is similar to when he has had muscular soreness in his pectoral muscles after working out at the gym, except that his muscular chest wall pain is typically improved by pressing on it, and this sensation is not. Patient states that he has a diagnosis of COPD, although it has not bothered him for some time. He does not use a daily maintenance inhaler.   He has a an albuterol inhaler at home, but has not used it in quite some time. Review of Systems     Review of Systems   Constitutional: Negative for activity change, appetite change, chills, fatigue and fever. HENT: Positive for congestion and rhinorrhea. Negative for sore throat. Baseline, related to allergic rhinitis. Eyes: Negative. Negative for visual disturbance. Respiratory: Positive for cough and chest tightness. Negative for shortness of breath. Cardiovascular: Negative for chest pain, palpitations and leg swelling. Gastrointestinal: Negative. Negative for abdominal pain, diarrhea, nausea and vomiting. Genitourinary: Negative. Negative for difficulty urinating and dysuria. Musculoskeletal: Negative. Skin: Negative. Neurological: Positive for light-headedness. Negative for dizziness, syncope, weakness, numbness and headaches. Psychiatric/Behavioral: Negative. Past Medical, Surgical, Family, and Social History     He has a past medical history of Aortic stenosis, Atrial fibrillation ([address] CAD (coronary artery disease), native coronary artery, Diabetes mellitus ([address] Diastolic congestive heart failure ([address] Essential hypertension, Gout, Hypothyroid, Left carotid bruit, Obstructive sleep apnea, Peripheral neuropathy, Pneumonia, and Sick sinus syndrome ([address] He has a past surgical history that includes Diagnostic Cardiac Cath Lab Procedure (12/17/07); Thyroidectomy (2011); Cholecystectomy, open (1993); Total knee arthroplasty (Right, 3/12/14); Pacemaker insertion (02/2017); and Coronary angioplasty (12/17/07). His family history includes Heart Attack in his maternal grandfather; Heart Defect in his maternal grandfather and mother; Heart Disease in his father; Heart Surgery in his father; Pacemaker in his mother. He reports that he has never smoked. He has never used smokeless tobacco. He reports that he does not drink alcohol and does not use drugs.     Medications     Previous Medications ALLOPURINOL (ZYLOPRIM) 300 MG TABLET    Take 300 mg by mouth nightly     ASPIRIN 81 MG TABLET    Take 81 mg by mouth daily. ATORVASTATIN (LIPITOR) 40 MG TABLET    Take 40 mg by mouth nightly     FUROSEMIDE (LASIX) 20 MG TABLET    Take 1 tablet by mouth daily    GEMFIBROZIL (LOPID) 600 MG TABLET    Take 600 mg by mouth 2 times daily (before meals). GLIPIZIDE (GLUCOTROL) 5 MG TABLET    Take 2.5 mg by mouth daily     IPRATROPIUM (ATROVENT) 0.03 % NASAL SPRAY    2 sprays by Each Nostril route every 12 hours    LEVOTHYROXINE (SYNTHROID) 150 MCG TABLET    Take 150 mcg by mouth daily. METFORMIN (GLUCOPHAGE) 1000 MG TABLET    Take 1,000 mg by mouth 2 times daily (with meals). MULTIPLE VITAMINS-MINERALS (PRESERVISION AREDS PO)    Take 2 capsules by mouth 2 times daily    OXYBUTYNIN (DITROPAN) 5 MG/5ML SYRUP    Take 5 mg by mouth as needed     SOTALOL (BETAPACE) 80 MG TABLET    TAKE 1 TABLET BY MOUTH 2 TIMES DAILY    SPIRONOLACTONE (ALDACTONE) 25 MG TABLET    TAKE 1 TABLET BY MOUTH DAILY    TAMSULOSIN (FLOMAX) 0.4 MG CAPSULE    Take 0.4 mg by mouth nightly    TRAZODONE (DESYREL) 100 MG TABLET    Take 100 mg by mouth nightly    WARFARIN (COUMADIN) 5 MG TABLET    TAKE AS AL VARYING SCHEDULE       Allergies     He is allergic to erythromycin. Physical Exam     INITIAL VITALS: BP: (!) 172/108, Temp: 97.6 °F (36.4 °C), Pulse: 68, Resp: 18, SpO2: 100 %     General: Well appearing. Pleasantly conversational, and in NAD. HEENT: Pupils are equal, round, and reactive to light. Extraocular muscles are intact. Conjunctivae are clear and moist. No redness or drainage from the eyes. Neck: Supple, with full range of motion. Chest: Not tender to palpation. Cardiovascular: Normal S1-S2. 2+ radial pulses bilaterally. Respiratory: Unlabored breathing with equal chest rise and fall. Generally good air entry bilaterally. No focal crackles noted. No wheezing noted.     Abdomen: Soft and nontender, without guarding or rebound tenderness. No masses or hepatosplenomegaly. Skin: Warm and dry, without rashes or ecchymoses, lacerations or abrasions. Neuro: Alert and oriented x3. No focal neurologic deficits are noted. Extremities: Warm and well-perfused, without clubbing, cyanosis, or edema. The patient moves all extremities equally. Psych: The patient's mood and affect are generally within normal limits for their presentation. Diagnostic Results     EKG   Normal sinus rhythm with a ventricular rate of 69 bpm.  Normal axis. First-degree AV block with AZ interval 238 ms. QRS duration 92 ms. QTc 452 ms. There are no ST segment or T wave abnormalities noted. This is very similar to a prior EKG dated July 30, 2020, and first-degree AV block was present at that time. RADIOLOGY:  XR CHEST (2 VW)   Final Result      No evidence for acute cardiopulmonary disease.                 LABS:   Results for orders placed or performed during the hospital encounter of 10/10/21   CBC auto differential   Result Value Ref Range    WBC 6.4 4.0 - 11.0 K/uL    RBC 4.19 (L) 4.20 - 5.90 M/uL    Hemoglobin 13.5 13.5 - 17.5 g/dL    Hematocrit 40.5 40.5 - 52.5 %    MCV 96.7 80.0 - 100.0 fL    MCH 32.1 26.0 - 34.0 pg    MCHC 33.2 31.0 - 36.0 g/dL    RDW 15.9 (H) 12.4 - 15.4 %    Platelets 005 710 - 515 K/uL    MPV 10.3 5.0 - 10.5 fL    Neutrophils % 60.7 %    Lymphocytes % 22.8 %    Monocytes % 11.4 %    Eosinophils % 4.2 %    Basophils % 0.9 %    Neutrophils Absolute 3.9 1.7 - 7.7 K/uL    Lymphocytes Absolute 1.5 1.0 - 5.1 K/uL    Monocytes Absolute 0.7 0.0 - 1.3 K/uL    Eosinophils Absolute 0.3 0.0 - 0.6 K/uL    Basophils Absolute 0.1 0.0 - 0.2 K/uL   Basic Metabolic Panel w/ Reflex to MG   Result Value Ref Range    Sodium 135 (L) 136 - 145 mmol/L    Potassium reflex Magnesium 5.1 3.5 - 5.1 mmol/L    Chloride 101 99 - 110 mmol/L    CO2 17 (L) 21 - 32 mmol/L    Anion Gap 17 (H) 3 - 16    Glucose 116 (H) 70 - 99 mg/dL    BUN 43 (H) 7 - 20 mg/dL    CREATININE 1.2 0.8 - 1.3 mg/dL    GFR Non-African American 58 (A) >60    GFR African American >60 >60    Calcium 9.1 8.3 - 10.6 mg/dL   Brain Natriuretic Peptide   Result Value Ref Range    Pro- (H) 0 - 449 pg/mL   Troponin (lab)   Result Value Ref Range    Troponin <0.01 <0.01 ng/mL   Blood gas, venous (Lab)   Result Value Ref Range    pH, Ivan 7.274 (L) 7.350 - 7.450    pCO2, Ivan 50.7 41.0 - 51.0 mmHg    pO2, Ivan <30.0 25 - 40 mmHg    HCO3, Venous 23.4 (L) 24.0 - 28.0 mmol/L    Base Excess, Ivan -3.9 (L) -2.0 - 3.0 mmol/L    O2 Sat, Ivan 33 Not established %    Carboxyhemoglobin 0.1 0.0 - 1.5 %    MetHgb, Ivan 0.5 0.0 - 1.5 %    TC02 (Calc), Ivan 25 mmol/L    Hemoglobin, Ivan, Reduced 66.90 %   Lactate, plasma   Result Value Ref Range    Lactic Acid 1.4 0.4 - 2.0 mmol/L   Troponin (lab)   Result Value Ref Range    Troponin <0.01 <0.01 ng/mL         RECENT VITALS:  BP: (!) 169/82, Temp: 98.5 °F (36.9 °C), Pulse: 65, Resp: 16, SpO2: 100 %     Procedures       ED Course     Nursing Notes, Past Medical Hx, Past Surgical Hx, Social Hx, Allergies, and Family Hx were reviewed. The patient was given the following medications:  Orders Placed This Encounter   Medications    ipratropium-albuterol (DUONEB) nebulizer solution 1 ampule     Order Specific Question:   Initiate RT Bronchodilator Protocol     Answer: Yes    albuterol sulfate HFA (PROVENTIL HFA) 108 (90 Base) MCG/ACT inhaler     Sig: Inhale 2 puffs into the lungs every 4 hours as needed for Wheezing or Shortness of Breath (Space out to every 6 hours as symptoms improve) Space out to every 6 hours as symptoms improve.      Dispense:  18 g     Refill:  0       CONSULTS:  None    MEDICAL DECISION MAKING / ASSESSMENT / Tiffanie Taz is a 64 y.o. male with a notable past cardiac history, followed by Dr. Beverly Childs of cardiology, with most recent stress test a little over a year ago reassuring, who presents to the emergency department with a 1 week history of intermittent episodes of chest tightness which are not exertional, and not particularly relieved by rest, not associated with shortness of breath, nausea or vomiting. His EKG shows no ischemic changes, troponin is negative x2. The patient's laboratory evaluation otherwise was remarkable for a mild respiratory acidosis. His EKG is unremarkable and he does not have significant respiratory infectious symptoms, although he does have significant environmental allergies, which lead to somewhat chronic rhinitis symptoms and occasional cough. Given the hypercarbia, the patient was given a DuoNeb treatment, after which he states that he felt a sense of relief of tightness in his chest, and felt as though he was more free to take a deep breath. Overall, the patient's episodic sense of bilateral chest tightness, which is nonexertional, with a reassuring cardiac work-up in the emergency department, and improvement after administration of a DuoNeb, associated with hypercarbia, is more consistent with obstructive pulmonary process then with acute coronary syndrome. The patient has follow-up already scheduled with his cardiologist, and was encouraged to call his cardiologist office to touch base about this ED visit. He is also encouraged to call his primary care provider in the morning, to discuss his ER visit, and arrange further follow-up appointment. If the patient is having some worsening of his pulmonary process, he may need further long-term management, perhaps with a maintenance inhaler. The patient was given strict return precautions for any worsening of his chest discomfort or shortness of breath, or any chest discomfort that is worse with exertion and better with rest, or otherwise more concerning for cardiac etiology. Clinical Impression     1. Chest tightness    2.  Hypercarbia        Disposition     PATIENT REFERRED TO:  Law Estrada MD  [facility], [facility] [address] [facility]  [phone]    Call in 1 day  to discuss your ER visit, and arrange a follow-up appointment    Bismark Andujar MD  [facility], [address] [facility]  [phone]    Call in 1 day  to discuss your ER visit, and arrange a follow-up appointment      DISCHARGE MEDICATIONS:  New Prescriptions    ALBUTEROL SULFATE HFA (PROVENTIL HFA) 108 (90 BASE) MCG/ACT INHALER    Inhale 2 puffs into the lungs every 4 hours as needed for Wheezing or Shortness of Breath (Space out to every 6 hours as symptoms improve) Space out to every 6 hours as symptoms improve. DISPOSITION Decision To Discharge    (Please note that portions of this note were completed with voice recognition software.   Efforts were made to edit the dictations but occasionally words are mis-transcribed.)     Neto Castellon MD  10/11/21 0814

## 2021-10-22 ENCOUNTER — OFFICE VISIT (OUTPATIENT)
Dept: CARDIOLOGY CLINIC | Age: 76
End: 2021-10-22
Payer: MEDICARE

## 2021-10-22 VITALS
SYSTOLIC BLOOD PRESSURE: 110 MMHG | BODY MASS INDEX: 25.85 KG/M2 | HEART RATE: 60 BPM | WEIGHT: 170 LBS | DIASTOLIC BLOOD PRESSURE: 70 MMHG

## 2021-10-22 DIAGNOSIS — I48.0 PAROXYSMAL ATRIAL FIBRILLATION (HCC): ICD-10-CM

## 2021-10-22 DIAGNOSIS — I35.0 NONRHEUMATIC AORTIC VALVE STENOSIS: ICD-10-CM

## 2021-10-22 DIAGNOSIS — Z95.0 PACEMAKER: ICD-10-CM

## 2021-10-22 DIAGNOSIS — Z98.61 HISTORY OF PTCA: ICD-10-CM

## 2021-10-22 DIAGNOSIS — I50.32 CHRONIC DIASTOLIC CONGESTIVE HEART FAILURE (HCC): ICD-10-CM

## 2021-10-22 DIAGNOSIS — I25.10 CAD IN NATIVE ARTERY: Primary | ICD-10-CM

## 2021-10-22 DIAGNOSIS — I49.5 SSS (SICK SINUS SYNDROME) (HCC): ICD-10-CM

## 2021-10-22 DIAGNOSIS — I10 ESSENTIAL HYPERTENSION: ICD-10-CM

## 2021-10-22 PROCEDURE — G8484 FLU IMMUNIZE NO ADMIN: HCPCS | Performed by: INTERNAL MEDICINE

## 2021-10-22 PROCEDURE — 4040F PNEUMOC VAC/ADMIN/RCVD: CPT | Performed by: INTERNAL MEDICINE

## 2021-10-22 PROCEDURE — G8417 CALC BMI ABV UP PARAM F/U: HCPCS | Performed by: INTERNAL MEDICINE

## 2021-10-22 PROCEDURE — 1123F ACP DISCUSS/DSCN MKR DOCD: CPT | Performed by: INTERNAL MEDICINE

## 2021-10-22 PROCEDURE — 99214 OFFICE O/P EST MOD 30 MIN: CPT | Performed by: INTERNAL MEDICINE

## 2021-10-22 PROCEDURE — 1036F TOBACCO NON-USER: CPT | Performed by: INTERNAL MEDICINE

## 2021-10-22 PROCEDURE — G8428 CUR MEDS NOT DOCUMENT: HCPCS | Performed by: INTERNAL MEDICINE

## 2021-10-22 ASSESSMENT — ENCOUNTER SYMPTOMS
CHEST TIGHTNESS: 0
SHORTNESS OF BREATH: 0
CHOKING: 0
ABDOMINAL DISTENTION: 0
COUGH: 0
APNEA: 0

## 2021-10-22 NOTE — PROGRESS NOTES
Subjective:      Patient ID: Emilia Moreno is a 64 y.o. male. Coronary Artery Disease  Pertinent negatives include no chest pain, chest tightness, dizziness, leg swelling, palpitations or shortness of breath. His past medical history is significant for CHF. Hypertension  Pertinent negatives include no chest pain, palpitations or shortness of breath. Shortness of Breath  Pertinent negatives include no chest pain or leg swelling. His past medical history is significant for CAD. Congestive Heart Failure  Pertinent negatives include no chest pain, fatigue, palpitations or shortness of breath. His past medical history is significant for CAD. Here for follow up afib/sss/pacer/htn/cad/PTCA/CHF. Feeling good. Exercising without problem. Works out with  2x / wk. No pnd or orthopne. No edema. Wt down 30 lbs. BP good. No chest pain. No sob. Intermittent but minimal  afib. Rhythm good. No syncope. BP low at times  90s. Usually in am     Past Medical History:   Diagnosis Date    Aortic stenosis 2015 Feb 2019 mean gradient 35mg Hg    At[facility] 07/2012    Saw cardiologist for palps. Event recorder showed AF about 5% of the time. Warfarin started. Became persistent in March 2019 when admitted with pneumonia.  CAD (coronary artery disease), native coronary artery Dec 18, 2007    Presented with palpitation to ER. Slight inc. in S. troponin. Cor. angio showed 90% L. circ. Stent placed. (Dr Matias Lowers.  Diabetes mellitus ([facility] 1992    HbA1C in Jennifer '15 was 6.9 on metformin and glipizide.  Diastolic congestive heart failure ([facility] 08/2006    presented with wheeze, [id number]. cardiac cath to r/o ischemia. Diffuse CAD no critical stenoses. LVEF 55% LVEDP 31.    Essential hypertension since 1980    Difficult to control with need for minoxidil in addition to ARB,CCB,BB and diuretic.  Gout     Podagra intermittently over the years. Uric acid 9.4 in 2011.  Allopurinol started (with colchicine for 3 months)    Hypothyroid March 2011    Total thyroidectomy for indeterminate nodule. Proved benign.  Left carotid bruit Oct 2014    Discovered at routine exam. Carotid Doppler showed bilateral stenosis <50%    Obstructive sleep apnea 1997    as of 2019 still using CPAP    Peripheral neuropathy     2/2 DM.  Pneumonia 89/1978    complicated by hypotension and overdiuresis leading to REYNALDO. S.Cr 1.7 on 3/9/19. Improved to 1.3 on 3/11/19    Sick sinus syndrome (HCC) 02/16/2017    PIP placed because of 7 sec pauses on sotalol. Past Surgical History:   Procedure Laterality Date    CHOLECYSTECTOMY, OPEN  46    CORONARY ANGIOPLASTY  12/17/07    has stent per pt    DIAGNOSTIC CARDIAC CATH LAB PROCEDURE  12/17/07    PACEMAKER INSERTION  02/2017    Sick sinus sundrome with 7 sec pauses on sotalol.  THYROIDECTOMY  2011    Benign    TOTAL KNEE ARTHROPLASTY Right 3/12/14     Social History     Socioeconomic History    Marital status: Single     Spouse name: Not on file    Number of children: Not on file    Years of education: Not on file    Highest education level: Not on file   Occupational History    Not on file   Tobacco Use    Smoking status: Never Smoker    Smokeless tobacco: Never Used   Substance and Sexual Activity    Alcohol use: No    Drug use: No    Sexual activity: Not Currently     Comment: Single   Other Topics Concern    Not on file   Social History Narrative    Not on file     Social Determinants of Health     Financial Resource Strain:     Difficulty of Paying Living Expenses:    Food Insecurity:     Worried About Running Out of Food in the Last Year:     Ran Out of Food in the Last Year:    Transportation Needs:     Lack of Transportation (Medical):      Lack of Transportation (Non-Medical):    Physical Activity:     Days of Exercise per Week:     Minutes of Exercise per Session:    Stress:     Feeling of Stress :    Social Connections:     Frequency of Communication with Friends and Family:     Frequency of Social Gatherings with Friends and Family:     Attends Christian Services:     Active Member of Clubs or Organizations:     Attends Club or Organization Meetings:     Marital Status:    Intimate Partner Violence:     Fear of Current or Ex-Partner:     Emotionally Abused:     Physically Abused:     Sexually Abused:        FH reviewed, denies FH cardiac issues. Vitals:    10/22/21 1007   BP: 110/70   Pulse: 60       Wt 170      Review of Systems   Constitutional: Negative for activity change and fatigue. Respiratory: Negative for apnea, cough, choking, chest tightness and shortness of breath. Cardiovascular: Negative for chest pain, palpitations and leg swelling. No PND or orthopnea. No tachycardia. Gastrointestinal: Negative for abdominal distention. Musculoskeletal: Negative for myalgias. Neurological: Negative for dizziness, syncope and light-headedness. Psychiatric/Behavioral: Negative for agitation, behavioral problems and confusion. All other systems reviewed negative as done. Objective:   Physical Exam  Constitutional:       General: He is not in acute distress. Appearance: He is well-developed. HENT:      Head: Normocephalic and atraumatic. Eyes:      General:         Right eye: No discharge. Left eye: No discharge. Conjunctiva/sclera: Conjunctivae normal.   Neck:      Vascular: No JVD. Cardiovascular:      Rate and Rhythm: Normal rate and regular rhythm. Pulses:           Radial pulses are 2+ on the right side and 2+ on the left side. Heart sounds: S1 normal and S2 normal. Murmur heard. No gallop. Comments: 2/6 syst M  Pulmonary:      Effort: Pulmonary effort is normal.      Breath sounds: Normal breath sounds. No wheezing or rales. Abdominal:      General: Bowel sounds are normal.      Palpations: Abdomen is soft. Tenderness: There is no abdominal tenderness. Musculoskeletal:         General: Normal range of motion. Cervical back: Normal range of motion and neck supple. Skin:     General: Skin is warm and dry. Neurological:      Mental Status: He is alert and oriented to person, place, and time. Psychiatric:         Behavior: Behavior normal.         Thought Content: Thought content normal.         Assessment:       Diagnosis Orders   1. CAD in native artery     2. History of PTCA     3. Chronic diastolic congestive heart failure ([address]     4. SSS (sick sinus syndrome) ([address]     5. Pacemaker     6. Nonrheumatic aortic valve stenosis     7. Paroxysmal atrial fibrillation (HCC)     8. Essential hypertension             Plan:      CV stable. Rhythm stable. BP very good. Off BP meds   Hold lasix on days bp 90s. EP following pacemaker/afib. Rhythm stable. Exercising 2x per wik with . Wt stable. Will continue sotalol for afib. On AC, no bleeding issues. Will continue Aldactone,  Recommended diet, exercise and wt loss. Reviewed previous records and testing including myoview 7/20 and echo 5/20. No changes. Continue to monitor. Follow up 3 months. Echo from next visit.

## 2021-11-09 ENCOUNTER — NURSE ONLY (OUTPATIENT)
Dept: CARDIOLOGY CLINIC | Age: 76
End: 2021-11-09
Payer: MEDICARE

## 2021-11-09 DIAGNOSIS — I49.5 SICK SINUS SYNDROME (HCC): ICD-10-CM

## 2021-11-09 DIAGNOSIS — Z95.0 CARDIAC PACEMAKER IN SITU: ICD-10-CM

## 2021-11-09 DIAGNOSIS — R00.1 BRADYCARDIA: ICD-10-CM

## 2021-11-10 NOTE — PROGRESS NOTES
We received remote transmission from patient's dual chamber pacemaker monitor at home. Transmission shows normal sensing and pacing function. No new arrhythmias/events recorded. Ap 80.2% (no rate response, HRH blunted)   0.2%    EP physician will review. See interrogation under cardiology tab in the [address] [place] [address] [address] field for more details. Will continue to monitor remotely.

## 2021-11-13 PROCEDURE — 93296 REM INTERROG EVL PM/IDS: CPT | Performed by: INTERNAL MEDICINE

## 2021-11-13 PROCEDURE — 93294 REM INTERROG EVL PM/LDLS PM: CPT | Performed by: INTERNAL MEDICINE

## 2021-12-03 NOTE — PROGRESS NOTES
Medications on File Prior to Visit   Medication Sig Dispense Refill    albuterol sulfate HFA (PROVENTIL HFA) 108 (90 Base) MCG/ACT inhaler Inhale 2 puffs into the lungs every 4 hours as needed for Wheezing or Shortness of Breath (Space out to every 6 hours as symptoms improve) Space out to every 6 hours as symptoms improve. 18 g 0    Multiple Vitamins-Minerals (PRESERVISION AREDS PO) Take 2 capsules by mouth 2 times daily      ipratropium (ATROVENT) 0.03 % nasal spray 2 sprays by Each Nostril route every 12 hours      furosemide (LASIX) 20 MG tablet Take 1 tablet by mouth daily 90 tablet 5    spironolactone (ALDACTONE) 25 MG tablet TAKE 1 TABLET BY MOUTH DAILY 30 tablet 0    sotalol (BETAPACE) 80 MG tablet TAKE 1 TABLET BY MOUTH 2 TIMES DAILY 180 tablet 3    warfarin (COUMADIN) 5 MG tablet TAKE AS [place] VARYING SCHEDULE (Patient taking differently: Take 5 mg by mouth daily ) 30 tablet 5    oxybutynin (DITROPAN) 5 MG/5ML syrup Take 5 mg by mouth as needed       glipiZIDE (GLUCOTROL) 5 MG tablet Take 2.5 mg by mouth daily       tamsulosin (FLOMAX) 0.4 MG capsule Take 0.4 mg by mouth nightly      traZODone (DESYREL) 100 MG tablet Take 100 mg by mouth nightly      atorvastatin (LIPITOR) 40 MG tablet Take 40 mg by mouth nightly       aspirin 81 MG tablet Take 81 mg by mouth daily.  levothyroxine (SYNTHROID) 150 MCG tablet Take 150 mcg by mouth daily.  allopurinol (ZYLOPRIM) 300 MG tablet Take 300 mg by mouth nightly       gemfibrozil (LOPID) 600 MG tablet Take 600 mg by mouth 2 times daily (before meals).  metformin (GLUCOPHAGE) 1000 MG tablet Take 1,000 mg by mouth 2 times daily (with meals). No current facility-administered medications on file prior to visit. Past Medical History:   Diagnosis Date    Aortic stenosis 2015 Feb 2019 mean gradient 35mg Hg    Atrial fibrillation ([address] 07/2012    Saw cardiologist for palps. Event recorder showed AF about 5% of the time.  Warfarin started. Became persistent in March 2019 when admitted with pneumonia.  CAD (coronary artery disease), native coronary artery Dec 18, 2007    Presented with palpitation to ER. Slight inc. in S. troponin. Cor. angio showed 90% L. circ. Stent placed. (Dr Rossy Angeles.  Diabetes mellitus ([facility] 1992    HbA1C in Jennifer '15 was 6.9 on metformin and glipizide.  Diastolic congestive heart failure ([facility] 08/2006    presented with wheeze, [id number]. cardiac cath to r/o ischemia. Diffuse CAD no critical stenoses. LVEF 55% LVEDP 31.    Essential hypertension since 1980    Difficult to control with need for minoxidil in addition to ARB,CCB,BB and diuretic.  Gout     Podagra intermittently over the years. Uric acid 9.4 in 2011. Allopurinol started (with colchicine for 3 months)    Hypothyroid March 2011    Total thyroidectomy for indeterminate nodule. Proved benign.  Left carotid bruit Oct 2014    Discovered at routine exam. Carotid Doppler showed bilateral stenosis <50%    Obstructive sleep apnea 1997    as of 2019 still using CPAP    Peripheral neuropathy     2/2 DM.  Pneumonia 39/1498    complicated by hypotension and overdiuresis leading to REYNALDO. S.Cr 1.7 on 3/9/19. Improved to 1.3 on 3/11/19    Sick sinus syndrome (HCC) 02/16/2017    PIP placed because of 7 sec pauses on sotalol. Past Surgical History:   Procedure Laterality Date    CHOLECYSTECTOMY, OPEN  46    CORONARY ANGIOPLASTY  12/17/07    has stent per pt    DIAGNOSTIC CARDIAC CATH LAB PROCEDURE  12/17/07    PACEMAKER INSERTION  02/2017    Sick sinus sundrome with 7 sec pauses on sotalol.  THYROIDECTOMY  2011    Benign    TOTAL KNEE ARTHROPLASTY Right 3/12/14       Allergies   Allergen Reactions    Erythromycin Hives and Swelling     Swelling of hands and feet, all-over hives. Social History:  Reviewed. reports that he has never smoked.  He has never used smokeless tobacco. He reports that he does not drink alcohol and does not use drugs. Family History:  Reviewed. family history includes Heart Attack in his maternal grandfather; Heart Defect in his maternal grandfather and mother; Heart Disease in his father; Heart Surgery in his father; Pacemaker in his mother. Review of System:    · Constitutional: No fevers, chills. · Eyes: No visual changes or diplopia. No scleral icterus. · ENT: No Headaches. No mouth sores or sore throat. · Cardiovascular: No for chest pain, No for dyspnea on exertion, No for palpitations or No for loss of consciousness. No cough, hemoptysis, No for pleuritic pain, or phlebitis. · Respiratory: No for cough or wheezing. No hematemesis. · Gastrointestinal: No abdominal pain, blood in stools. · Genitourinary: No dysuria, or hematuria. · Musculoskeletal: No gait disturbance,    · Integumentary: No rash or pruritis. · Neurological: No headache, change in muscle strength, numbness or tingling. · Psychiatric: No anxiety, or depression. · Endocrine: No temperature intolerance. No excessive thirst, fluid intake, or urination. · Hem/Lymph: No abnormal bruising or bleeding, blood clots or swollen lymph nodes. · Allergic/Immunologic: No nasal congestion or hives. Physical Examination:  Vitals:    12/09/21 0939   BP: 116/70   Pulse: 60        Wt Readings from Last 3 Encounters:   12/09/21 168 lb (76.2 kg)   10/22/21 170 lb (77.1 kg)   07/21/21 167 lb (75.8 kg)     · Constitutional: Oriented. No distress. · Head: Normocephalic and atraumatic. · Mouth/Throat: Oropharynx is clear and moist.   · Eyes: Conjunctivae normal. EOM are normal.   · Neck: Neck supple. No rigidity. No JVD present. · Cardiovascular: Normal rate, regular rhythm, S1&S2. sys m  · Pulmonary/Chest: Bilateral respiratory sounds. No wheezes, No rhonchi. · Abdominal: Soft. Bowel sounds present. No distension, No tenderness. · Musculoskeletal: No tenderness. No edema    · Lymphadenopathy: Has no cervical adenopathy. · Neurological: Alert and oriented. Cranial nerve appears intact, No Gross deficit   · Skin: Skin is warm and dry. No rash noted. Left chest incision has healed well. · Psychiatric: Has a normal mood, affect and behavior       Labs:  Reviewed. Lab Results   Component Value Date    CKTOTAL 97 02/04/2013    TROPONINI <0.01 10/11/2021     Lab Results   Component Value Date    .0 11/30/2015     Lab Results   Component Value Date    PROTIME 57.2 10/19/2021    PROTIME 22.9 12/12/2019    PROTIME 18.0 08/29/2019    INR 4.74 10/19/2021    INR 1.96 12/12/2019    INR 1.58 08/29/2019     Lab Results   Component Value Date    CHOL 112 03/03/2021    HDL 46 03/03/2021    HDL 51 09/13/2011    TRIG 132 03/03/2021       ECG: Personally reviewed: NSR, HR 60, , QRS 98, QTc 456    ECHO: 5.15.2020   Summary   Left ventricular cavity size is normal. There is mild concentric left   ventricular hypertrophy. Overall left ventricular systolic function appears normal with an ejection   fraction of 55-60%. No regional wall motion abnormalities are noted. Diastolic filling parameters suggest grade II diastolic dysfunction. Mitral annular calcification is present. The aortic valve is thickened/calcified. Moderate aortic stenosis with a peak velocity of 3.31m/s and a mean pressure gradient of 27mmHg. Mild tricuspid regurgitation. Estimated pulmonary artery systolic pressure is at 34 mmHg assuming a right   atrial pressure of 3 mmHg. The left atrium is mildly dilated. Stress Test: 7.8.2020  Summary    There is normal isotope uptake at stress and rest. There is no evidence of    myocardial ischemia or scar.    Normal LV size and systolic function.    Left ventricular ejection fraction of 73 %.    There are no regional wall motion abnormalities.    Overall findings represent a low risk scan.      Cardiac Angiography: N/A      Patient Active Problem List    Diagnosis Date Noted    Cardiac pacemaker in situ 02/15/2017    Bradycardia     Dizziness and giddiness     Lightheaded     Sick sinus syndrome (HCC)     Paroxysmal atrial fibrillation ([address]     Syncope 11/04/2015    Hyperlipidemia     Old myocardial infarction     Essential hypertension     Coronary artery disease involving native coronary artery of native heart without angina pectoris     Palpitations     Shortness of breath     History of coronary angioplasty     Chronic diastolic (congestive) heart failure ([address] 03/28/2019    Nonrheumatic aortic valve stenosis 03/28/2019    Acute diastolic (congestive) heart failure ([address] 03/07/2019    Community acquired pneumonia 03/07/2019    Pneumonia 03/01/2019      Assessment:   1. Paroxysmal atrial fibrillation (HCC)    2. SSS (sick sinus syndrome) ([address]    3. Sinus pause    4. Pacemaker    5. Chronic diastolic CHF (congestive heart failure) ([address]    6. Essential hypertension    7. On continuous oral anticoagulation    8. 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 Natalya Howard,  symptomatic pAF, symptomatic post conversion pauses while on large dose of fito agents, d/c'd BB, started dilt and low dose sotalol with low AF burden. Noted to have greater than 7 second pause on outpatient ambulatory monitor. S/p dual-chamber Medtronic PPM on 2/15/2017 (Dr. Jethro Alex). AF with postconversion pauses --  - In NSR  - On sotalol 80 mg twice a day with a QTC of 456  - On warfarin --followed currently by Dr. Moncho Burns, last INR  - 3.1, no evidence of bleeding, not interested in switching to 1 of the new oral anticoagulations at this time  - S/p dual chamber Medtronic PPM 2/15/2017 (Dr. Jethro Alex)  - Device check today shows 81.4 % AP, 0.1 % , no arrhythmias, other parameters stable.     - Patient has had discussion with Dr. Brie Holland regarding PVI and he has placed this on hold due to the fact that he is not had any recent episodes of atrial fibrillation  - ECG ordered and results personally reviewed     Chronic diastolic HF  - Appears euvolemic  - On Lasix 40 mg daily - last creatinine 1.2, K+ 5.1  - Hydrate pre-workout and during his workout to prevent low BPs afterwards  - Follows with Dr. Paulo Burt    HTN  - BP well controlled  - On dilt 120 mg, minoxidil 10 mg  - On spironolactone     Mod to sev AS/CAD  - On ASA/statin  - Mod per limited echo 3/2019  - Follows with Dr. Maya Allred    EF of 51-24%  No systolic HF  ASA and Statin for CAD  Anticoagulation for AF   No tobacco use    All questions and concerns were addressed to the patient/family. Alternatives to my treatment were discussed. The note was completed using EMR. Every effort was made to ensure accuracy; however, inadvertent computerized transcription errors may be present.      Thank you for allowing me to participate in the care of [address] [facility]

## 2021-12-09 ENCOUNTER — NURSE ONLY (OUTPATIENT)
Dept: CARDIOLOGY CLINIC | Age: 76
End: 2021-12-09
Payer: MEDICARE

## 2021-12-09 ENCOUNTER — OFFICE VISIT (OUTPATIENT)
Dept: CARDIOLOGY CLINIC | Age: 76
End: 2021-12-09
Payer: MEDICARE

## 2021-12-09 VITALS
HEART RATE: 60 BPM | SYSTOLIC BLOOD PRESSURE: 116 MMHG | DIASTOLIC BLOOD PRESSURE: 70 MMHG | BODY MASS INDEX: 25.46 KG/M2 | WEIGHT: 168 LBS | HEIGHT: 68 IN

## 2021-12-09 DIAGNOSIS — Z95.0 PACEMAKER: ICD-10-CM

## 2021-12-09 DIAGNOSIS — R00.1 BRADYCARDIA: ICD-10-CM

## 2021-12-09 DIAGNOSIS — I48.0 PAROXYSMAL ATRIAL FIBRILLATION (HCC): ICD-10-CM

## 2021-12-09 DIAGNOSIS — Z79.899 ENCOUNTER FOR MONITORING SOTALOL THERAPY: ICD-10-CM

## 2021-12-09 DIAGNOSIS — I45.5 SINUS PAUSE: ICD-10-CM

## 2021-12-09 DIAGNOSIS — Z51.81 ENCOUNTER FOR MONITORING SOTALOL THERAPY: ICD-10-CM

## 2021-12-09 DIAGNOSIS — I10 ESSENTIAL HYPERTENSION: ICD-10-CM

## 2021-12-09 DIAGNOSIS — R00.2 PALPITATIONS: ICD-10-CM

## 2021-12-09 DIAGNOSIS — I50.32 CHRONIC DIASTOLIC CONGESTIVE HEART FAILURE (HCC): ICD-10-CM

## 2021-12-09 DIAGNOSIS — I49.5 SSS (SICK SINUS SYNDROME) (HCC): ICD-10-CM

## 2021-12-09 DIAGNOSIS — I48.0 PAROXYSMAL ATRIAL FIBRILLATION (HCC): Primary | ICD-10-CM

## 2021-12-09 DIAGNOSIS — I50.32 CHRONIC DIASTOLIC CHF (CONGESTIVE HEART FAILURE) (HCC): ICD-10-CM

## 2021-12-09 DIAGNOSIS — R55 SYNCOPE, UNSPECIFIED SYNCOPE TYPE: ICD-10-CM

## 2021-12-09 DIAGNOSIS — I49.5 SICK SINUS SYNDROME (HCC): ICD-10-CM

## 2021-12-09 DIAGNOSIS — Z95.0 CARDIAC PACEMAKER IN SITU: ICD-10-CM

## 2021-12-09 DIAGNOSIS — Z79.01 ON CONTINUOUS ORAL ANTICOAGULATION: ICD-10-CM

## 2021-12-09 PROCEDURE — G8417 CALC BMI ABV UP PARAM F/U: HCPCS | Performed by: NURSE PRACTITIONER

## 2021-12-09 PROCEDURE — 1123F ACP DISCUSS/DSCN MKR DOCD: CPT | Performed by: NURSE PRACTITIONER

## 2021-12-09 PROCEDURE — G8484 FLU IMMUNIZE NO ADMIN: HCPCS | Performed by: NURSE PRACTITIONER

## 2021-12-09 PROCEDURE — 1036F TOBACCO NON-USER: CPT | Performed by: NURSE PRACTITIONER

## 2021-12-09 PROCEDURE — G8427 DOCREV CUR MEDS BY ELIG CLIN: HCPCS | Performed by: NURSE PRACTITIONER

## 2021-12-09 PROCEDURE — 4040F PNEUMOC VAC/ADMIN/RCVD: CPT | Performed by: NURSE PRACTITIONER

## 2021-12-09 PROCEDURE — 99214 OFFICE O/P EST MOD 30 MIN: CPT | Performed by: NURSE PRACTITIONER

## 2021-12-09 NOTE — PROGRESS NOTES
Device check and ov w/CNP FW  5y9m to LUCIANA  All testing appear wnl  Presenting ApVs @ 60 bpm  AP 81.38%  RR suggested to be turned on by Dewey Gutierrez per >40% RA pace% - Per CNP FW - RR turn on with nominal settings.  0.16% MVP On  AT/AF burden 0% - AT/AF since 12/2020  No new arrhythmia recorded. 0 changes  See Paceart report under the Cardiology tab. Follow up in 3 months via [facility].

## 2021-12-14 PROCEDURE — 93280 PM DEVICE PROGR EVAL DUAL: CPT | Performed by: INTERNAL MEDICINE

## 2022-02-08 ENCOUNTER — NURSE ONLY (OUTPATIENT)
Dept: CARDIOLOGY CLINIC | Age: 77
End: 2022-02-08
Payer: MEDICARE

## 2022-02-08 DIAGNOSIS — Z95.0 CARDIAC PACEMAKER IN SITU: ICD-10-CM

## 2022-02-08 DIAGNOSIS — I49.5 SICK SINUS SYNDROME (HCC): ICD-10-CM

## 2022-02-08 DIAGNOSIS — R00.1 BRADYCARDIA: ICD-10-CM

## 2022-02-08 DIAGNOSIS — I48.0 PAROXYSMAL ATRIAL FIBRILLATION (HCC): ICD-10-CM

## 2022-02-08 PROCEDURE — 93294 REM INTERROG EVL PM/LDLS PM: CPT | Performed by: INTERNAL MEDICINE

## 2022-02-08 PROCEDURE — 93296 REM INTERROG EVL PM/IDS: CPT | Performed by: INTERNAL MEDICINE

## 2022-02-08 NOTE — PROGRESS NOTES
We received remote transmission from patient's dual chamber pacemaker monitor at home. Transmission shows normal sensing and pacing function. AT/AF noted, 1.9% burden, longest 14hrs, w RVR at times up to 162bpm (sotalol, warfarin). EP physician will review. See interrogation under cardiology tab in the [address] [place] [address] [address] field for more details. Will continue to monitor remotely.

## 2022-02-21 ENCOUNTER — OFFICE VISIT (OUTPATIENT)
Dept: CARDIOLOGY CLINIC | Age: 77
End: 2022-02-21
Payer: MEDICARE

## 2022-02-21 VITALS
HEART RATE: 86 BPM | OXYGEN SATURATION: 96 % | WEIGHT: 169.4 LBS | BODY MASS INDEX: 25.67 KG/M2 | DIASTOLIC BLOOD PRESSURE: 78 MMHG | SYSTOLIC BLOOD PRESSURE: 142 MMHG | HEIGHT: 68 IN

## 2022-02-21 DIAGNOSIS — I10 ESSENTIAL HYPERTENSION: ICD-10-CM

## 2022-02-21 DIAGNOSIS — I25.10 CAD IN NATIVE ARTERY: Primary | ICD-10-CM

## 2022-02-21 DIAGNOSIS — I50.32 CHRONIC DIASTOLIC CONGESTIVE HEART FAILURE (HCC): ICD-10-CM

## 2022-02-21 DIAGNOSIS — Z98.61 HISTORY OF PTCA: ICD-10-CM

## 2022-02-21 DIAGNOSIS — Z95.0 PACEMAKER: ICD-10-CM

## 2022-02-21 DIAGNOSIS — I49.5 SSS (SICK SINUS SYNDROME) (HCC): ICD-10-CM

## 2022-02-21 DIAGNOSIS — I35.0 NONRHEUMATIC AORTIC VALVE STENOSIS: ICD-10-CM

## 2022-02-21 DIAGNOSIS — I48.0 PAROXYSMAL ATRIAL FIBRILLATION (HCC): ICD-10-CM

## 2022-02-21 PROCEDURE — G8417 CALC BMI ABV UP PARAM F/U: HCPCS | Performed by: INTERNAL MEDICINE

## 2022-02-21 PROCEDURE — 1036F TOBACCO NON-USER: CPT | Performed by: INTERNAL MEDICINE

## 2022-02-21 PROCEDURE — G8427 DOCREV CUR MEDS BY ELIG CLIN: HCPCS | Performed by: INTERNAL MEDICINE

## 2022-02-21 PROCEDURE — G8484 FLU IMMUNIZE NO ADMIN: HCPCS | Performed by: INTERNAL MEDICINE

## 2022-02-21 PROCEDURE — 4040F PNEUMOC VAC/ADMIN/RCVD: CPT | Performed by: INTERNAL MEDICINE

## 2022-02-21 PROCEDURE — 99214 OFFICE O/P EST MOD 30 MIN: CPT | Performed by: INTERNAL MEDICINE

## 2022-02-21 PROCEDURE — 1123F ACP DISCUSS/DSCN MKR DOCD: CPT | Performed by: INTERNAL MEDICINE

## 2022-02-21 ASSESSMENT — ENCOUNTER SYMPTOMS
CHEST TIGHTNESS: 0
SHORTNESS OF BREATH: 0
APNEA: 0
CHOKING: 0
ABDOMINAL DISTENTION: 0
COUGH: 0

## 2022-02-21 NOTE — PROGRESS NOTES
Subjective:      Patient ID: Tiffanie Gill is a 64 y.o. male. Coronary Artery Disease  Pertinent negatives include no chest pain, chest tightness, dizziness, leg swelling, palpitations or shortness of breath. His past medical history is significant for CHF. Hypertension  Pertinent negatives include no chest pain, palpitations or shortness of breath. Shortness of Breath  Pertinent negatives include no chest pain or leg swelling. His past medical history is significant for CAD. Congestive Heart Failure  Pertinent negatives include no chest pain, fatigue, palpitations or shortness of breath. His past medical history is significant for CAD. Here for follow up afib/sss/pacer/htn/cad/PTCA/CHF. Feeling good. Exercising without problem. Works out with  2x / wk. No pnd or orthopne. No edema. Wt down 30 lbs. BP up. Didn't take lasix for 3 days prior to BP being up. Restarted yesterday. No chest pain. No sob. Intermittent but minimal  afib. Rhythm good. No syncope. Past Medical History:   Diagnosis Date    Aortic stenosis 2015 Feb 2019 mean gradient 35mg Hg    Atrial fibrillation ([facility] 07/2012    Saw cardiologist for palps. Event recorder showed AF about 5% of the time. Warfarin started. Became persistent in March 2019 when admitted with pneumonia.  CAD (coronary artery disease), native coronary artery Dec 18, 2007    Presented with palpitation to ER. Slight inc. in S. troponin. Cor. angio showed 90% L. circ. Stent placed. (Dr David Daniels.  Diabetes mellitus ([facility] 1992    HbA1C in Jennifer '15 was 6.9 on metformin and glipizide.  Diastolic congestive heart failure ([facility] 08/2006    presented with wheeze, [id number]. cardiac cath to r/o ischemia. Diffuse CAD no critical stenoses. LVEF 55% LVEDP 31.    Essential hypertension since 1980    Difficult to control with need for minoxidil in addition to ARB,CCB,BB and diuretic.  Gout     Podagra intermittently over the years.  Uric acid 9.4 in 2011. Allopurinol started (with colchicine for 3 months)    Hypothyroid March 2011    Total thyroidectomy for indeterminate nodule. Proved benign.  Left carotid bruit Oct 2014    Discovered at routine exam. Carotid Doppler showed bilateral stenosis <50%    Obstructive sleep apnea 1997    as of 2019 still using CPAP    Peripheral neuropathy     2/2 DM.  Pneumonia 24/9569    complicated by hypotension and overdiuresis leading to REYNALDO. S.Cr 1.7 on 3/9/19. Improved to 1.3 on 3/11/19    Sick sinus syndrome (HCC) 02/16/2017    PIP placed because of 7 sec pauses on sotalol. Past Surgical History:   Procedure Laterality Date    CHOLECYSTECTOMY, OPEN  46    CORONARY ANGIOPLASTY  12/17/07    has stent per pt    DIAGNOSTIC CARDIAC CATH LAB PROCEDURE  12/17/07    PACEMAKER INSERTION  02/2017    Sick sinus sundrome with 7 sec pauses on sotalol.  THYROIDECTOMY  2011    Benign    TOTAL KNEE ARTHROPLASTY Right 3/12/14     Social History     Socioeconomic History    Marital status: Single     Spouse name: Not on file    Number of children: Not on file    Years of education: Not on file    Highest education level: Not on file   Occupational History    Not on file   Tobacco Use    Smoking status: Never Smoker    Smokeless tobacco: Never Used   Substance and Sexual Activity    Alcohol use: No    Drug use: No    Sexual activity: Not Currently     Comment: Single   Other Topics Concern    Not on file   Social History Narrative    Not on file     Social Determinants of Health     Financial Resource Strain:     Difficulty of Paying Living Expenses: Not on file   Food Insecurity:     Worried About Running Out of Food in the Last Year: Not on file    Julio Cesar of Food in the Last Year: Not on file   Transportation Needs:     Lack of Transportation (Medical): Not on file    Lack of Transportation (Non-Medical):  Not on file   Physical Activity:     Days of Exercise per Week: Not on file    Minutes of Exercise per Session: Not on file   Stress:     Feeling of Stress : Not on file   Social Connections:     Frequency of Communication with Friends and Family: Not on file    Frequency of Social Gatherings with Friends and Family: Not on file    Attends Restorationist Services: Not on file    Active Member of Clubs or Organizations: Not on file    Attends Club or Organization Meetings: Not on file    Marital Status: Not on file   Intimate Partner Violence:     Fear of Current or Ex-Partner: Not on file    Emotionally Abused: Not on file    Physically Abused: Not on file    Sexually Abused: Not on file   Housing Stability:     Unable to Pay for Housing in the Last Year: Not on file    Number of [place] in the Last Year: Not on file    Unstable Housing in the Last Year: Not on file       FH reviewed, denies FH cardiac issues. Vitals:    02/21/22 1542   BP: (!) 142/78   Pulse: 86   SpO2: 96%         Wt 170      Review of Systems   Constitutional: Negative for activity change and fatigue. Respiratory: Negative for apnea, cough, choking, chest tightness and shortness of breath. Cardiovascular: Negative for chest pain, palpitations and leg swelling. No PND or orthopnea. No tachycardia. Gastrointestinal: Negative for abdominal distention. Musculoskeletal: Negative for myalgias. Neurological: Negative for dizziness, syncope and light-headedness. Psychiatric/Behavioral: Negative for agitation, behavioral problems and confusion. All other systems reviewed negative as done. Objective:   Physical Exam  Constitutional:       General: He is not in acute distress. Appearance: He is well-developed. HENT:      Head: Normocephalic and atraumatic. Eyes:      General:         Right eye: No discharge. Left eye: No discharge. Conjunctiva/sclera: Conjunctivae normal.   Neck:      Vascular: No JVD. Cardiovascular:      Rate and Rhythm: Normal rate and regular rhythm. Pulses:           Radial pulses are 2+ on the right side and 2+ on the left side. Heart sounds: S1 normal and S2 normal. Murmur heard. No gallop. Comments: 2/6 syst M  Pulmonary:      Effort: Pulmonary effort is normal.      Breath sounds: Normal breath sounds. No wheezing or rales. Abdominal:      General: Bowel sounds are normal.      Palpations: Abdomen is soft. Tenderness: There is no abdominal tenderness. Musculoskeletal:         General: Normal range of motion. Cervical back: Normal range of motion and neck supple. Skin:     General: Skin is warm and dry. Neurological:      Mental Status: He is alert and oriented to person, place, and time. Psychiatric:         Behavior: Behavior normal.         Thought Content: Thought content normal.         Assessment:       Diagnosis Orders   1. CAD in native artery     2. History of PTCA     3. Chronic diastolic congestive heart failure ([address]     4. SSS (sick sinus syndrome) ([address]     5. Pacemaker     6. Nonrheumatic aortic valve stenosis     7. Paroxysmal atrial fibrillation (HCC)     8. Essential hypertension               Plan:      CV stable. Rhythm stable. BP up but likely due to holding lasix for 3 days prior. Restart. EP following pacemaker/afib. Rhythm stable. Exercising 2x per wik with . Wt stable. Will continue sotalol for afib. On AC, no bleeding issues. Now on Xarelto 15 mg qd and now off coumadin. Difficult to stabilize. Will continue Aldactone,  Recommended diet, exercise and wt loss. Reviewed previous records and testing including myoview 7/20 and echo 5/20. No changes. Continue to monitor. Will have echo. Follow up 3-4 wks.

## 2022-02-28 ENCOUNTER — PROCEDURE VISIT (OUTPATIENT)
Dept: CARDIOLOGY CLINIC | Age: 77
End: 2022-02-28
Payer: MEDICARE

## 2022-02-28 DIAGNOSIS — I50.32 CHRONIC DIASTOLIC CONGESTIVE HEART FAILURE (HCC): ICD-10-CM

## 2022-02-28 DIAGNOSIS — I25.10 CAD IN NATIVE ARTERY: ICD-10-CM

## 2022-02-28 DIAGNOSIS — I49.5 SSS (SICK SINUS SYNDROME) (HCC): ICD-10-CM

## 2022-02-28 DIAGNOSIS — Z98.61 HISTORY OF PTCA: ICD-10-CM

## 2022-02-28 DIAGNOSIS — Z95.0 PACEMAKER: ICD-10-CM

## 2022-02-28 DIAGNOSIS — I10 ESSENTIAL HYPERTENSION: ICD-10-CM

## 2022-02-28 DIAGNOSIS — I48.0 PAROXYSMAL ATRIAL FIBRILLATION (HCC): ICD-10-CM

## 2022-02-28 DIAGNOSIS — I35.0 NONRHEUMATIC AORTIC VALVE STENOSIS: ICD-10-CM

## 2022-02-28 LAB
LV EF: 63 %
LVEF MODALITY: NORMAL

## 2022-02-28 PROCEDURE — 93306 TTE W/DOPPLER COMPLETE: CPT | Performed by: INTERNAL MEDICINE

## 2022-02-28 PROCEDURE — 93356 MYOCRD STRAIN IMG SPCKL TRCK: CPT | Performed by: INTERNAL MEDICINE

## 2022-03-30 ENCOUNTER — OFFICE VISIT (OUTPATIENT)
Dept: CARDIOLOGY CLINIC | Age: 77
End: 2022-03-30
Payer: MEDICARE

## 2022-03-30 VITALS
HEART RATE: 62 BPM | OXYGEN SATURATION: 97 % | BODY MASS INDEX: 25.76 KG/M2 | SYSTOLIC BLOOD PRESSURE: 110 MMHG | HEIGHT: 68 IN | WEIGHT: 170 LBS | DIASTOLIC BLOOD PRESSURE: 80 MMHG

## 2022-03-30 DIAGNOSIS — I48.0 PAROXYSMAL ATRIAL FIBRILLATION (HCC): ICD-10-CM

## 2022-03-30 DIAGNOSIS — Z95.0 PACEMAKER: ICD-10-CM

## 2022-03-30 DIAGNOSIS — I49.5 SSS (SICK SINUS SYNDROME) (HCC): ICD-10-CM

## 2022-03-30 DIAGNOSIS — Z98.61 HISTORY OF PTCA: ICD-10-CM

## 2022-03-30 DIAGNOSIS — I35.0 NONRHEUMATIC AORTIC VALVE STENOSIS: ICD-10-CM

## 2022-03-30 DIAGNOSIS — I25.10 CAD IN NATIVE ARTERY: ICD-10-CM

## 2022-03-30 DIAGNOSIS — I50.32 CHRONIC DIASTOLIC CONGESTIVE HEART FAILURE (HCC): ICD-10-CM

## 2022-03-30 DIAGNOSIS — I10 ESSENTIAL HYPERTENSION: Primary | ICD-10-CM

## 2022-03-30 PROCEDURE — G8417 CALC BMI ABV UP PARAM F/U: HCPCS | Performed by: INTERNAL MEDICINE

## 2022-03-30 PROCEDURE — G8427 DOCREV CUR MEDS BY ELIG CLIN: HCPCS | Performed by: INTERNAL MEDICINE

## 2022-03-30 PROCEDURE — 4040F PNEUMOC VAC/ADMIN/RCVD: CPT | Performed by: INTERNAL MEDICINE

## 2022-03-30 PROCEDURE — 99214 OFFICE O/P EST MOD 30 MIN: CPT | Performed by: INTERNAL MEDICINE

## 2022-03-30 PROCEDURE — 1123F ACP DISCUSS/DSCN MKR DOCD: CPT | Performed by: INTERNAL MEDICINE

## 2022-03-30 PROCEDURE — G8484 FLU IMMUNIZE NO ADMIN: HCPCS | Performed by: INTERNAL MEDICINE

## 2022-03-30 PROCEDURE — 1036F TOBACCO NON-USER: CPT | Performed by: INTERNAL MEDICINE

## 2022-03-30 ASSESSMENT — ENCOUNTER SYMPTOMS
COUGH: 0
SHORTNESS OF BREATH: 0
CHEST TIGHTNESS: 0
ABDOMINAL DISTENTION: 0
CHOKING: 0
APNEA: 0

## 2022-03-30 NOTE — PROGRESS NOTES
Subjective:      Patient ID: Ning Fernández is a 64 y.o. male. Coronary Artery Disease  Pertinent negatives include no chest pain, chest tightness, dizziness, leg swelling, palpitations or shortness of breath. His past medical history is significant for CHF. Hypertension  Pertinent negatives include no chest pain, palpitations or shortness of breath. Shortness of Breath  Pertinent negatives include no chest pain or leg swelling. His past medical history is significant for CAD. Congestive Heart Failure  Pertinent negatives include no chest pain, fatigue, palpitations or shortness of breath. His past medical history is significant for CAD. Here for follow up afib/sss/pacer/htn/cad/PTCA/CHF. Feeling good. Exercising without problem. Works out with  2x / wk. No pnd or orthopne. No edema. Wt stable. BP better at home except when having shoulder pain. No chest pain. No sob. Rhythm good. No syncope. Past Medical History:   Diagnosis Date    Aortic stenosis 2015 Feb 2019 mean gradient 35mg Hg    [facility] 07[facility]2012    Saw cardiologist for palps. Event recorder showed AF about 5% of the time. Warfarin started. Became persistent in March 2019 when admitted with pneumonia.  CAD (coronary artery disease), native coronary artery Dec 18, 2007    Presented with palpitation to ER. Slight inc. in S. troponin. Cor. angio showed 90% L. circ. Stent placed. (Dr Montero Boards.  Diabetes mellitus ([facility] [address] 1992    HbA1C in Jennifer '15 was 6.9 on metformin and glipizide.  Diastolic congestive heart failure [facility] 08/2006    presented with wheeze, [id number]. cardiac cath to r/o ischemia. Diffuse CAD no critical stenoses. LVEF 55% LVEDP 31.    Essential hypertension since 1980    Difficult to control with need for minoxidil in addition to ARB,CCB,BB and diuretic.  Gout     Podagra intermittently over the years. Uric acid 9.4 in 2011.  Allopurinol started (with colchicine for 3 months)    Hypothyroid March 2011    Total thyroidectomy for indeterminate nodule. Proved benign.  Left carotid bruit Oct 2014    Discovered at routine exam. Carotid Doppler showed bilateral stenosis <50%    Obstructive sleep apnea 1997    as of 2019 still using CPAP    Peripheral neuropathy     2/2 DM.  Pneumonia 48/0981    complicated by hypotension and overdiuresis leading to REYNALDO. S.Cr 1.7 on 3/9/19. Improved to 1.3 on 3/11/19    Sick sinus syndrome (HCC) 02/16/2017    PIP placed because of 7 sec pauses on sotalol. Past Surgical History:   Procedure Laterality Date    CHOLECYSTECTOMY, OPEN  46    CORONARY ANGIOPLASTY  12/17/07    has stent per pt    DIAGNOSTIC CARDIAC CATH LAB PROCEDURE  12/17/07    PACEMAKER INSERTION  02/2017    Sick sinus sundrome with 7 sec pauses on sotalol.  THYROIDECTOMY  2011    Benign    TOTAL KNEE ARTHROPLASTY Right 3/12/14     Social History     Socioeconomic History    Marital status: Single     Spouse name: Not on file    Number of children: Not on file    Years of education: Not on file    Highest education level: Not on file   Occupational History    Not on file   Tobacco Use    Smoking status: Never Smoker    Smokeless tobacco: Never Used   Substance and Sexual Activity    Alcohol use: No    Drug use: No    Sexual activity: Not Currently     Comment: Single   Other Topics Concern    Not on file   Social History Narrative    Not on file     Social Determinants of Health     Financial Resource Strain:     Difficulty of Paying Living Expenses: Not on file   Food Insecurity:     Worried About Running Out of Food in the Last Year: Not on file    Julio Cesar of Food in the Last Year: Not on file   Transportation Needs:     Lack of Transportation (Medical): Not on file    Lack of Transportation (Non-Medical):  Not on file   Physical Activity:     Days of Exercise per Week: Not on file    Minutes of Exercise per Session: Not on file   Stress:     Feeling of Stress : Not on file   Social Connections:     Frequency of Communication with Friends and Family: Not on file    Frequency of Social Gatherings with Friends and Family: Not on file    Attends Confucianist Services: Not on file    Active Member of Clubs or Organizations: Not on file    Attends Club or Organization Meetings: Not on file    Marital Status: Not on file   Intimate Partner Violence:     Fear of Current or Ex-Partner: Not on file    Emotionally Abused: Not on file    Physically Abused: Not on file    Sexually Abused: Not on file   Housing Stability:     Unable to Pay for Housing in the Last Year: Not on file    Number of [place] in the Last Year: Not on file    Unstable Housing in the Last Year: Not on file       FH reviewed, denies FH cardiac issues. Vitals:    03/30/22 1504   BP: 110/80   Pulse: 62   SpO2: 97%         Wt 170      Review of Systems   Constitutional: Negative for activity change and fatigue. Respiratory: Negative for apnea, cough, choking, chest tightness and shortness of breath. Cardiovascular: Negative for chest pain, palpitations and leg swelling. No PND or orthopnea. No tachycardia. Gastrointestinal: Negative for abdominal distention. Musculoskeletal: Negative for myalgias. Neurological: Negative for dizziness, syncope and light-headedness. Psychiatric/Behavioral: Negative for agitation, behavioral problems and confusion. All other systems reviewed negative as done. Objective:   Physical Exam  Constitutional:       General: He is not in acute distress. Appearance: He is well-developed. HENT:      Head: Normocephalic and atraumatic. Eyes:      General:         Right eye: No discharge. Left eye: No discharge. Conjunctiva/sclera: Conjunctivae normal.   Neck:      Vascular: No JVD. Cardiovascular:      Rate and Rhythm: Normal rate and regular rhythm.       Pulses:           Radial pulses are 2+ on the right side and 2+ on the left side. Heart sounds: S1 normal and S2 normal. Murmur heard. No gallop. Comments: 2/6 syst M  Pulmonary:      Effort: Pulmonary effort is normal.      Breath sounds: Normal breath sounds. No wheezing or rales. Abdominal:      General: Bowel sounds are normal.      Palpations: Abdomen is soft. Tenderness: There is no abdominal tenderness. Musculoskeletal:         General: Normal range of motion. Cervical back: Normal range of motion and neck supple. Skin:     General: Skin is warm and dry. Neurological:      Mental Status: He is alert and oriented to person, place, and time. Psychiatric:         Behavior: Behavior normal.         Thought Content: Thought content normal.         Assessment:       Diagnosis Orders   1. Essential hypertension     2. CAD in native artery     3. History of PTCA     4. SSS (sick sinus syndrome) ([address]     5. Chronic diastolic congestive heart failure ([address]     6. Pacemaker     7. Nonrheumatic aortic valve stenosis     8. Paroxysmal atrial fibrillation (HCC)             Plan:      CV stable. Rhythm stable. BP very good. EP following pacemaker/afib. Rhythm stable. Exercising 2x per wik with . Wt stable. Will continue sotalol for afib. On AC, no bleeding issues. Continue  on Xarelto 15 mg qd. Will continue Aldactone. Recommended diet, exercise and wt loss. Reviewed previous records and testing including myoview 7/20 and echo 3/22. No changes. Continue to monitor. Follow up 3 months.

## 2022-04-25 ENCOUNTER — HOSPITAL ENCOUNTER (OUTPATIENT)
Dept: VASCULAR LAB | Age: 77
Discharge: HOME | End: 2022-04-25
Payer: MEDICARE

## 2022-04-25 DIAGNOSIS — I65.23 BILATERAL CAROTID ARTERY STENOSIS: ICD-10-CM

## 2022-04-25 PROCEDURE — 93880 EXTRACRANIAL BILAT STUDY: CPT

## 2022-05-10 ENCOUNTER — NURSE ONLY (OUTPATIENT)
Dept: CARDIOLOGY CLINIC | Age: 77
End: 2022-05-10
Payer: MEDICARE

## 2022-05-10 DIAGNOSIS — R00.1 BRADYCARDIA: ICD-10-CM

## 2022-05-10 DIAGNOSIS — Z95.0 CARDIAC PACEMAKER IN SITU: ICD-10-CM

## 2022-05-11 PROCEDURE — 93294 REM INTERROG EVL PM/LDLS PM: CPT | Performed by: INTERNAL MEDICINE

## 2022-05-11 PROCEDURE — 93296 REM INTERROG EVL PM/IDS: CPT | Performed by: INTERNAL MEDICINE

## 2022-05-11 NOTE — PROGRESS NOTES
We received remote transmission from patient's dual chamber pacemaker monitor at home. Transmission shows normal sensing and pacing function. AT/AF noted, <0.1% burden, longest 11 min, w RVR at times up to 133 bpm (sotalol, xarelto). EP physician will review. See interrogation under cardiology tab in the [address] [place] [address] [address] field for more details. Will continue to monitor remotely.

## 2022-06-02 NOTE — PROGRESS NOTES
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 Hellen Saint, s/s pAF, s/s post conversion pauses while on large dose of fito agents, d/c'd BB, started dilt and low dose sotalol with low AF burden, >7 sec pause noted on outpt monitor, s/p dual-chamber Medtronic PPM on (2/15/2017, Dr. Christina Daniels). Interval Hx/HPI: Patient is here for follow-up for paroxysmal AF, SSS, and PPM management. Patient has a longstanding history of paroxysmal atrial fibrillation for which he has been symptomatic in the past.  He was noted to have conversion pauses while on larger doses of AV fito agents. Beta-blockers were discontinued and he was placed on diltiazem and low-dose sotalol. His AF burden was lower with the addition of diltiazem and sotalol. He then was noted to have a 7-second pause noted on outpatient monitor and underwent a dual-chamber Medtronic PPM on 2/15/2017. His sotalol was increased to 80 mg twice a day. Review of his device today shows that he atrially paces 97.5% of the time and ventricularly paced 0.1% of the time. His underlying rhythm is sinus bradycardia with 45 to 55 bpm.  He has an estimated longevity of 4-1/2 years to [facility]. He did have 15 episodes of AT/AF for total burden of 0.6%. His longest episode was approximately 14 hours and this was on 12/9/2021. He has been asymptomatic with these episodes. He states that since we returned rate response on he has had a lot more energy and does not get as fatigued as he had been in the past.  He remains on sotalol 80 mg twice a day. Review of his labs show that his last creatinine was 1.4. His creatinine clearance is 46.6. He denies any chest pain, shortness of breath, PND, orthopnea or lower extremity edema. He exercises 2 days a week with a . Home medications:   Current Outpatient Medications on File Prior to Visit   Medication Sig Dispense Refill    rivaroxaban (XARELTO) 15 MG TABS tablet Take 15 mg by mouth      albuterol sulfate HFA (PROVENTIL HFA) 108 (90 Base) MCG/ACT inhaler Inhale 2 puffs into the lungs every 4 hours as needed for Wheezing or Shortness of Breath (Space out to every 6 hours as symptoms improve) Space out to every 6 hours as symptoms improve. 18 g 0    ipratropium (ATROVENT) 0.03 % nasal spray 2 sprays by Each Nostril route every 12 hours      furosemide (LASIX) 20 MG tablet Take 1 tablet by mouth daily 90 tablet 5    spironolactone (ALDACTONE) 25 MG tablet TAKE 1 TABLET BY MOUTH DAILY 30 tablet 0    sotalol (BETAPACE) 80 MG tablet TAKE 1 TABLET BY MOUTH 2 TIMES DAILY 180 tablet 3    oxybutynin (DITROPAN) 5 MG/5ML syrup Take 5 mg by mouth as needed       tamsulosin (FLOMAX) 0.4 MG capsule Take 0.4 mg by mouth nightly      traZODone (DESYREL) 100 MG tablet Take 100 mg by mouth nightly      atorvastatin (LIPITOR) 40 MG tablet Take 40 mg by mouth nightly       aspirin 81 MG tablet Take 81 mg by mouth daily.  levothyroxine (SYNTHROID) 150 MCG tablet Take 150 mcg by mouth daily.  allopurinol (ZYLOPRIM) 300 MG tablet Take 300 mg by mouth nightly       gemfibrozil (LOPID) 600 MG tablet Take 600 mg by mouth 2 times daily (before meals).  metformin (GLUCOPHAGE) 1000 MG tablet Take 1,000 mg by mouth 2 times daily (with meals).  Multiple Vitamins-Minerals (PRESERVISION AREDS PO) Take 2 capsules by mouth 2 times daily       No current facility-administered medications on file prior to visit. Past Medical History:   Diagnosis Date    Aortic stenosis 2015 Feb 2019 mean gradient 35mg Hg    Atrial fibrillation ([facility] [address] 07/2012    Saw cardiologist for palps.  Event recorder showed AF about 5% of the time. Warfarin started. Became persistent in March 2019 when admitted with pneumonia.  CAD (coronary artery disease), native coronary artery Dec 18, 2007    Presented with palpitation to ER. Slight inc. in S. troponin. Cor. angio showed 90% L. circ. Stent placed. (Dr Muna Guzman.  Diabetes mellitus ([facility] [address] 1992    HbA1C in Jennifer '15 was 6.9 on metformin and glipizide.  Diastolic congestive heart failure ([facility] [address] 08/2006    presented with wheeze, [id number]. cardiac cath to r/o ischemia. Diffuse CAD no critical stenoses. LVEF 55% LVEDP 31.    Essential hypertension since 1980    Difficult to control with need for minoxidil in addition to ARB,CCB,BB and diuretic.  Gout     Podagra intermittently over the years. Uric acid 9.4 in 2011. Allopurinol started (with colchicine for 3 months)    Hypothyroid March 2011    Total thyroidectomy for indeterminate nodule. Proved benign.  Left carotid bruit Oct 2014    Discovered at routine exam. Carotid Doppler showed bilateral stenosis <50%    Obstructive sleep apnea 1997    as of 2019 still using CPAP    Peripheral neuropathy     2/2 DM.  Pneumonia 48/6692    complicated by hypotension and overdiuresis leading to REYNALDO. S.Cr 1.7 on 3/9/19. Improved to 1.3 on 3/11/19    Sick sinus syndrome (HCC) 02/16/2017    PIP placed because of 7 sec pauses on sotalol. Past Surgical History:   Procedure Laterality Date    CHOLECYSTECTOMY, OPEN  44    CORONARY ANGIOPLASTY  12/17/07    has stent per pt    DIAGNOSTIC CARDIAC CATH LAB PROCEDURE  12/17/07    PACEMAKER INSERTION  02/2017    Sick sinus sundrome with 7 sec pauses on sotalol.  THYROIDECTOMY  2011    Benign    TOTAL KNEE ARTHROPLASTY Right 3/12/14       Allergies   Allergen Reactions    Erythromycin Hives and Swelling     Swelling of hands and feet, all-over hives. Social History:  Reviewed. reports that he has never smoked.  He has never used smokeless tobacco. He reports that he does not drink alcohol and does not use drugs. Family History:  Reviewed. family history includes Heart Attack in his maternal grandfather; Heart Defect in his maternal grandfather and mother; Heart Disease in his father; Heart Surgery in his father; Pacemaker in his mother. Review of System:    · Constitutional: No fevers, chills. · Eyes: No visual changes or diplopia. No scleral icterus. · ENT: No Headaches. No mouth sores or sore throat. · Cardiovascular: No for chest pain, No for dyspnea on exertion, No for palpitations or No for loss of consciousness. No cough, hemoptysis, No for pleuritic pain, or phlebitis. · Respiratory: No for cough or wheezing. No hematemesis. · Gastrointestinal: No abdominal pain, blood in stools. · Genitourinary: No dysuria, or hematuria. · Musculoskeletal: No gait disturbance,    · Integumentary: No rash or pruritis. · Neurological: No headache, change in muscle strength, numbness or tingling. · Psychiatric: No anxiety, or depression. · Endocrine: No temperature intolerance. No excessive thirst, fluid intake, or urination. · Hem/Lymph: No abnormal bruising or bleeding, blood clots or swollen lymph nodes. · Allergic/Immunologic: No nasal congestion or hives. Physical Examination:  Vitals:    06/07/22 1428   BP: 110/60   Pulse: 62        Wt Readings from Last 3 Encounters:   06/07/22 170 lb 6.4 oz (77.3 kg)   03/30/22 170 lb (77.1 kg)   02/21/22 169 lb 6.4 oz (76.8 kg)     · Constitutional: Oriented. No distress. · Head: Normocephalic and atraumatic. · Mouth/Throat: Oropharynx is clear and moist.   · Eyes: Conjunctivae normal. EOM are normal.   · Neck: Neck supple. No rigidity. No JVD present. · Cardiovascular: Normal rate, regular rhythm, S1&S2. sys m  · Pulmonary/Chest: Bilateral respiratory sounds. No wheezes, No rhonchi. · Abdominal: Soft. Bowel sounds present. No distension, No tenderness. · Musculoskeletal: No tenderness.  No edema · Lymphadenopathy: Has no cervical adenopathy. · Neurological: Alert and oriented. Cranial nerve appears intact, No Gross deficit   · Skin: Skin is warm and dry. No rash noted. Left chest incision has healed well. · Psychiatric: Has a normal mood, affect and behavior       Labs:  Reviewed. Lab Results   Component Value Date    CKTOTAL 97 02/04/2013    TROPONINI <0.01 10/11/2021     Lab Results   Component Value Date    .0 11/30/2015     Lab Results   Component Value Date    PROTIME 57.2 10/19/2021    PROTIME 22.9 12/12/2019    PROTIME 18.0 08/29/2019    INR 4.74 10/19/2021    INR 1.96 12/12/2019    INR 1.58 08/29/2019     Lab Results   Component Value Date    CHOL 126 04/11/2022    HDL 60 04/11/2022    HDL 51 09/13/2011    TRIG 58 04/11/2022       ECG: Personally reviewed: NSR, HR 62, , QRS 94, QTc 435    ECHO: 2/29/22   Summary   Left ventricular cavity size is normal.   There is mild concentric left ventricular hypertrophy. Left ventricular function is normal with ejection fraction estimated at   60-65%. No regional wall motion abnormalities are noted. Diastolic filling parameters suggest grade II diastolic dysfunction. Global L. Strain = -17.2%. Mitral annular calcification is present. Mild mitral regurgitation is present. Bi-atrial enlargement. Moderate aortic stenosis. Mild to moderate aortic regurgitation. Mild tricuspid regurgitation. Estimated pulmonary artery systolic pressure is at 43 mmHg assuming a right   atrial pressure of 3 mmHg     Stress Test: 7.8.2020  Summary    There is normal isotope uptake at stress and rest. There is no evidence of    myocardial ischemia or scar.    Normal LV size and systolic function.    Left ventricular ejection fraction of 73 %.    There are no regional wall motion abnormalities.    Overall findings represent a low risk scan.      Cardiac Angiography: N/A      Patient Active Problem List    Diagnosis Date Noted    Cardiac pacemaker in situ 02/15/2017    Bradycardia     Dizziness and giddiness     Lightheaded     Sick sinus syndrome (HCC)     Paroxysmal atrial fibrillation ([address]     Syncope 11/04/2015    Hyperlipidemia     Old myocardial infarction     Essential hypertension     Coronary artery disease involving native coronary artery of native heart without angina pectoris     Palpitations     Shortness of breath     History of coronary angioplasty     Chronic diastolic (congestive) heart failure ([address] 03/28/2019    Nonrheumatic aortic valve stenosis 03/28/2019    Acute diastolic (congestive) heart failure ([address] 03/07/2019    Community acquired pneumonia 03/07/2019    Pneumonia 03/01/2019      Assessment:   1. Paroxysmal atrial fibrillation (HCC)    2. Sinus pause    3. SSS (sick sinus syndrome) ([address]    4. Pacemaker    5. Coronary artery disease involving native coronary artery of native heart without angina pectoris    6. Benign essential HTN    7. Encounter for monitoring sotalol therapy    8. 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 Ellie Hashimoto, s/s pAF, s/s post conversion pauses while on large dose of fito agents, d/c'd BB, started dilt and low dose sotalol with low AF burden, >7 sec pause noted on outpt monitor, s/p dual-chamber Medtronic PPM on (2/15/2017, Dr. Venkat Olson). [id number] 5. TSH 1.09 (4/11/2022).      AF with postconversion pauses --  - In NSR - 0.6% AFB per device  - On sotalol 80 mg twice a day with a QTC of 435 - CrCl 46.66  - Will check a BMP and Mg in July - may need to adjust sotalol dose based on CrCl  - On Xarelto 15 mg QD - no s/s bleeding - continue  - S/p dual chamber Medtronic PPM (2/15/2017, Dr. Venkat Olson)  - Device check today shows 97.5 % AP, 0.1 % , 15 episodes of AF with the longest lasting ~14 hours in length, other parameters are WNL. - Discussed PVI in the past and this was placed on hold due to low AF burden per patient  - Will have see pt see Dr. Kenneth Deluna in 6 months  - ECG ordered and results personally reviewed     Chronic diastolic HF  - Appears euvolemic  - EF  - On Lasix 20 mg daily - last creatinine 1.4, K+ 5.2, takes an extra 10 mg prn edema  - Hydrate pre-workout and during his workout to prevent low BPs afterwards    HTN  - BP well controlled  - On dilt 120 mg, taken of minoxidil for hypotension  - On spironolactone  25 mg QD    Mod to sev AS/CAD  - On ASA/statin  - Mod per limited echo 2/28/2022  - Follows with Dr. Morro Grajeda    EF of 26-52%  No systolic HF  ASA and Statin for CAD  Anticoagulation for AF   No tobacco use    All questions and concerns were addressed to the patient/family. Alternatives to my treatment were discussed. The note was completed using EMR. Every effort was made to ensure accuracy; however, inadvertent computerized transcription errors may be present.      Thank you for allowing me to participate in the care of [address] [facility]

## 2022-06-07 ENCOUNTER — OFFICE VISIT (OUTPATIENT)
Dept: CARDIOLOGY CLINIC | Age: 77
End: 2022-06-07
Payer: MEDICARE

## 2022-06-07 ENCOUNTER — NURSE ONLY (OUTPATIENT)
Dept: CARDIOLOGY CLINIC | Age: 77
End: 2022-06-07
Payer: MEDICARE

## 2022-06-07 VITALS
SYSTOLIC BLOOD PRESSURE: 110 MMHG | HEART RATE: 62 BPM | WEIGHT: 170.4 LBS | BODY MASS INDEX: 25.91 KG/M2 | DIASTOLIC BLOOD PRESSURE: 60 MMHG

## 2022-06-07 DIAGNOSIS — Z79.01 ON CONTINUOUS ORAL ANTICOAGULATION: ICD-10-CM

## 2022-06-07 DIAGNOSIS — I49.5 SICK SINUS SYNDROME (HCC): ICD-10-CM

## 2022-06-07 DIAGNOSIS — I48.0 PAROXYSMAL ATRIAL FIBRILLATION (HCC): ICD-10-CM

## 2022-06-07 DIAGNOSIS — I45.5 SINUS PAUSE: ICD-10-CM

## 2022-06-07 DIAGNOSIS — R55 SYNCOPE, UNSPECIFIED SYNCOPE TYPE: ICD-10-CM

## 2022-06-07 DIAGNOSIS — R00.1 BRADYCARDIA: ICD-10-CM

## 2022-06-07 DIAGNOSIS — Z79.899 ENCOUNTER FOR MONITORING SOTALOL THERAPY: ICD-10-CM

## 2022-06-07 DIAGNOSIS — I48.0 PAROXYSMAL ATRIAL FIBRILLATION (HCC): Primary | ICD-10-CM

## 2022-06-07 DIAGNOSIS — I50.32 CHRONIC DIASTOLIC (CONGESTIVE) HEART FAILURE (HCC): ICD-10-CM

## 2022-06-07 DIAGNOSIS — I49.5 SSS (SICK SINUS SYNDROME) (HCC): ICD-10-CM

## 2022-06-07 DIAGNOSIS — I25.10 CORONARY ARTERY DISEASE INVOLVING NATIVE CORONARY ARTERY OF NATIVE HEART WITHOUT ANGINA PECTORIS: ICD-10-CM

## 2022-06-07 DIAGNOSIS — I10 BENIGN ESSENTIAL HTN: ICD-10-CM

## 2022-06-07 DIAGNOSIS — R00.2 PALPITATIONS: ICD-10-CM

## 2022-06-07 DIAGNOSIS — Z51.81 ENCOUNTER FOR MONITORING SOTALOL THERAPY: ICD-10-CM

## 2022-06-07 DIAGNOSIS — Z95.0 PACEMAKER: ICD-10-CM

## 2022-06-07 DIAGNOSIS — Z95.0 CARDIAC PACEMAKER IN SITU: ICD-10-CM

## 2022-06-07 PROCEDURE — G8417 CALC BMI ABV UP PARAM F/U: HCPCS | Performed by: NURSE PRACTITIONER

## 2022-06-07 PROCEDURE — 99214 OFFICE O/P EST MOD 30 MIN: CPT | Performed by: NURSE PRACTITIONER

## 2022-06-07 PROCEDURE — 1036F TOBACCO NON-USER: CPT | Performed by: NURSE PRACTITIONER

## 2022-06-07 PROCEDURE — G8427 DOCREV CUR MEDS BY ELIG CLIN: HCPCS | Performed by: NURSE PRACTITIONER

## 2022-06-07 PROCEDURE — 1123F ACP DISCUSS/DSCN MKR DOCD: CPT | Performed by: NURSE PRACTITIONER

## 2022-06-07 PROCEDURE — 93280 PM DEVICE PROGR EVAL DUAL: CPT | Performed by: INTERNAL MEDICINE

## 2022-06-07 NOTE — PROGRESS NOTES
Patient comes in for programming evaluation on their Medtronic dual chamber pacemaker. Last remote 5.11.2022    All sensing and pacing parameters are within normal range. Battery life 4.5 years  Underlying SB. AP 97.6%.  0.1%. AT/AF burden 0.6%  PVC 1.4/hr  15 ATAF. Recent 4.7.2022. Longest x 14 hrs (12.9.2021). Patient remains on Asa 81 mg, furosemide, xarelto, sotalol. No changes need to be made at this time. Please see interrogation for more detail. Patient will see NPFW today and follow up in 3 months in office or remotely.

## 2022-07-06 ENCOUNTER — OFFICE VISIT (OUTPATIENT)
Dept: CARDIOLOGY CLINIC | Age: 77
End: 2022-07-06
Payer: MEDICARE

## 2022-07-06 VITALS
BODY MASS INDEX: 25.85 KG/M2 | SYSTOLIC BLOOD PRESSURE: 118 MMHG | DIASTOLIC BLOOD PRESSURE: 70 MMHG | WEIGHT: 170 LBS | HEART RATE: 76 BPM

## 2022-07-06 DIAGNOSIS — I10 ESSENTIAL HYPERTENSION: Primary | ICD-10-CM

## 2022-07-06 DIAGNOSIS — I49.5 SSS (SICK SINUS SYNDROME) (HCC): ICD-10-CM

## 2022-07-06 DIAGNOSIS — I50.32 CHRONIC DIASTOLIC CONGESTIVE HEART FAILURE (HCC): ICD-10-CM

## 2022-07-06 DIAGNOSIS — I35.0 NONRHEUMATIC AORTIC VALVE STENOSIS: ICD-10-CM

## 2022-07-06 DIAGNOSIS — I25.10 CAD IN NATIVE ARTERY: ICD-10-CM

## 2022-07-06 DIAGNOSIS — I48.0 PAROXYSMAL ATRIAL FIBRILLATION (HCC): ICD-10-CM

## 2022-07-06 DIAGNOSIS — Z95.0 PACEMAKER: ICD-10-CM

## 2022-07-06 DIAGNOSIS — Z98.61 HISTORY OF PTCA: ICD-10-CM

## 2022-07-06 PROCEDURE — 1123F ACP DISCUSS/DSCN MKR DOCD: CPT | Performed by: INTERNAL MEDICINE

## 2022-07-06 PROCEDURE — G8417 CALC BMI ABV UP PARAM F/U: HCPCS | Performed by: INTERNAL MEDICINE

## 2022-07-06 PROCEDURE — G8428 CUR MEDS NOT DOCUMENT: HCPCS | Performed by: INTERNAL MEDICINE

## 2022-07-06 PROCEDURE — 1036F TOBACCO NON-USER: CPT | Performed by: INTERNAL MEDICINE

## 2022-07-06 PROCEDURE — 99214 OFFICE O/P EST MOD 30 MIN: CPT | Performed by: INTERNAL MEDICINE

## 2022-07-06 ASSESSMENT — ENCOUNTER SYMPTOMS
ABDOMINAL DISTENTION: 0
CHEST TIGHTNESS: 0
COUGH: 0
CHOKING: 0
SHORTNESS OF BREATH: 0
APNEA: 0

## 2022-07-20 DIAGNOSIS — I48.0 PAROXYSMAL ATRIAL FIBRILLATION (HCC): ICD-10-CM

## 2022-07-20 DIAGNOSIS — I25.10 CORONARY ARTERY DISEASE INVOLVING NATIVE CORONARY ARTERY OF NATIVE HEART WITHOUT ANGINA PECTORIS: ICD-10-CM

## 2022-07-21 DIAGNOSIS — I48.0 PAROXYSMAL ATRIAL FIBRILLATION (HCC): Primary | ICD-10-CM

## 2022-07-21 LAB
ANION GAP SERPL CALCULATED.3IONS-SCNC: 14 MMOL/L (ref 3–16)
BUN BLDV-MCNC: 44 MG/DL (ref 7–20)
CALCIUM SERPL-MCNC: 9.2 MG/DL (ref 8.3–10.6)
CHLORIDE BLD-SCNC: 102 MMOL/L (ref 99–110)
CO2: 20 MMOL/L (ref 21–32)
CREAT SERPL-MCNC: 1.4 MG/DL (ref 0.8–1.3)
GFR AFRICAN AMERICAN: 59
GFR NON-AFRICAN AMERICAN: 49
GLUCOSE BLD-MCNC: 100 MG/DL (ref 70–99)
MAGNESIUM: 2.1 MG/DL (ref 1.8–2.4)
POTASSIUM SERPL-SCNC: 5.3 MMOL/L (ref 3.5–5.1)
SODIUM BLD-SCNC: 136 MMOL/L (ref 136–145)

## 2022-07-21 RX ORDER — SPIRONOLACTONE 25 MG/1
12.5 TABLET ORAL DAILY
Qty: 30 TABLET | Refills: 0 | Status: SHIPPED | OUTPATIENT
Start: 2022-07-21 | End: 2022-08-25 | Stop reason: SINTOL

## 2022-08-16 ENCOUNTER — NURSE ONLY (OUTPATIENT)
Dept: CARDIOLOGY CLINIC | Age: 77
End: 2022-08-16
Payer: MEDICARE

## 2022-08-16 DIAGNOSIS — I49.5 SICK SINUS SYNDROME (HCC): ICD-10-CM

## 2022-08-16 DIAGNOSIS — Z95.0 CARDIAC PACEMAKER IN SITU: Primary | ICD-10-CM

## 2022-08-16 DIAGNOSIS — I48.0 PAROXYSMAL ATRIAL FIBRILLATION (HCC): ICD-10-CM

## 2022-08-16 PROCEDURE — 93296 REM INTERROG EVL PM/IDS: CPT | Performed by: INTERNAL MEDICINE

## 2022-08-16 PROCEDURE — 93294 REM INTERROG EVL PM/LDLS PM: CPT | Performed by: INTERNAL MEDICINE

## 2022-08-17 NOTE — PROGRESS NOTES
Remote transmission received from patient's dual chamber pacemaker monitor at home. Transmission shows normal sensing and pacing function. Noted AT/AF, <0.1% burden (sotalol, Xarelto). End of 91-day monitoring period 8/16/22. EP physician will review. See interrogation under cardiology tab in the [address] [place] [address] [address] field for more details. Will continue to monitor remotely.

## 2022-08-24 DIAGNOSIS — I48.0 PAROXYSMAL ATRIAL FIBRILLATION (HCC): ICD-10-CM

## 2022-08-24 LAB
ANION GAP SERPL CALCULATED.3IONS-SCNC: 12 MMOL/L (ref 3–16)
BUN BLDV-MCNC: 25 MG/DL (ref 7–20)
CALCIUM SERPL-MCNC: 9.1 MG/DL (ref 8.3–10.6)
CHLORIDE BLD-SCNC: 104 MMOL/L (ref 99–110)
CO2: 21 MMOL/L (ref 21–32)
CREAT SERPL-MCNC: 1.2 MG/DL (ref 0.8–1.3)
GFR AFRICAN AMERICAN: >60
GFR NON-AFRICAN AMERICAN: 58
GLUCOSE BLD-MCNC: 100 MG/DL (ref 70–99)
POTASSIUM SERPL-SCNC: 5.2 MMOL/L (ref 3.5–5.1)
SODIUM BLD-SCNC: 137 MMOL/L (ref 136–145)

## 2022-10-12 ENCOUNTER — OFFICE VISIT (OUTPATIENT)
Dept: CARDIOLOGY CLINIC | Age: 77
End: 2022-10-12

## 2022-10-12 VITALS
DIASTOLIC BLOOD PRESSURE: 70 MMHG | BODY MASS INDEX: 26.15 KG/M2 | WEIGHT: 172 LBS | HEART RATE: 60 BPM | SYSTOLIC BLOOD PRESSURE: 120 MMHG

## 2022-10-12 DIAGNOSIS — I10 ESSENTIAL HYPERTENSION: Primary | ICD-10-CM

## 2022-10-12 DIAGNOSIS — I35.0 NONRHEUMATIC AORTIC VALVE STENOSIS: ICD-10-CM

## 2022-10-12 DIAGNOSIS — I49.5 SSS (SICK SINUS SYNDROME) (HCC): ICD-10-CM

## 2022-10-12 DIAGNOSIS — I25.10 CAD IN NATIVE ARTERY: ICD-10-CM

## 2022-10-12 DIAGNOSIS — Z95.0 PACEMAKER: ICD-10-CM

## 2022-10-12 DIAGNOSIS — I50.32 CHRONIC DIASTOLIC CONGESTIVE HEART FAILURE (HCC): ICD-10-CM

## 2022-10-12 DIAGNOSIS — Z98.61 HISTORY OF PTCA: ICD-10-CM

## 2022-10-12 DIAGNOSIS — I48.0 PAROXYSMAL ATRIAL FIBRILLATION (HCC): ICD-10-CM

## 2022-10-12 PROCEDURE — G8428 CUR MEDS NOT DOCUMENT: HCPCS | Performed by: INTERNAL MEDICINE

## 2022-10-12 PROCEDURE — 99214 OFFICE O/P EST MOD 30 MIN: CPT | Performed by: INTERNAL MEDICINE

## 2022-10-12 PROCEDURE — 1123F ACP DISCUSS/DSCN MKR DOCD: CPT | Performed by: INTERNAL MEDICINE

## 2022-10-12 PROCEDURE — G8484 FLU IMMUNIZE NO ADMIN: HCPCS | Performed by: INTERNAL MEDICINE

## 2022-10-12 PROCEDURE — 1036F TOBACCO NON-USER: CPT | Performed by: INTERNAL MEDICINE

## 2022-10-12 PROCEDURE — G8417 CALC BMI ABV UP PARAM F/U: HCPCS | Performed by: INTERNAL MEDICINE

## 2022-10-12 ASSESSMENT — ENCOUNTER SYMPTOMS
CHOKING: 0
SHORTNESS OF BREATH: 0
COUGH: 0
APNEA: 0
CHEST TIGHTNESS: 0
ABDOMINAL DISTENTION: 0

## 2022-10-12 NOTE — PROGRESS NOTES
Subjective:      Patient ID: Lexx Giles is a 76 y.o. male. Coronary Artery Disease  Pertinent negatives include no chest pain, chest tightness, dizziness, leg swelling, palpitations or shortness of breath. His past medical history is significant for CHF. Hypertension  Pertinent negatives include no chest pain, palpitations or shortness of breath. Shortness of Breath  Pertinent negatives include no chest pain or leg swelling. His past medical history is significant for CAD. Congestive Heart Failure  Pertinent negatives include no chest pain, fatigue, palpitations or shortness of breath. His past medical history is significant for CAD. Here for follow up afib/sss/pacer/htn/cad/PTCA/CHF. Feeling good. Brief afib. Exercising without problem. Works out with  2x / wk. No pnd or orthopnea. No edema. Wt stable. BP better at home except when having shoulder pain. No chest pain. No sob. Rhythm good. No syncope. Past Medical History:   Diagnosis Date    Aortic stenosis 2015 Feb 2019 mean gradient 35mg Hg    [facility] 07/2012    Saw cardiologist for palps. Event recorder showed AF about 5% of the time. Warfarin started. Became persistent in March 2019 when admitted with pneumonia. CAD (coronary artery disease), native coronary artery Dec 18, 2007    Presented with palpitation to ER. Slight inc. in S. troponin. Cor. angio showed 90% L. circ. Stent placed. (Dr Heidy Andrew. Diabetes mellitus ([facility] [address] 1992    HbA1C in Jennifer '15 was 6.9 on metformin and glipizide. Diastolic congestive heart failure ([facility] [address] 08/2006    presented with wheeze, [id number]. cardiac cath to r/o ischemia. Diffuse CAD no critical stenoses. LVEF 55% LVEDP 31. Essential hypertension since 1980    Difficult to control with need for minoxidil in addition to ARB,CCB,BB and diuretic. Gout     Podagra intermittently over the years. Uric acid 9.4 in 2011.  Allopurinol started (with colchicine for 3 months) Hypothyroid March 2011    Total thyroidectomy for indeterminate nodule. Proved benign. Left carotid bruit Oct 2014    Discovered at routine exam. Carotid Doppler showed bilateral stenosis <50%    Obstructive sleep apnea 1997    as of 2019 still using CPAP    Peripheral neuropathy     2/2 DM. Pneumonia 25/6485    complicated by hypotension and overdiuresis leading to REYNALDO. S.Cr 1.7 on 3/9/19. Improved to 1.3 on 3/11/19    Sick sinus syndrome (HCC) 02/16/2017    PIP placed because of 7 sec pauses on sotalol. Past Surgical History:   Procedure Laterality Date    CHOLECYSTECTOMY, OPEN  1993    CORONARY ANGIOPLASTY  12/17/07    has stent per pt    DIAGNOSTIC CARDIAC CATH LAB PROCEDURE  12/17/07    PACEMAKER INSERTION  02/2017    Sick sinus sundrome with 7 sec pauses on sotalol. THYROIDECTOMY  2011    Benign    TOTAL KNEE ARTHROPLASTY Right 3/12/14     Social History     Socioeconomic History    Marital status: Single     Spouse name: Not on file    Number of children: Not on file    Years of education: Not on file    Highest education level: Not on file   Occupational History    Not on file   Tobacco Use    Smoking status: Never    Smokeless tobacco: Never   Substance and Sexual Activity    Alcohol use: No    Drug use: No    Sexual activity: Not Currently     Comment: Single   Other Topics Concern    Not on file   Social History Narrative    Not on file     Social Determinants of Health     Financial Resource Strain: Not on file   Food Insecurity: Not on file   Transportation Needs: Not on file   Physical Activity: Not on file   Stress: Not on file   Social Connections: Not on file   Intimate Partner Violence: Not on file   Housing Stability: Not on file       FH reviewed, denies FH cardiac issues. Vitals:    10/12/22 1328   BP: 120/70   Pulse: 60           Wt 170      Review of Systems   Constitutional:  Negative for activity change and fatigue.    Respiratory:  Negative for apnea, cough, choking, chest tightness and shortness of breath. Cardiovascular:  Negative for chest pain, palpitations and leg swelling. No PND or orthopnea. No tachycardia. Gastrointestinal:  Negative for abdominal distention. Musculoskeletal:  Negative for myalgias. Neurological:  Negative for dizziness, syncope and light-headedness. Psychiatric/Behavioral:  Negative for agitation, behavioral problems and confusion. All other systems reviewed negative as done. Objective:   Physical Exam  Constitutional:       General: He is not in acute distress. Appearance: He is well-developed. HENT:      Head: Normocephalic and atraumatic. Eyes:      General:         Right eye: No discharge. Left eye: No discharge. Conjunctiva/sclera: Conjunctivae normal.   Neck:      Vascular: No JVD. Cardiovascular:      Rate and Rhythm: Normal rate and regular rhythm. Pulses:           Radial pulses are 2+ on the right side and 2+ on the left side. Heart sounds: S1 normal and S2 normal. Murmur heard. No gallop. Comments: 2/6 syst M  Pulmonary:      Effort: Pulmonary effort is normal.      Breath sounds: Normal breath sounds. No wheezing or rales. Abdominal:      General: Bowel sounds are normal.      Palpations: Abdomen is soft. Tenderness: There is no abdominal tenderness. Musculoskeletal:         General: Normal range of motion. Cervical back: Normal range of motion and neck supple. Skin:     General: Skin is warm and dry. Neurological:      Mental Status: He is alert and oriented to person, place, and time. Psychiatric:         Behavior: Behavior normal.         Thought Content: Thought content normal.       Assessment:         Diagnosis Orders   1. Essential hypertension        2. CAD in native artery        3. History of PTCA        4. Paroxysmal atrial fibrillation ([facility])        5. SSS (sick sinus syndrome) ([facility])        6. Pacemaker        7.  Chronic diastolic congestive heart failure ([address]        8. Nonrheumatic aortic valve stenosis                Plan:      CV stable. Rhythm stable. BP up recently but has been eating processed soup. EP following pacemaker/afib. Rhythm stable. Exercising 2x per wik with . Wt stable. Will continue sotalol for afib. On AC, no bleeding issues. Continue on Xarelto 15 mg qd. Will continue Aldactone/lasix. Recommended diet, exercise and wt loss. Reviewed previous records and testing including myoview 7/20 and echo 3/22. No changes. Continue to monitor. Follow up 3 months.

## 2022-12-06 ENCOUNTER — NURSE ONLY (OUTPATIENT)
Dept: CARDIOLOGY CLINIC | Age: 77
End: 2022-12-06

## 2022-12-06 DIAGNOSIS — Z95.0 CARDIAC PACEMAKER IN SITU: Primary | ICD-10-CM

## 2022-12-06 DIAGNOSIS — I49.5 SICK SINUS SYNDROME (HCC): ICD-10-CM

## 2022-12-08 NOTE — PROGRESS NOTES
Remote transmission received from patient's dual chamber pacemaker monitor at home. Transmission shows normal sensing and pacing function. Noted AT/AF/L, 1.2% burden (sotalol, Xarelto). Ap 98.0%   0.2% (MVP On)  PVCs 5.8/hr    End of 91-day monitoring period 12/6/22. EP physician will review. See interrogation under cardiology tab in the [address] [place] [address] [address] field for more details.

## 2022-12-23 NOTE — PROGRESS NOTES
[facility]   Cardiac Electrophysiology  Date: 12/29/2022      Chief Complaint:   Chief Complaint   Patient presents with    Follow-up     6 Month follow up         HPI: Anne Palomo is a 76 y.o. male . Patient has hx of CAD s/p PCI x 1 (2007) by Dr Brittany Augustin, mild AS, BRAD on nasal CPAP, NIDDM, hypothyroidism s/p total thyroidectomy, HTN, gout, PAF & SSS, S/p dual-chamber Medtronic PPM on 2/15/2017 (Dr. Polina Quintanilla). He has  mild AS, normal LVEF, CAD (PCI in 2007 x 1), symptomatic AF and what appeared to be symptomatic post conversion pauses while on large dose of fito agents. D/c'd BB, started dilt and low dose sotalol with low AF burden. Noted to have greater than 7 second pause on outpatient ambulatory monitor. Negative stress test in 2016. His sotalol was increased to 80mg BID in March in the hospital when he was admitted for CHF. ECHO 5/15/20 LVEF 55-60%. Interval History: Today,  French Weiss presents in follow up regarding his atrial fibrillation. He feels his heart when he lays  on his left side and is not sure if this is afib. He feels well and thinks he may have had an episode at the September. He has been feeling more episodes since then. He has lost about 40lbs in the last year over 4 months. He followed at a diet clinic. He has kept the weight off for over a year and is off his glipizide and his BP meds. In the past he was symptomatic with feeling an irregular heart beat and feels he has had fewer \" issues \" with afib in the last year. He has been compliant with his medication. Assessment:  Paroxysmal atrial fibrillation    EKG today  Sinus    Buckner on device:  0.7% - 59 episode. Longest was 4 hours. His interrogation shows that he has had increasing episodes over the last few months.     Continue Sotalol     -444  QTC   Patient has a [id number] Score of 5 ( age, CAD, HTN,DM)    - continue xarelto 15 mg - no s/s bleeding     - creatinine Cl 60 ml/min based on creatinine 1.1 11/07/2022   TSH 1.09 04/11/2022  - on synthroid    - Treatment options including cardioversion, rate control strategy, antiarrhythmics, anticoagulation and possible ablation were discussed with patient. Risks, benefits and alternative of each treatment options were explained. All questions answered   . - without external factors to attribute increased episodes to, I recommend further  treatment - ablation or medicaiton . Consider - dofetilide    -  we discussed risks and benefits of both ablation and dofetilide. He would like to try switching medications first .    -  he would like to wait until last week of January    He will stop sotalol at least 3 days before initiation of Tikosyn. We will plan to start him on Tikosyn 250 mcg p.o. every 12 hours. He is on Lasix and hence, we will have to be cautious about his electrolytes. His potassium was 5.2 in the past and hence, spironolactone was discontinued. He is on trazodone 100 mg daily. He does have interactions with Tikosyn. Hence, this needs to be monitored closely, during the hospital stay. Sinus Pause/sick Sinus Syndrome    -  dual chamber PPM implant 2/15/17  Dr. Fady Márquez was interrogated and programmed and I supervised and reviewed all the data. All findings and changes are in device interrogation sheet and reflect my personal interpretation and changes and is scanned to Epic. 2.99  years remaining, AP 98.7% ,  <0.1% %   0.7 % AT/AF burden per device interrogation today. Increasing episodes noted since the first week of October - He states he started feeling his afib at that time.     -   Coronary artery disease    - h/o PCI to Lcx 2007   - 07/08/2020  NM Stress  - low risk scan     - follows with Dr. Arya Mendoza     HTN  -Controlled  -BP goal <130/80  -Home BP monitoring encouraged, printed information provided on how to accurately measure BP at home.  -Counseled to follow a low salt diet to assure blood pressure remains controlled for cardiovascular risk factor modification.   -The patient is counseled to get regular exercise 3-5 times per week and maintain a healthy weight reduce cardiovascular risk factors. - continue current medication        BRAD  -Stable: Uses CPAP/Bipap/APAP  -Encourage to use machine to prevent long term effects of untreated BRAD     Plan:  Admit for dofetilide loading  24th January        Past Medical History:   Diagnosis Date    Aortic stenosis 2015 Feb 2019 mean gradient 35mg Hg    Atrial fibrillation ([facility] [address] 07/2012    Saw cardiologist for palps. Event recorder showed AF about 5% of the time. Warfarin started. Became persistent in March 2019 when admitted with pneumonia. CAD (coronary artery disease), native coronary artery Dec 18, 2007    Presented with palpitation to ER. Slight inc. in S. troponin. Cor. angio showed 90% L. circ. Stent placed. (Dr Delfina Rose. Diabetes mellitus ([facility] [address] 1992    HbA1C in Jennifer '15 was 6.9 on metformin and glipizide. Diastolic congestive heart failure ([facility] [address] 08/2006    presented with wheeze, [id number]. cardiac cath to r/o ischemia. Diffuse CAD no critical stenoses. LVEF 55% LVEDP 31. Essential hypertension since 1980    Difficult to control with need for minoxidil in addition to ARB,CCB,BB and diuretic. Gout     Podagra intermittently over the years. Uric acid 9.4 in 2011. Allopurinol started (with colchicine for 3 months)    Hypothyroid March 2011    Total thyroidectomy for indeterminate nodule. Proved benign. Left carotid bruit Oct 2014    Discovered at routine exam. Carotid Doppler showed bilateral stenosis <50%    Obstructive sleep apnea 1997    as of 2019 still using CPAP    Peripheral neuropathy     2/2 DM. Pneumonia 83/6049    complicated by hypotension and overdiuresis leading to REYNALDO. S.Cr 1.7 on 3/9/19. Improved to 1.3 on 3/11/19    Sick sinus syndrome (HCC) 02/16/2017    PIP placed because of 7 sec pauses on sotalol.         Past Surgical History: Procedure Laterality Date    CHOLECYSTECTOMY, OPEN  1993    CORONARY ANGIOPLASTY  12/17/07    has stent per pt    DIAGNOSTIC CARDIAC CATH LAB PROCEDURE  12/17/07    PACEMAKER INSERTION  02/2017    Sick sinus sundrome with 7 sec pauses on sotalol. THYROIDECTOMY  2011    Benign    TOTAL KNEE ARTHROPLASTY Right 3/12/14       Allergies: Allergies   Allergen Reactions    Erythromycin Hives and Swelling     Swelling of hands and feet, all-over hives. Spironolactone Other (See Comments)     hyperkalemia       Medication:   Prior to Admission medications    Medication Sig Start Date End Date Taking? Authorizing Provider   rivaroxaban (XARELTO) 15 MG TABS tablet Take 15 mg by mouth   Yes Historical Provider, MD   Multiple Vitamins-Minerals (PRESERVISION AREDS PO) Take 2 capsules by mouth 2 times daily   Yes Historical Provider, MD   ipratropium (ATROVENT) 0.03 % nasal spray 2 sprays by Each Nostril route every 12 hours   Yes Historical Provider, MD   furosemide (LASIX) 20 MG tablet Take 1 tablet by mouth daily 6/7/21  Yes Brandy Payne MD   sotalol (BETAPACE) 80 MG tablet TAKE 1 TABLET BY MOUTH 2 TIMES DAILY 4/2/21  Yes Mariel Osorio MD   oxybutynin (DITROPAN) 5 MG/5ML syrup Take 5 mg by mouth as needed    Yes Historical Provider, MD   tamsulosin (FLOMAX) 0.4 MG capsule Take 0.4 mg by mouth nightly   Yes Historical Provider, MD   traZODone (DESYREL) 100 MG tablet Take 100 mg by mouth nightly   Yes Historical Provider, MD   atorvastatin (LIPITOR) 40 MG tablet Take 40 mg by mouth nightly    Yes Historical Provider, MD   aspirin 81 MG tablet Take 81 mg by mouth daily. Yes Historical Provider, MD   levothyroxine (SYNTHROID) 150 MCG tablet Take 150 mcg by mouth daily. Yes Historical Provider, MD   allopurinol (ZYLOPRIM) 300 MG tablet Take 300 mg by mouth nightly    Yes Historical Provider, MD   gemfibrozil (LOPID) 600 MG tablet Take 600 mg by mouth 2 times daily (before meals).      Yes Historical Provider, MD   metformin (GLUCOPHAGE) 1000 MG tablet Take 1,000 mg by mouth 2 times daily (with meals). Yes Historical Provider, MD       Social History:   reports that he has never smoked. He has never used smokeless tobacco. He reports that he does not drink alcohol and does not use drugs. Family History:  family history includes Heart Attack in his maternal grandfather; Heart Defect in his maternal grandfather and mother; Heart Disease in his father; Heart Surgery in his father; Pacemaker in his mother. Reviewed. Denies family history of sudden cardiac death, arrhythmia, premature CAD    Review of System:    General ROS: negative for - chills, fever   Psychological ROS: negative for - anxiety or depression  Ophthalmic ROS: negative for - eye pain or loss of vision  ENT ROS: negative for - epistaxis, headaches, nasal discharge, sore throat   Allergy and Immunology ROS: negative for - hives, nasal congestion   Hematological and Lymphatic ROS: negative for - bleeding problems, blood clots, bruising or jaundice  Endocrine ROS: negative for - skin changes, temperature intolerance or unexpected weight changes  Respiratory ROS: negative for - cough, hemoptysis, pleuritic pain, SOB, sputum changes or wheezing  Cardiovascular ROS: Per HPI. Gastrointestinal ROS: negative for - abdominal pain, blood in stools, diarrhea, hematemesis, melena, nausea/vomiting or swallowing difficulty/pain  Genito-Urinary ROS: negative for - dysuria or incontinence  Musculoskeletal ROS: negative for - joint swelling or muscle pain  Neurological ROS: negative for - confusion, dizziness, gait disturbance, headaches, numbness/tingling, seizures, speech problems, tremors, visual changes or weakness  Dermatological ROS: negative for - rash    Physical Examination:  Vitals:    12/29/22 1355   BP: 122/80   Pulse: 74       Constitutional: Oriented. No distress. Head: Normocephalic and atraumatic.    Mouth/Throat: Oropharynx is clear and moist.   Eyes: Conjunctivae normal. EOM are normal.   Neck: Normal range of motion. Neck supple. No rigidity. No JVD present. Cardiovascular: Normal rate, regular rhythm, S1&S2 and intact distal pulses. Pulmonary/Chest: Bilateral respiratory sounds. No wheezes. No rhonchi. Abdominal: Soft. Bowel sounds present. No distension, No tenderness. Musculoskeletal: No tenderness. No edema    Lymphadenopathy: Has no cervical adenopathy. Neurological: Alert and oriented. Cranial nerve appears intact, No Gross deficit   Skin: Skin is warm and dry. No rash noted. Psychiatric: Has a normal mood, affect and behavior     Labs:  Reviewed. ECG: reviewed, 74Sinus  Rhythm, with QRS duration 88 ms. No pathologic Q waves, ventricular pre-excitation, or QT prolongation. Studies:   1. Event monitor: 11/20/2015 - afib burden 1%, fastest  aib rate 121 bpm, 7.5 second pause     2. Echo:  2/29/22   Summary   Left ventricular cavity size is normal.   There is mild concentric left ventricular hypertrophy. Left ventricular function is normal with ejection fraction estimated at   60-65%. No regional wall motion abnormalities are noted. Diastolic filling parameters suggest grade II diastolic dysfunction. Global L. Strain = -17.2%. Mitral annular calcification is present. Mild mitral regurgitation is present. Bi-atrial enlargement. Moderate aortic stenosis. Mild to moderate aortic regurgitation. Mild tricuspid regurgitation. Estimated pulmonary artery systolic pressure is at 43 mmHg assuming a right   atrial pressure of 3 mmHg       3. Stress Test:  7.8.2020  Summary   There is normal isotope uptake at stress and rest. There is no evidence of   myocardial ischemia or scar. Normal LV size and systolic function. Left ventricular ejection fraction of 73 %. There are no regional wall motion abnormalities. Overall findings represent a low risk scan        4. Cath:      The MCOT, echocardiogram, stress test, and coronary angiography/PCI were reviewed by myself and used for my plan of care. - The patient is counseled to follow a low salt diet to assure blood pressure remains controlled for cardiovascular risk factor modification.   - The patient is counseled to avoid excess caffeine, and energy drinks as this may exacerbated ectopy and arrhythmia. - The patient is counseled to get regular exercise 3-5 times per week to control cardiovascular risk factors. - The patient is counseled to lose weigt to control cardiovascular risk factors. -The patient is counseled about the health hazards of smoking including cardiovascular side effects and its impact on morbidity and mortality. Thank you for allowing me to participate in the care of Rhoda Garrett. All questions and concerns were addressed to the patient/family. Alternatives to my treatment were discussed. Kiersten Foley RN, am scribing for and in the presence of Dr. Helen Sanford. 12/29/22 2:30 PM  Jefferson Hopkins RN    I, Anais Alfred MD, personally performed the services prescribed in this documentation as scribed by Ms. Murray Gooden RN in my presence and it is both accurate and complete.        Anais Alfred MD  Cardiac Electrophysiology  [facility]

## 2022-12-29 ENCOUNTER — NURSE ONLY (OUTPATIENT)
Dept: CARDIOLOGY CLINIC | Age: 77
End: 2022-12-29

## 2022-12-29 ENCOUNTER — OFFICE VISIT (OUTPATIENT)
Dept: CARDIOLOGY CLINIC | Age: 77
End: 2022-12-29
Payer: MEDICARE

## 2022-12-29 VITALS
BODY MASS INDEX: 26 KG/M2 | SYSTOLIC BLOOD PRESSURE: 122 MMHG | WEIGHT: 171 LBS | HEART RATE: 74 BPM | DIASTOLIC BLOOD PRESSURE: 80 MMHG

## 2022-12-29 DIAGNOSIS — I49.5 SICK SINUS SYNDROME (HCC): ICD-10-CM

## 2022-12-29 DIAGNOSIS — I48.0 PAROXYSMAL ATRIAL FIBRILLATION (HCC): Primary | ICD-10-CM

## 2022-12-29 DIAGNOSIS — I10 ESSENTIAL HYPERTENSION: ICD-10-CM

## 2022-12-29 DIAGNOSIS — I25.10 CORONARY ARTERY DISEASE INVOLVING NATIVE CORONARY ARTERY OF NATIVE HEART WITHOUT ANGINA PECTORIS: ICD-10-CM

## 2022-12-29 PROCEDURE — 93000 ELECTROCARDIOGRAM COMPLETE: CPT | Performed by: INTERNAL MEDICINE

## 2022-12-29 PROCEDURE — 1036F TOBACCO NON-USER: CPT | Performed by: INTERNAL MEDICINE

## 2022-12-29 PROCEDURE — 3078F DIAST BP <80 MM HG: CPT | Performed by: INTERNAL MEDICINE

## 2022-12-29 PROCEDURE — 1123F ACP DISCUSS/DSCN MKR DOCD: CPT | Performed by: INTERNAL MEDICINE

## 2022-12-29 PROCEDURE — G8484 FLU IMMUNIZE NO ADMIN: HCPCS | Performed by: INTERNAL MEDICINE

## 2022-12-29 PROCEDURE — 99215 OFFICE O/P EST HI 40 MIN: CPT | Performed by: INTERNAL MEDICINE

## 2022-12-29 PROCEDURE — G8427 DOCREV CUR MEDS BY ELIG CLIN: HCPCS | Performed by: INTERNAL MEDICINE

## 2022-12-29 PROCEDURE — G8417 CALC BMI ABV UP PARAM F/U: HCPCS | Performed by: INTERNAL MEDICINE

## 2022-12-29 PROCEDURE — 3074F SYST BP LT 130 MM HG: CPT | Performed by: INTERNAL MEDICINE

## 2022-12-29 NOTE — LETTER
[facility]  EP Procedure Sheet  1/3/23           [id number]  Samuel Boy  1945    Physician: Dr. Roland Ramirez    EP Procedures   Pacemaker implant  EP Study    ICD implant  Atrial flutter ablation     Biv implant  Atrial fibrillation ablation    Generator Change  SVT ablation    Lead revision  VT ablation    Lead extraction +/- upgrade  AVN ablation    Loop implant x Cardioversion - none (PAF)   x Other: dofetilide loading 1/24/22 at 0700  ALTA     Equipment   Medtronic   ITZ Mapping System    [employer] [employer]  [address] [address]  CryoAblation    Biotronik  Laser Lead Extraction    Special Equipment       EP Procedures Scheduling Request  Time Requested     Specific Day    Anesthesia    CT surgery backup    Location [facility]     Pre-Procedure Labs / Imaging   PT/INR  Type & cross    CBC  Units PRBC    BMP/Mg  Units FFP    Venogram  CXR    Echo  Pulmonary CTA for Pulmonary vein mapping

## 2023-01-03 ENCOUNTER — TELEPHONE (OUTPATIENT)
Dept: CARDIOLOGY CLINIC | Age: 78
End: 2023-01-03

## 2023-01-03 DIAGNOSIS — I48.0 PAROXYSMAL ATRIAL FIBRILLATION (HCC): Primary | ICD-10-CM

## 2023-01-03 NOTE — TELEPHONE ENCOUNTER
LVM for the pt to let him know he is scheduled for admission for his dofetilide loading. If he has any questions to contact the office.      Procedure- Dofetilide Loading  Date: 1/24/2023  Arrival time: 7:00 am      Teams updated / PCU notified / Jayson Lei alerted Shiv Ge)

## 2023-01-03 NOTE — LETTER
[address]  EP Procedure Sheet  1/3/23           [id number]  Tremaine Mckeonuton  1945    Physician: Dr. Peña Haynes    EP Procedures   Pacemaker implant  EP Study    ICD implant  Atrial flutter ablation     Biv implant  Atrial fibrillation ablation    Generator Change  SVT ablation    Lead revision  VT ablation    Lead extraction +/- upgrade  AVN ablation    Loop implant  Cardioversion - NONE (PAF)   x Other:admission for dofetilide loading on 2/6/23 at 0700   ALTA     Equipment  x Medtronic   ITZ Mapping System    [facility] [employer]  [address] [address]  CryoAblation    Biotronik  Laser Lead Extraction    Special Equipment       EP Procedures Scheduling Request  Time Requested     Specific Day    Anesthesia    CT surgery backup    Location [facility]     Pre-Procedure Labs / Imaging   PT/INR  Type & cross    CBC  Units PRBC    BMP/Mg  Units FFP    Venogram  CXR    Echo  Pulmonary CTA for Pulmonary vein mapping

## 2023-01-03 NOTE — TELEPHONE ENCOUNTER
Due to schedule conflict, pt will need rescheduled for dofetilide loading for 2/6/23. Spoke with pt and agreeable to the date change. Reviewed instructions with pt and posted to [facility]. Pt verbalized understanding. New EP form created and labs ordered.

## 2023-01-12 NOTE — PROGRESS NOTES
Patient comes in for a programming evaluation on their Medtronic dual chamber pacemaker interrogated by a [employer] company rep. Sensing and pacing thresholds appears unchanged since last in office check on 6.7.2022. AT/AF noted with a burden of 0.7%. Patient remains on xarelto, furosemide, sotalol. EP physician will review. See Paceart report under the Cardiology tab. AP 98.7%  0.1%    Patient will see Dr. Lynne Roberts today. We will continue to monitor remotely.

## 2023-02-01 DIAGNOSIS — I48.0 PAROXYSMAL ATRIAL FIBRILLATION (HCC): ICD-10-CM

## 2023-02-01 RX ORDER — FUROSEMIDE 20 MG/1
TABLET ORAL
Qty: 270 TABLET | Refills: 3 | Status: SHIPPED | OUTPATIENT
Start: 2023-02-01

## 2023-02-02 LAB
A/G RATIO: 1.9 (ref 1.1–2.2)
ALBUMIN SERPL-MCNC: 4.1 G/DL (ref 3.4–5)
ALP BLD-CCNC: 124 U/L (ref 40–129)
ALT SERPL-CCNC: 10 U/L (ref 10–40)
ANION GAP SERPL CALCULATED.3IONS-SCNC: 15 MMOL/L (ref 3–16)
AST SERPL-CCNC: 21 U/L (ref 15–37)
BILIRUB SERPL-MCNC: 0.3 MG/DL (ref 0–1)
BUN BLDV-MCNC: 30 MG/DL (ref 7–20)
CALCIUM SERPL-MCNC: 9.3 MG/DL (ref 8.3–10.6)
CHLORIDE BLD-SCNC: 97 MMOL/L (ref 99–110)
CO2: 23 MMOL/L (ref 21–32)
CREAT SERPL-MCNC: 1.1 MG/DL (ref 0.8–1.3)
GFR SERPL CREATININE-BSD FRML MDRD: >60 ML/MIN/{1.73_M2}
GLUCOSE BLD-MCNC: 138 MG/DL (ref 70–99)
HCT VFR BLD CALC: 39.7 % (ref 40.5–52.5)
HEMOGLOBIN: 12.8 G/DL (ref 13.5–17.5)
INR BLD: 1.26 (ref 0.87–1.14)
MAGNESIUM: 1.6 MG/DL (ref 1.8–2.4)
MCH RBC QN AUTO: 31.5 PG (ref 26–34)
MCHC RBC AUTO-ENTMCNC: 32.2 G/DL (ref 31–36)
MCV RBC AUTO: 97.8 FL (ref 80–100)
PDW BLD-RTO: 14.9 % (ref 12.4–15.4)
PLATELET # BLD: 255 K/UL (ref 135–450)
PMV BLD AUTO: 10.7 FL (ref 5–10.5)
POTASSIUM SERPL-SCNC: 4.4 MMOL/L (ref 3.5–5.1)
PROTHROMBIN TIME: 15.7 SEC (ref 11.7–14.5)
RBC # BLD: 4.06 M/UL (ref 4.2–5.9)
SODIUM BLD-SCNC: 135 MMOL/L (ref 136–145)
TOTAL PROTEIN: 6.3 G/DL (ref 6.4–8.2)
WBC # BLD: 5.8 K/UL (ref 4–11)

## 2023-02-02 RX ORDER — MAGNESIUM OXIDE 400 MG/1
400 TABLET ORAL 2 TIMES DAILY
Qty: 60 TABLET | Refills: 5 | Status: SHIPPED | OUTPATIENT
Start: 2023-02-02

## 2023-02-06 ENCOUNTER — HOSPITAL ENCOUNTER (INPATIENT)
Age: 78
LOS: 3 days | Discharge: HOME | DRG: 309 | End: 2023-02-09
Attending: INTERNAL MEDICINE | Admitting: INTERNAL MEDICINE
Payer: MEDICARE

## 2023-02-06 DIAGNOSIS — I48.0 PAROXYSMAL ATRIAL FIBRILLATION (HCC): Primary | ICD-10-CM

## 2023-02-06 PROBLEM — I48.91 A-FIB (HCC): Status: ACTIVE | Noted: 2023-02-06

## 2023-02-06 LAB
ANION GAP SERPL CALCULATED.3IONS-SCNC: 15 MMOL/L (ref 3–16)
BUN BLDV-MCNC: 35 MG/DL (ref 7–20)
CALCIUM SERPL-MCNC: 9.3 MG/DL (ref 8.3–10.6)
CHLORIDE BLD-SCNC: 99 MMOL/L (ref 99–110)
CO2: 20 MMOL/L (ref 21–32)
CREAT SERPL-MCNC: 1.3 MG/DL (ref 0.8–1.3)
EKG ATRIAL RATE: 70 BPM
EKG ATRIAL RATE: 81 BPM
EKG DIAGNOSIS: NORMAL
EKG DIAGNOSIS: NORMAL
EKG P AXIS: 14 DEGREES
EKG P AXIS: 72 DEGREES
EKG P-R INTERVAL: 236 MS
EKG P-R INTERVAL: 278 MS
EKG Q-T INTERVAL: 412 MS
EKG Q-T INTERVAL: 456 MS
EKG QRS DURATION: 86 MS
EKG QRS DURATION: 92 MS
EKG QTC CALCULATION (BAZETT): 478 MS
EKG QTC CALCULATION (BAZETT): 492 MS
EKG R AXIS: 31 DEGREES
EKG R AXIS: 76 DEGREES
EKG T AXIS: 12 DEGREES
EKG T AXIS: 19 DEGREES
EKG VENTRICULAR RATE: 70 BPM
EKG VENTRICULAR RATE: 81 BPM
GFR SERPL CREATININE-BSD FRML MDRD: 56 ML/MIN/{1.73_M2}
GLUCOSE BLD-MCNC: 155 MG/DL (ref 70–99)
GLUCOSE BLD-MCNC: 225 MG/DL (ref 70–99)
GLUCOSE BLD-MCNC: 230 MG/DL (ref 70–99)
INR BLD: 1.58 (ref 0.87–1.14)
MAGNESIUM: 1.6 MG/DL (ref 1.8–2.4)
PERFORMED ON: ABNORMAL
PERFORMED ON: ABNORMAL
POTASSIUM SERPL-SCNC: 3.7 MMOL/L (ref 3.5–5.1)
PROTHROMBIN TIME: 18.9 SEC (ref 11.7–14.5)
SODIUM BLD-SCNC: 134 MMOL/L (ref 136–145)

## 2023-02-06 PROCEDURE — 6360000002 HC RX W HCPCS: Performed by: INTERNAL MEDICINE

## 2023-02-06 PROCEDURE — 83036 HEMOGLOBIN GLYCOSYLATED A1C: CPT

## 2023-02-06 PROCEDURE — 99223 1ST HOSP IP/OBS HIGH 75: CPT | Performed by: NURSE PRACTITIONER

## 2023-02-06 PROCEDURE — 85610 PROTHROMBIN TIME: CPT

## 2023-02-06 PROCEDURE — 2060000000 HC ICU INTERMEDIATE R&B

## 2023-02-06 PROCEDURE — 6360000002 HC RX W HCPCS: Performed by: NURSE PRACTITIONER

## 2023-02-06 PROCEDURE — 93010 ELECTROCARDIOGRAM REPORT: CPT | Performed by: INTERNAL MEDICINE

## 2023-02-06 PROCEDURE — 36415 COLL VENOUS BLD VENIPUNCTURE: CPT

## 2023-02-06 PROCEDURE — 93005 ELECTROCARDIOGRAM TRACING: CPT | Performed by: INTERNAL MEDICINE

## 2023-02-06 PROCEDURE — 80048 BASIC METABOLIC PNL TOTAL CA: CPT

## 2023-02-06 PROCEDURE — 93005 ELECTROCARDIOGRAM TRACING: CPT | Performed by: NURSE PRACTITIONER

## 2023-02-06 PROCEDURE — 83735 ASSAY OF MAGNESIUM: CPT

## 2023-02-06 PROCEDURE — 6370000000 HC RX 637 (ALT 250 FOR IP): Performed by: NURSE PRACTITIONER

## 2023-02-06 RX ORDER — TRAZODONE HYDROCHLORIDE 100 MG/1
100 TABLET ORAL NIGHTLY
Status: DISCONTINUED | OUTPATIENT
Start: 2023-02-06 | End: 2023-02-09 | Stop reason: HOSPADM

## 2023-02-06 RX ORDER — DEXTROSE MONOHYDRATE 100 MG/ML
INJECTION, SOLUTION INTRAVENOUS CONTINUOUS PRN
Status: DISCONTINUED | OUTPATIENT
Start: 2023-02-06 | End: 2023-02-06 | Stop reason: SDUPTHER

## 2023-02-06 RX ORDER — ACETAMINOPHEN 325 MG/1
650 TABLET ORAL EVERY 4 HOURS PRN
Status: DISCONTINUED | OUTPATIENT
Start: 2023-02-06 | End: 2023-02-09 | Stop reason: HOSPADM

## 2023-02-06 RX ORDER — ALLOPURINOL 300 MG/1
300 TABLET ORAL NIGHTLY
Status: DISCONTINUED | OUTPATIENT
Start: 2023-02-06 | End: 2023-02-09 | Stop reason: HOSPADM

## 2023-02-06 RX ORDER — POTASSIUM CHLORIDE 20 MEQ/1
40 TABLET, EXTENDED RELEASE ORAL ONCE
Status: COMPLETED | OUTPATIENT
Start: 2023-02-06 | End: 2023-02-06

## 2023-02-06 RX ORDER — INSULIN LISPRO 100 [IU]/ML
0-4 INJECTION, SOLUTION INTRAVENOUS; SUBCUTANEOUS
Status: DISCONTINUED | OUTPATIENT
Start: 2023-02-06 | End: 2023-02-09 | Stop reason: HOSPADM

## 2023-02-06 RX ORDER — LANOLIN ALCOHOL/MO/W.PET/CERES
400 CREAM (GRAM) TOPICAL 2 TIMES DAILY
Status: DISCONTINUED | OUTPATIENT
Start: 2023-02-06 | End: 2023-02-09 | Stop reason: HOSPADM

## 2023-02-06 RX ORDER — GLUCAGON 1 MG/ML
1 KIT INJECTION PRN
Status: DISCONTINUED | OUTPATIENT
Start: 2023-02-06 | End: 2023-02-06 | Stop reason: SDUPTHER

## 2023-02-06 RX ORDER — LEVOTHYROXINE SODIUM 0.15 MG/1
150 TABLET ORAL DAILY
Status: DISCONTINUED | OUTPATIENT
Start: 2023-02-06 | End: 2023-02-09 | Stop reason: HOSPADM

## 2023-02-06 RX ORDER — IPRATROPIUM BROMIDE 21 UG/1
2 SPRAY, METERED NASAL EVERY 12 HOURS
Status: DISCONTINUED | OUTPATIENT
Start: 2023-02-06 | End: 2023-02-09 | Stop reason: HOSPADM

## 2023-02-06 RX ORDER — MAGNESIUM SULFATE IN WATER 40 MG/ML
4000 INJECTION, SOLUTION INTRAVENOUS ONCE
Status: COMPLETED | OUTPATIENT
Start: 2023-02-06 | End: 2023-02-06

## 2023-02-06 RX ORDER — DEXTROSE MONOHYDRATE 100 MG/ML
INJECTION, SOLUTION INTRAVENOUS CONTINUOUS PRN
Status: DISCONTINUED | OUTPATIENT
Start: 2023-02-06 | End: 2023-02-09 | Stop reason: HOSPADM

## 2023-02-06 RX ORDER — GEMFIBROZIL 600 MG/1
600 TABLET, FILM COATED ORAL
Status: DISCONTINUED | OUTPATIENT
Start: 2023-02-06 | End: 2023-02-09 | Stop reason: HOSPADM

## 2023-02-06 RX ORDER — FUROSEMIDE 20 MG/1
20 TABLET ORAL DAILY
Status: DISCONTINUED | OUTPATIENT
Start: 2023-02-06 | End: 2023-02-09 | Stop reason: HOSPADM

## 2023-02-06 RX ORDER — HYDRALAZINE HYDROCHLORIDE 20 MG/ML
10 INJECTION INTRAMUSCULAR; INTRAVENOUS EVERY 6 HOURS PRN
Status: DISCONTINUED | OUTPATIENT
Start: 2023-02-06 | End: 2023-02-09 | Stop reason: HOSPADM

## 2023-02-06 RX ORDER — GLUCAGON 1 MG/ML
1 KIT INJECTION PRN
Status: DISCONTINUED | OUTPATIENT
Start: 2023-02-06 | End: 2023-02-09 | Stop reason: HOSPADM

## 2023-02-06 RX ORDER — INSULIN LISPRO 100 [IU]/ML
0-4 INJECTION, SOLUTION INTRAVENOUS; SUBCUTANEOUS NIGHTLY
Status: DISCONTINUED | OUTPATIENT
Start: 2023-02-06 | End: 2023-02-09 | Stop reason: HOSPADM

## 2023-02-06 RX ORDER — AMLODIPINE BESYLATE 5 MG/1
5 TABLET ORAL DAILY
Status: DISCONTINUED | OUTPATIENT
Start: 2023-02-06 | End: 2023-02-07

## 2023-02-06 RX ORDER — DOFETILIDE 0.12 MG/1
250 CAPSULE ORAL EVERY 12 HOURS SCHEDULED
Status: DISCONTINUED | OUTPATIENT
Start: 2023-02-06 | End: 2023-02-07

## 2023-02-06 RX ORDER — ASPIRIN 81 MG/1
81 TABLET ORAL DAILY
Status: DISCONTINUED | OUTPATIENT
Start: 2023-02-06 | End: 2023-02-09 | Stop reason: HOSPADM

## 2023-02-06 RX ORDER — ATORVASTATIN CALCIUM 40 MG/1
40 TABLET, FILM COATED ORAL NIGHTLY
Status: DISCONTINUED | OUTPATIENT
Start: 2023-02-06 | End: 2023-02-09 | Stop reason: HOSPADM

## 2023-02-06 RX ORDER — TAMSULOSIN HYDROCHLORIDE 0.4 MG/1
0.4 CAPSULE ORAL NIGHTLY
Status: DISCONTINUED | OUTPATIENT
Start: 2023-02-06 | End: 2023-02-09 | Stop reason: HOSPADM

## 2023-02-06 RX ADMIN — DOFETILIDE 250 MCG: 0.12 CAPSULE ORAL at 12:45

## 2023-02-06 RX ADMIN — ATORVASTATIN CALCIUM 40 MG: 40 TABLET, FILM COATED ORAL at 20:55

## 2023-02-06 RX ADMIN — TAMSULOSIN HYDROCHLORIDE 0.4 MG: 0.4 CAPSULE ORAL at 20:55

## 2023-02-06 RX ADMIN — MAGNESIUM SULFATE HEPTAHYDRATE 4000 MG: 40 INJECTION, SOLUTION INTRAVENOUS at 10:43

## 2023-02-06 RX ADMIN — IPRATROPIUM BROMIDE 2 SPRAY: 21 SPRAY NASAL at 10:35

## 2023-02-06 RX ADMIN — ASPIRIN 81 MG: 81 TABLET, COATED ORAL at 10:14

## 2023-02-06 RX ADMIN — TRAZODONE HYDROCHLORIDE 100 MG: 100 TABLET ORAL at 23:30

## 2023-02-06 RX ADMIN — GEMFIBROZIL 600 MG: 600 TABLET ORAL at 18:54

## 2023-02-06 RX ADMIN — FUROSEMIDE 20 MG: 20 TABLET ORAL at 10:14

## 2023-02-06 RX ADMIN — METFORMIN HYDROCHLORIDE 1000 MG: 1000 TABLET ORAL at 10:14

## 2023-02-06 RX ADMIN — ACETAMINOPHEN 650 MG: 325 TABLET ORAL at 20:54

## 2023-02-06 RX ADMIN — IPRATROPIUM BROMIDE 2 SPRAY: 21 SPRAY NASAL at 22:12

## 2023-02-06 RX ADMIN — GEMFIBROZIL 600 MG: 600 TABLET ORAL at 10:34

## 2023-02-06 RX ADMIN — AMLODIPINE BESYLATE 5 MG: 5 TABLET ORAL at 12:45

## 2023-02-06 RX ADMIN — Medication 400 MG: at 20:55

## 2023-02-06 RX ADMIN — HYDRALAZINE HYDROCHLORIDE 10 MG: 20 INJECTION INTRAMUSCULAR; INTRAVENOUS at 21:10

## 2023-02-06 RX ADMIN — RIVAROXABAN 15 MG: 15 TABLET, FILM COATED ORAL at 18:54

## 2023-02-06 RX ADMIN — LEVOTHYROXINE SODIUM 150 MCG: 150 TABLET ORAL at 10:14

## 2023-02-06 RX ADMIN — METFORMIN HYDROCHLORIDE 1000 MG: 1000 TABLET ORAL at 18:54

## 2023-02-06 RX ADMIN — POTASSIUM CHLORIDE 40 MEQ: 1500 TABLET, EXTENDED RELEASE ORAL at 10:14

## 2023-02-06 RX ADMIN — ALLOPURINOL 300 MG: 300 TABLET ORAL at 20:55

## 2023-02-06 RX ADMIN — Medication 400 MG: at 10:13

## 2023-02-06 ASSESSMENT — PAIN SCALES - GENERAL
PAINLEVEL_OUTOF10: 0
PAINLEVEL_OUTOF10: 3

## 2023-02-06 ASSESSMENT — PAIN DESCRIPTION - PAIN TYPE: TYPE: ACUTE PAIN

## 2023-02-06 ASSESSMENT — PAIN DESCRIPTION - ONSET: ONSET: SUDDEN

## 2023-02-06 ASSESSMENT — PAIN DESCRIPTION - ORIENTATION: ORIENTATION: MID

## 2023-02-06 ASSESSMENT — PAIN DESCRIPTION - LOCATION: LOCATION: HEAD

## 2023-02-06 ASSESSMENT — PAIN DESCRIPTION - FREQUENCY: FREQUENCY: INTERMITTENT

## 2023-02-06 ASSESSMENT — PAIN DESCRIPTION - DESCRIPTORS: DESCRIPTORS: ACHING

## 2023-02-06 ASSESSMENT — PAIN - FUNCTIONAL ASSESSMENT: PAIN_FUNCTIONAL_ASSESSMENT: ACTIVITIES ARE NOT PREVENTED

## 2023-02-06 NOTE — CARE COORDINATION
Case Management Assessment  Initial Evaluation    Date/Time of Evaluation: 2/6/2023 12:39 PM  Assessment Completed by: Rubia Lechuga RN    If patient is discharged prior to next notation, then this note serves as note for discharge by case management. Patient Name: France Jane                   YOB: 1945  Diagnosis: PAF (paroxysmal atrial fibrillation) ([facility] [address] [I48.0]  A-fib ([facility] [address] [I48.91]                   Date / Time: 2/6/2023  7:07 AM    Patient Admission Status: Inpatient   Readmission Risk (Low < 19, Mod (19-27), High > 27): Readmission Risk Score: 8.5    Current PCP: Kevin Nina MD  PCP verified by CM? Yes    Chart Reviewed: Yes      History Provided by: Patient, Medical Record  Patient Orientation: Alert and Oriented    Patient Cognition: Alert    Hospitalization in the last 30 days (Readmission):  No    If yes, Readmission Assessment in  Navigator will be completed. Advance Directives:      Code Status: Full Code   Patient's Primary Decision Maker is: Legal Next of Kin      Discharge Planning:    Patient lives with: Alone Type of Home: House  Primary Care Giver: Self  Patient Support Systems include: Family Members   Current Financial resources:    Current community resources:    Current services prior to admission: None            Current DME:              Type of Home Care services:  None    ADLS  Prior functional level: Independent in ADLs/IADLs  Current functional level: Independent in ADLs/IADLs    PT AM-PAC:   /24  OT AM-PAC:   /24    Family can provide assistance at DC: Yes  Would you like Case Management to discuss the discharge plan with any other family members/significant others, and if so, who?  Yes  Plans to Return to Present Housing: Yes  Other Identified Issues/Barriers to RETURNING to current housing: NONE  Potential Assistance needed at discharge: N/A            Potential DME:    Patient expects to discharge to: [address] [place] [address] for transportation at discharge: Financial    Payor: MEDICARE / Plan: MEDICARE PART A AND B / Product Type: *No Product type* /     Does insurance require precert for SNF: No    Potential assistance Purchasing Medications: No  Meds-to-Beds request: Yes      [address] [address]  Phone: [phone] Fax: [phone]      Notes:    Factors facilitating achievement of predicted outcomes: Family support, Cooperative, and Pleasant    Barriers to discharge: Medication managment    Additional Case Management Notes: Patient here for Tikosyn loading with EP. Patient from home and plans for return home at DC. No current CM needs at this time. The Plan for Transition of Care is related to the following treatment goals of PAF (paroxysmal atrial fibrillation) (HCC) [I48.0]  A-fib ([address] [E90.71]    IF APPLICABLE: The Patient and/or patient representative Nohemi Harris and his family were provided with a choice of provider and agrees with the discharge plan. Freedom of choice list with basic dialogue that supports the patient's individualized plan of care/goals and shares the quality data associated with the providers was provided to: Patient   Patient Representative Name:       The Patient and/or Patient Representative Agree with the Discharge Plan?  Yes    Adriel Silva RN  Case Management Department  Ph: [phone] Fax: [phone]

## 2023-02-06 NOTE — CONSULTS
Clinical Pharmacy Progress Note    ADMIT DATE: 2/6/2023     77 yo F with PMH of HTN, DM, gout, BRAD, hypothyroidism s/p thyroidectomy, CAD with recurrent Afib; admitted for Dofetilide loading. Pharmacy asked to assist with electrolyte management (goal K > 4, Mg > 2) by NAKIA Dorman. LABORATORY:  Recent Labs     02/06/23  0820   *   K 3.7   CL 99   CO2 20*   BUN 35*   CREATININE 1.3   GLUCOSE 230*   MG 1.60*       Estimated Creatinine Clearance: 45 mL/min (based on SCr of 1.3 mg/dL). ASSESSMENT/PLAN:  1)  Dofetilide Loading --   Electrolytes--  Pharmacy asked to assist with electrolyte replacement. Goal K > 4, Mg > 2. Electrolytes below goal --   K 3.7 -- Ordered KCl 40 mEq PO x1 as replacement  Mg 1.6 - Ordered Magnesium 4g IV x1 as replacement. Also on magnesium oxide 400mg PO BID. Pharmacy will continue to monitor and replace electrolytes as appropriate.     Please call with questions--  Ladarius Guo PharmD, BCPS  Wireless: [phone]   2/6/2023 10:07 AM

## 2023-02-06 NOTE — PLAN OF CARE
Problem: Discharge Planning  Goal: Discharge to home or other facility with appropriate resources  Outcome: Progressing  Flowsheets  Taken 2/6/2023 1104  Discharge to home or other facility with appropriate resources:   Identify barriers to discharge with patient and caregiver   Identify discharge learning needs (meds, wound care, etc)  Taken 2/6/2023 0744  Discharge to home or other facility with appropriate resources: Identify barriers to discharge with patient and caregiver

## 2023-02-06 NOTE — H&P
[address]   Electrophysiology   H&P  Rubina Russell CNP    Date: 2/6/2023      History Obtained From: Patient and medical record. Chief Complaint:pAF, dofetilide loading    Cardiac Hx: Anabel Wilson is a 76 y.o. 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 Ion Montoya, s/s pAF, s/s post conversion pauses while on large dose of fito agents, d/c'd BB, started dilt and low dose sotalol with low AF burden, >7 sec pause noted on outpt monitor, s/p dual-chamber Medtronic PPM on (2/15/2017, Dr. Bozena Rivers), recurrent AF, here for dofetilide loading. Interval Hx/HPI: Patient is being admitted for pAF and dofetilide loading. Patinet with long standing h/o pAF. Had previously been on sotalol with recurrent AF noted on his device. AF burden had been increasing. Patient is s/s at times. Patient being admitted for dofetilide loading. Sotalol stopped for 3 days. Denies CP, SOB, PND, orthopnea or LE edema. Home medications:   No current facility-administered medications on file prior to encounter.      Current Outpatient Medications on File Prior to Encounter   Medication Sig Dispense Refill    magnesium oxide (MAG-OX) 400 MG tablet Take 1 tablet by mouth 2 times daily 60 tablet 5    furosemide (LASIX) 20 MG tablet TAKE 3 TABLETS BY MOUTH DAILY 270 tablet 3    rivaroxaban (XARELTO) 15 MG TABS tablet Take 15 mg by mouth      Multiple Vitamins-Minerals (PRESERVISION AREDS PO) Take 2 capsules by mouth 2 times daily      ipratropium (ATROVENT) 0.03 % nasal spray 2 sprays by Each Nostril route every 12 hours      sotalol (BETAPACE) 80 MG tablet TAKE 1 TABLET BY MOUTH 2 TIMES DAILY (Patient not taking: Reported on 2/6/2023) 180 tablet 3    oxybutynin (DITROPAN) 5 MG/5ML syrup Take 5 mg by mouth as needed       tamsulosin (FLOMAX) 0.4 MG capsule Take 0.4 mg by mouth nightly      traZODone (DESYREL) 100 MG tablet Take 100 mg by mouth nightly      atorvastatin (LIPITOR) 40 MG tablet Take 40 mg by mouth nightly       aspirin 81 MG tablet Take 81 mg by mouth daily. levothyroxine (SYNTHROID) 150 MCG tablet Take 150 mcg by mouth daily. allopurinol (ZYLOPRIM) 300 MG tablet Take 300 mg by mouth nightly       gemfibrozil (LOPID) 600 MG tablet Take 600 mg by mouth 2 times daily (before meals). metformin (GLUCOPHAGE) 1000 MG tablet Take 1,000 mg by mouth 2 times daily (with meals). PRN Meds:acetaminophen     Past Medical History:   Diagnosis Date    Aortic stenosis 2015 Feb 2019 mean gradient 35mg Hg    Atrial fibrillation ([facility] 07/2012    Saw cardiologist for palps. Event recorder showed AF about 5% of the time. Warfarin started. Became persistent in March 2019 when admitted with pneumonia. CAD (coronary artery disease), native coronary artery Dec 18, 2007    Presented with palpitation to ER. Slight inc. in S. troponin. Cor. angio showed 90% L. circ. Stent placed. (Dr Reji Hooker. Diabetes mellitus ([facility] 1992    HbA1C in Jennifer '15 was 6.9 on metformin and glipizide. Diastolic congestive heart failure ([facility] 08/2006    presented with wheeze, [id number]. cardiac cath to r/o ischemia. Diffuse CAD no critical stenoses. LVEF 55% LVEDP 31. Essential hypertension since 1980    Difficult to control with need for minoxidil in addition to ARB,CCB,BB and diuretic. Gout     Podagra intermittently over the years. Uric acid 9.4 in 2011. Allopurinol started (with colchicine for 3 months)    Hypothyroid March 2011    Total thyroidectomy for indeterminate nodule. Proved benign. Left carotid bruit Oct 2014    Discovered at routine exam. Carotid Doppler showed bilateral stenosis <50%    Obstructive sleep apnea 1997    as of 2019 still using CPAP    Peripheral neuropathy     2/2 DM. Pneumonia 40/1442    complicated by hypotension and overdiuresis leading to REYNALDO. S.Cr 1.7 on 3/9/19.  Improved to 1.3 on 3/11/19    Sick sinus syndrome (HCC) 02/16/2017    PIP placed because of 7 sec pauses on sotalol. Past Surgical History:   Procedure Laterality Date    CHOLECYSTECTOMY, OPEN  1993    CORONARY ANGIOPLASTY  12/17/07    has stent per pt    DIAGNOSTIC CARDIAC CATH LAB PROCEDURE  12/17/07    PACEMAKER INSERTION  02/2017    Sick sinus sundrome with 7 sec pauses on sotalol. THYROIDECTOMY  2011    Benign    TOTAL KNEE ARTHROPLASTY Right 3/12/14       Allergies   Allergen Reactions    Erythromycin Hives and Swelling     Swelling of hands and feet, all-over hives. Spironolactone Other (See Comments)     hyperkalemia       Social History:  Reviewed. reports that he has never smoked. He has never used smokeless tobacco. He reports that he does not drink alcohol and does not use drugs. Family History:  Reviewed. family history includes Heart Attack in his maternal grandfather; Heart Defect in his maternal grandfather and mother; Heart Disease in his father; Heart Surgery in his father; Pacemaker in his mother. Review of System:    Constitutional: No fevers, chills. Eyes: No visual changes or diplopia. No scleral icterus. ENT: No Headaches. No mouth sores or sore throat. Cardiovascular: No for chest pain, No for dyspnea on exertion, Yes for palpitations or No for loss of consciousness. No cough, hemoptysis, No for pleuritic pain, or phlebitis. Respiratory: No for cough or wheezing. No hematemesis. Gastrointestinal: No abdominal pain, blood in stools. Genitourinary: No dysuria, or hematuria. Musculoskeletal: No gait disturbance,    Integumentary: No rash or pruritis. Neurological: No headache, change in muscle strength, numbness or tingling. Psychiatric: No anxiety, or depression. Endocrine: No temperature intolerance. No excessive thirst, fluid intake, or urination. Hem/Lymph: No abnormal bruising or bleeding, blood clots or swollen lymph nodes.   Allergic/Immunologic: No nasal congestion or hives.    Physical Examination:  There were no vitals filed for this visit. No intake/output data recorded. Wt Readings from Last 3 Encounters:   02/06/23 172 lb 6.4 oz (78.2 kg)   12/29/22 171 lb (77.6 kg)   10/12/22 172 lb (78 kg)     No data recorded  No intake or output data in the 24 hours ending 02/06/23 0819    Constitutional: Oriented. No distress. Head: Normocephalic and atraumatic. Mouth/Throat: Oropharynx is clear and moist.   Eyes: Conjunctivae clear without jaunduice. PERRL. Neck: Neck supple. No rigidity. No JVD present. Cardiovascular: Normal rate, regular rhythm, S1&S2. Pulmonary/Chest: Bilateral respiratory sounds. No wheezes, No rhonchi. Abdominal: Soft. Bowel sounds present. No distension, No tenderness. Musculoskeletal: No tenderness. No edema    Lymphadenopathy: Has no cervical adenopathy. Neurological: Alert and oriented. Cranial nerve appears intact, No Gross deficit   Skin: Skin is warm and dry. No rash noted. Psychiatric: Has a normal mood, affect and behavior     Labs:  Reviewed. No results for input(s): NA, K, CL, CO2, PHOS, BUN, CREATININE, CA in the last 72 hours. Invalid input(s):  TSH  No results for input(s): WBC, HGB, HCT, MCV, PLT in the last 72 hours.   Lab Results   Component Value Date/Time    CKTOTAL 97 02/04/2013 10:19 AM    TROPONINI <0.01 10/11/2021 12:08 AM     Lab Results   Component Value Date/Time    .0 11/30/2015 03:04 PM     Lab Results   Component Value Date/Time    PROTIME 15.7 02/01/2023 01:57 PM    PROTIME 57.2 10/19/2021 11:01 AM    PROTIME 22.9 12/12/2019 09:13 AM    INR 1.26 02/01/2023 01:57 PM    INR 4.74 10/19/2021 11:01 AM    INR 1.96 12/12/2019 09:13 AM     Lab Results   Component Value Date/Time    CHOL 126 04/11/2022 10:52 AM    HDL 60 04/11/2022 10:52 AM    HDL 51 09/13/2011 10:53 AM    TRIG 58 04/11/2022 10:52 AM       Telemetry: Personally reviewed: NSR, long [place]    Radiography: Personally reviewed: n/a    ECG: Personally reviewed: NSR, long [place]    ECHO:  2.28.22   Summary   Left ventricular cavity size is normal.   There is mild concentric left ventricular hypertrophy. Left ventricular function is normal with ejection fraction estimated at   60-65%. No regional wall motion abnormalities are noted. Diastolic filling parameters suggest grade II diastolic dysfunction. Global L. Strain = -17.2%. Mitral annular calcification is present. Mild mitral regurgitation is present. Bi-atrial enlargement. Moderate aortic stenosis. Mild to moderate aortic regurgitation. Mild tricuspid regurgitation. Estimated pulmonary artery systolic pressure is at 43 mmHg assuming a right   atrial pressure of 3 mmHg. Stress Test: 7.8.2020  Summary    There is normal isotope uptake at stress and rest. There is no evidence of    myocardial ischemia or scar. Normal LV size and systolic function. Left ventricular ejection fraction of 73 %. There are no regional wall motion abnormalities.     Overall findings represent a low risk scan     Cardiac Angiography: 2007    Problem List:   Patient Active Problem List    Diagnosis Date Noted    PAF (paroxysmal atrial fibrillation) ([facility] 02/06/2023    Cardiac pacemaker in situ 02/15/2017    Bradycardia     Dizziness and giddiness     Lightheaded     Sick sinus syndrome (HCC)     Paroxysmal atrial fibrillation (HCC)     Syncope 11/04/2015    Hyperlipidemia     Old myocardial infarction     Essential hypertension     Coronary artery disease involving native coronary artery of native heart without angina pectoris     Palpitations     Shortness of breath     History of coronary angioplasty     Chronic diastolic (congestive) heart failure ([address] 03/28/2019    Nonrheumatic aortic valve stenosis 02/71/4469    Acute diastolic (congestive) heart failure ([facility] 03/07/2019    Community acquired pneumonia 03/07/2019    Pneumonia 03/01/2019        Assessment:     pAF  SSS  S/p PPM  CAD  HTN  On 4 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 Arvin Gabriel, s/s pAF, s/s post conversion pauses while on large dose of fito agents, d/c'd BB, started dilt and low dose sotalol with low AF burden, >7 sec pause noted on outpt monitor, s/p dual-chamber Medtronic PPM on (2/15/2017, Dr. Shefali Underwood), recurrent AF, here for dofetilide loading. [id number] 5. TSH 1.09 (4/11/2022). AF/SSS  - In NSR   - Off sotalol x 3 days  - CrCl 50.84  - Here for dofetilide loading  - Will start dofetilide 250 mcg BID - reviewed EKG with Dr. Dylan Keith on tele to monitor for torsades  - EKG 2 hours after each oral dose  - Keep K+ > 4.0 and Mg > 2.0 - replacement ordered  - Reviewed labs  - On Xarelto 15 mg QD - no s/s bleeding - continue  - S/p dual chamber Medtronic PPM (2/15/2017, Dr. Shefali Underwood)  - ECG ordered and results personally reviewed      Chronic diastolic HF  - Appears euvolemic  - EF 60-65%  - On Lasix 20 mg daily     HTN  - BP elevated  - Will follow and add meds if needed    Mod to sev AS  - Mod per echo 2/28/22  - Will recheck echo    CAD  - No s/s  - On ASA/statin  - Follows with Dr. Arvin Gabriel     EF of 60-03%  No systolic HF  ASA and Statin for CAD  Anticoagulation for AF   No tobacco use     All questions and concerns were addressed to the patient/family. Alternatives to my treatment were discussed. The note was completed using EMR. Every effort was made to ensure accuracy; however, inadvertent computerized transcription errors may be present.       Claudia Oliver [address]

## 2023-02-06 NOTE — PROGRESS NOTES
Administered pts first dose of Tikosyn, order for EKG placed per protocol for 1445 and EKG tech notified  Electronically signed by Vicki Duque RN on 2/6/2023 at 12:49 PM

## 2023-02-06 NOTE — DISCHARGE INSTRUCTIONS
Tikosyn/dofetilide            Medication Information for Your Tikosyn®  (Dofetilide) Admission    Why am I going to start dofetilide? You have atrial fibrillation or atrial flutter that may be causing you  problems. Tikosyn, also called dofetilide (generic name), is one of  the medications used to try to keep you out of atrial fibrillation. Why do I need to come in to the hospital?    - Although dofetilide is safe in most people, there are a small  number of people who will have an exaggerated electrical  effect from this medication. You will be monitored with an EKG  (electrocardiogram) 2 hours after each oral dose of dofetilide   to make sure there are no EKG changes. - If your doctor suspects that you are having this exaggerated  effect or your EKG shows changes (a prolonged QTc), your dose   will either be reduced or the medication stopped altogether.    - If you have not converted on your own after 3-5 doses of dofetilide,   you may undergo a cardioversion to place you back in a regular rhythm   (sinus rhythm) prior to discharge. - After five total doses monitored in the hospital, it is safe for you  to leave the hospital and continue to take the medicine at  home. How do I take dofetilide? - Dofetilide is a pill that you take every 12 hours. - You must be very careful to take it as close to every 12 hours  as possible. You may have breakthrough of your arrhythmia   (atrial fibrillation or atrial flutter) if doses are taken late and not 12 hours apart. What happens if Im late with a dose? - If its within one to two hours of your scheduled time, take it right away  and stay on your schedule of every 12 hours    - If you are late by more than one or two hours, skip the dose and take  the next one as scheduled. What happens if I skip more than one dose?    - Do not miss more than one dose.     - If you miss more than one dose, you must stop taking the  medication and let your doctor know. You will most likely be  readmitted to the hospital to restart the medication, just as you    did the first time. - Most people set an alarm on their watches, cell phones or home alarm clocks to ring  every day at the scheduled times to help them remember. - It may help for you to have a pill carrier on your keychain and keep one or two  capsules with you at all times. - Do not take an extra dose of dofetilide ever. What are the side effects of dofetilide? - This medicine is very well tolerated. There are very minimal side effects. Talk to your  doctor if you think you are having a side effect from this medicine, as it is rare and  unusual.    - Call your doctor right away or seek emergency help if you feel faint, get dizzy or  lightheaded, or have a fast or abnormal heartbeat sensation different from your atrial  fibrillation. Is there anything I should look out for?    - Let your doctor know if you have any of the following:    Severe diarrhea  Heavy, profuse sweating  Vomiting  Dramatic change in urination (urinating too much or too little). - Call the office if you have vomiting or diarrhea as this can cause dehydration and   potentially be an issue with the medication.       - You MUST get an EKG and blood tests at least every six months, or, in some cases,  every three months. Make sure to contact your doctors office if it has been longer  than six months since your last EKG or blood test. We want your potassium level above   4.0 and your magnesium level above 2.0. You may be prescribed potassium or magnesium   supplements to keep these levels. Are there any medicines I should avoid? - Look over the following list of medications carefully. Notify your doctor if you are  taking one of these medicines.  You should stop these medicines two days before  your scheduled hospitalization:    - Cimetidine (Tagamet®, Tagamet HB®)  - Verapamil (Calan®, Calan SR®, Covera-HS®, Isoptin®, Isoptin SR®,    Verelan®, Verelan PM®, Tarka®)  - Ketoconazole (Nizoral®, Xolegel®, [place])  - Trimethoprim alone (Proloprim®, Trimpex®) or the combination of     trimethoprim and sulfamethoxazole (Bactrim®, Septra®, Sulfatrim®)  - Prochlorperazine (Compazine®, Compro®)  - Megestrol (Megace®)  - Hydrochlorothiazide alone or in combination with other medicines (Esidrix®,    Ezide®, Hydrodiuril®, Hydro-Par®, Microzide®, Oretic®)    - If you receive any new prescriptions, please make sure the prescribing doctor is  aware you are on dofetilide (Tikosyn) to check for drug-drug interactions. You may   check with our office at [phone] to review any new prescriptions prior to starting.     - Please check any new medications including over the counter medications via the website www. enEvolvs. org. Can I stop taking blood thinners? - In general, if you were on a blood thinner before starting dofetilide, you should  remain on one at all times, even if you believe you are no longer in atrial fibrillation. What will happen during my hospitalization?    - You will receive an EKG and blood test before your arrival in the hospital.    - Once you arrive, your hospital team will start you on dofetilide. (It is a capsule.)    - You will get an EKG two hours after each dose, for a total of five doses. - On the third or fifth day (physician preference), you may have a cardioversion (electrical shock) to try to get you   out of atrial fibrillation. (In some cases, you will have a cardioversion before starting  the medication. )    - You will most likely go home on the third day. Be sure to follow up with your doctor as  scheduled, no later than three months after starting the medicine. Plan to have  someone drive you home. Who should I call for more questions?     - Call our office and ask to speak to the heart rhythm team, including Dr. Cezar Gould [facility], [place] [facility], [address] [facility] at [phone].    - Call the [place] [address] hotline at 4-312-TIKOSYN ([phone]). Where can I get this medication? - Unfortunately, only a limited number of pharmacies stock this medication. Call your  regular pharmacy and ask if they are able to order the medication.    - In most cases, you will leave the hospital with a 1 week free supply of dofetilide in hand. This will allow your pharmacy time to fill your prescription. Do not wait to drop off your   prescription to your outpatient pharmacy to allow them time to order the medication in. In addition, a prescription will be sent to your home pharmacy. For more about Sushil Melendez, go to www.Enbridge or call 1-771-IEMXRKY (2-256.925.7047). Extra Heart Failure sites:   https://ImpressPages/ --- this is American Heart Association interactive Healthier Living with Heart Failure guidebook. Please copy and paste link into search bar. Use your mouse to scroll through the pages. Lots and lots of info / tips    [facility] [place] deepti --- free smart phone deepti available for [employer] and [employer]. Use your phone to track sodium / fluid intake, symptoms, weight, etc.    [employer] - website-- [employer] is a dialysis [employer]. All dialysis patients follow a renal diet which IS low sodium!! This website offers free seasonal cookbooks. Each quarter, they will release 25-30 new recipes with a breakdown of calories, sodium, glucose, etc    [email or web address].EVRST/recipes -- more free recipes        Tikosyn/dofetilide            Medication Information for Your Tikosyn®  (Dofetilide) Admission    Why am I going to start dofetilide? You have atrial fibrillation or atrial flutter that may be causing you  problems. Tikosyn, also called dofetilide (generic name), is one of  the medications used to try to keep you out of atrial fibrillation.     Why do I need to come in to the hospital?    - Although dofetilide is safe in most people, there are a small  number of people who will have an exaggerated electrical  effect from this medication. You will be monitored with an EKG  (electrocardiogram) 2 hours after each oral dose of dofetilide   to make sure there are no EKG changes. - If your doctor suspects that you are having this exaggerated  effect or your EKG shows changes (a prolonged QTc), your dose   will either be reduced or the medication stopped altogether.    - If you have not converted on your own after 3-5 doses of dofetilide,   you may undergo a cardioversion to place you back in a regular rhythm   (sinus rhythm) prior to discharge. - After five total doses monitored in the hospital, it is safe for you  to leave the hospital and continue to take the medicine at  home. How do I take dofetilide? - Dofetilide is a pill that you take every 12 hours. - You must be very careful to take it as close to every 12 hours  as possible. You may have breakthrough of your arrhythmia   (atrial fibrillation or atrial flutter) if doses are taken late and not 12 hours apart. What happens if Im late with a dose? - If its within one to two hours of your scheduled time, take it right away  and stay on your schedule of every 12 hours    - If you are late by more than one or two hours, skip the dose and take  the next one as scheduled. What happens if I skip more than one dose?    - Do not miss more than one dose. - If you miss more than one dose, you must stop taking the  medication and let your doctor know. You will most likely be  readmitted to the hospital to restart the medication, just as you    did the first time. - Most people set an alarm on their watches, cell phones or home alarm clocks to ring  every day at the scheduled times to help them remember. - It may help for you to have a pill carrier on your keychain and keep one or two  capsules with you at all times. - Do not take an extra dose of dofetilide ever.     What are the side effects of dofetilide? - This medicine is very well tolerated. There are very minimal side effects. Talk to your  doctor if you think you are having a side effect from this medicine, as it is rare and  unusual.    - Call your doctor right away or seek emergency help if you feel faint, get dizzy or  lightheaded, or have a fast or abnormal heartbeat sensation different from your atrial  fibrillation. Is there anything I should look out for?    - Let your doctor know if you have any of the following:    Severe diarrhea  Heavy, profuse sweating  Vomiting  Dramatic change in urination (urinating too much or too little). - Call the office if you have vomiting or diarrhea as this can cause dehydration and   potentially be an issue with the medication.       - You MUST get an EKG and blood tests at least every six months, or, in some cases,  every three months. Make sure to contact your doctors office if it has been longer  than six months since your last EKG or blood test. We want your potassium level above   4.0 and your magnesium level above 2.0. You may be prescribed potassium or magnesium   supplements to keep these levels. Are there any medicines I should avoid? - Look over the following list of medications carefully. Notify your doctor if you are  taking one of these medicines.  You should stop these medicines two days before  your scheduled hospitalization:    - Cimetidine (Tagamet®, Tagamet HB®)  - Verapamil (Calan®, Calan SR®, Covera-HS®, Isoptin®, Isoptin SR®,    Verelan®, Verelan PM®, Tarka®)  - Ketoconazole (Nizoral®, Xolegel®, Extina®)  - Trimethoprim alone (Proloprim®, Trimpex®) or the combination of     trimethoprim and sulfamethoxazole (Bactrim®, Septra®, Sulfatrim®)  - Prochlorperazine (Compazine®, Compro®)  - Megestrol (Megace®)  - Hydrochlorothiazide alone or in combination with other medicines (Esidrix®,    Ezide®, Hydrodiuril®, Hydro-Par®, Microzide®, Oretic®)    - If you receive any new prescriptions, please make sure the prescribing doctor is  aware you are on dofetilide (Tikosyn) to check for drug-drug interactions. You may   check with our office at [phone] to review any new prescriptions prior to starting.     - Please check any new medications including over the counter medications via the website www. Woopies. org. Can I stop taking blood thinners? - In general, if you were on a blood thinner before starting dofetilide, you should  remain on one at all times, even if you believe you are no longer in atrial fibrillation. What will happen during my hospitalization?    - You will receive an EKG and blood test before your arrival in the hospital.    - Once you arrive, your hospital team will start you on dofetilide. (It is a capsule.)    - You will get an EKG two hours after each dose, for a total of five doses. - On the third or fifth day (physician preference), you may have a cardioversion (electrical shock) to try to get you   out of atrial fibrillation. (In some cases, you will have a cardioversion before starting  the medication. )    - You will most likely go home on the third day. Be sure to follow up with your doctor as  scheduled, no later than three months after starting the medicine. Plan to have  someone drive you home. Who should I call for more questions? - Call our office and ask to speak to the heart rhythm team, including Dr. Becki Miguel CNP, Casandra Cruz, CNP at [phone].    - Call the [place] [address] hotline at [facility] ([phone]). Where can I get this medication? - Unfortunately, only a limited number of pharmacies stock this medication. Call your  regular pharmacy and ask if they are able to order the medication.    - In most cases, you will leave the hospital with a 1 week free supply of dofetilide in hand. This will allow your pharmacy time to fill your prescription.  Do not wait to drop off your   prescription to your outpatient pharmacy to allow them time to order the medication in. In addition, a prescription will be sent to your home pharmacy. For more about Ashley Starkey, go to www.Iencuentra or call 3-557-GPOELCT ([phone]). Increase magnesium intake. Magnesium Rich Foods    1. Dark chocolate  2. Legumes  3. Nuts  4. Seeds  5. Avocados  6. Tofu  7. Whole grains  8. Fatty fish including salmon, mackerel and halibut  9. Bananas  10.   Leafy greens

## 2023-02-07 LAB
ANION GAP SERPL CALCULATED.3IONS-SCNC: 11 MMOL/L (ref 3–16)
BUN BLDV-MCNC: 29 MG/DL (ref 7–20)
CALCIUM SERPL-MCNC: 9.4 MG/DL (ref 8.3–10.6)
CHLORIDE BLD-SCNC: 100 MMOL/L (ref 99–110)
CO2: 25 MMOL/L (ref 21–32)
CREAT SERPL-MCNC: 1.1 MG/DL (ref 0.8–1.3)
EKG ATRIAL RATE: 166 BPM
EKG ATRIAL RATE: 66 BPM
EKG DIAGNOSIS: NORMAL
EKG DIAGNOSIS: NORMAL
EKG P AXIS: -42 DEGREES
EKG P-R INTERVAL: 226 MS
EKG Q-T INTERVAL: 344 MS
EKG Q-T INTERVAL: 474 MS
EKG QRS DURATION: 82 MS
EKG QRS DURATION: 82 MS
EKG QTC CALCULATION (BAZETT): 496 MS
EKG QTC CALCULATION (BAZETT): 496 MS
EKG R AXIS: 54 DEGREES
EKG R AXIS: 65 DEGREES
EKG T AXIS: 35 DEGREES
EKG T AXIS: 40 DEGREES
EKG VENTRICULAR RATE: 125 BPM
EKG VENTRICULAR RATE: 66 BPM
ESTIMATED AVERAGE GLUCOSE: 139.9 MG/DL
GFR SERPL CREATININE-BSD FRML MDRD: >60 ML/MIN/{1.73_M2}
GLUCOSE BLD-MCNC: 126 MG/DL (ref 70–99)
GLUCOSE BLD-MCNC: 211 MG/DL (ref 70–99)
GLUCOSE BLD-MCNC: 234 MG/DL (ref 70–99)
GLUCOSE BLD-MCNC: 252 MG/DL (ref 70–99)
GLUCOSE BLD-MCNC: 83 MG/DL (ref 70–99)
GLUCOSE BLD-MCNC: 94 MG/DL (ref 70–99)
HBA1C MFR BLD: 6.5 %
INR BLD: 1.65 (ref 0.87–1.14)
LV EF: 63 %
LVEF MODALITY: NORMAL
MAGNESIUM: 2.2 MG/DL (ref 1.8–2.4)
PERFORMED ON: ABNORMAL
PERFORMED ON: NORMAL
POTASSIUM SERPL-SCNC: 4 MMOL/L (ref 3.5–5.1)
PROTHROMBIN TIME: 19.5 SEC (ref 11.7–14.5)
SODIUM BLD-SCNC: 136 MMOL/L (ref 136–145)

## 2023-02-07 PROCEDURE — 85610 PROTHROMBIN TIME: CPT

## 2023-02-07 PROCEDURE — 93005 ELECTROCARDIOGRAM TRACING: CPT | Performed by: INTERNAL MEDICINE

## 2023-02-07 PROCEDURE — 93010 ELECTROCARDIOGRAM REPORT: CPT | Performed by: INTERNAL MEDICINE

## 2023-02-07 PROCEDURE — 93306 TTE W/DOPPLER COMPLETE: CPT

## 2023-02-07 PROCEDURE — 99232 SBSQ HOSP IP/OBS MODERATE 35: CPT | Performed by: NURSE PRACTITIONER

## 2023-02-07 PROCEDURE — 80048 BASIC METABOLIC PNL TOTAL CA: CPT

## 2023-02-07 PROCEDURE — 2060000000 HC ICU INTERMEDIATE R&B

## 2023-02-07 PROCEDURE — 93005 ELECTROCARDIOGRAM TRACING: CPT | Performed by: NURSE PRACTITIONER

## 2023-02-07 PROCEDURE — 6370000000 HC RX 637 (ALT 250 FOR IP): Performed by: NURSE PRACTITIONER

## 2023-02-07 PROCEDURE — 36415 COLL VENOUS BLD VENIPUNCTURE: CPT

## 2023-02-07 PROCEDURE — 83735 ASSAY OF MAGNESIUM: CPT

## 2023-02-07 RX ORDER — DOFETILIDE 0.12 MG/1
125 CAPSULE ORAL EVERY 12 HOURS SCHEDULED
Status: DISCONTINUED | OUTPATIENT
Start: 2023-02-07 | End: 2023-02-09 | Stop reason: HOSPADM

## 2023-02-07 RX ADMIN — FUROSEMIDE 20 MG: 20 TABLET ORAL at 08:38

## 2023-02-07 RX ADMIN — TRAZODONE HYDROCHLORIDE 100 MG: 100 TABLET ORAL at 23:51

## 2023-02-07 RX ADMIN — METOPROLOL TARTRATE 25 MG: 25 TABLET, FILM COATED ORAL at 09:40

## 2023-02-07 RX ADMIN — IPRATROPIUM BROMIDE 2 SPRAY: 21 SPRAY NASAL at 08:38

## 2023-02-07 RX ADMIN — RIVAROXABAN 20 MG: 20 TABLET, FILM COATED ORAL at 17:16

## 2023-02-07 RX ADMIN — Medication 400 MG: at 21:15

## 2023-02-07 RX ADMIN — Medication 400 MG: at 08:38

## 2023-02-07 RX ADMIN — ATORVASTATIN CALCIUM 40 MG: 40 TABLET, FILM COATED ORAL at 21:14

## 2023-02-07 RX ADMIN — METOPROLOL TARTRATE 25 MG: 25 TABLET, FILM COATED ORAL at 21:13

## 2023-02-07 RX ADMIN — ALLOPURINOL 300 MG: 300 TABLET ORAL at 21:14

## 2023-02-07 RX ADMIN — INSULIN LISPRO 1 UNITS: 100 INJECTION, SOLUTION INTRAVENOUS; SUBCUTANEOUS at 18:20

## 2023-02-07 RX ADMIN — METFORMIN HYDROCHLORIDE 1000 MG: 1000 TABLET ORAL at 17:16

## 2023-02-07 RX ADMIN — LEVOTHYROXINE SODIUM 150 MCG: 150 TABLET ORAL at 08:37

## 2023-02-07 RX ADMIN — GEMFIBROZIL 600 MG: 600 TABLET ORAL at 17:16

## 2023-02-07 RX ADMIN — DOFETILIDE 250 MCG: 0.12 CAPSULE ORAL at 08:38

## 2023-02-07 RX ADMIN — DOFETILIDE 125 MCG: 0.12 CAPSULE ORAL at 21:13

## 2023-02-07 RX ADMIN — GEMFIBROZIL 600 MG: 600 TABLET ORAL at 07:04

## 2023-02-07 RX ADMIN — ASPIRIN 81 MG: 81 TABLET, COATED ORAL at 08:38

## 2023-02-07 RX ADMIN — IPRATROPIUM BROMIDE 2 SPRAY: 21 SPRAY NASAL at 21:15

## 2023-02-07 RX ADMIN — TAMSULOSIN HYDROCHLORIDE 0.4 MG: 0.4 CAPSULE ORAL at 21:14

## 2023-02-07 RX ADMIN — METFORMIN HYDROCHLORIDE 1000 MG: 1000 TABLET ORAL at 08:37

## 2023-02-07 NOTE — PROGRESS NOTES
Electrophysiology - PROGRESS NOTE    Admit Date: 2/6/2023     Chief Complaint: pAF     Interval History:   Patient seen and examined and notes reviewed. Patient is being followed for pAF. Patient has longstanding history of paroxysmal atrial fibrillation. He had previously been on sotalol with recurrent AF noted on his pacemaker. His overall AF burden has been increasing. Patient is symptomatic at times. We discontinued his sotalol x3 days and he has been admitted for dofetilide loading. He was initiated on 250 mcg once a day. His second dose was this morning. Last evening dose missed due to initial dose being given so late. Patient had gone into atrial fibrillation early this morning. He did receive his a.m. dose of dofetilide and converted to NSR. He did have what appeared to be NSVT on telemetry however after reviewing telemetry strips with Dr. 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 He did complain of tremors. We stopped his amlodipine this morning we will try him on metoprolol 25 mg twice a day. His creatinine clearance is better today at 58 and this was reviewed with the pharmacist who recommended he go up to 20 mg daily on the Xarelto.     In: 240 [P.O.:240]  Out: 1100    Wt Readings from Last 2 Encounters:   02/07/23 168 lb 10.4 oz (76.5 kg)   12/29/22 171 lb (77.6 kg)       Data:   Scheduled Meds:   Scheduled Meds:   allopurinol  300 mg Oral Nightly    aspirin  81 mg Oral Daily    atorvastatin  40 mg Oral Nightly    furosemide  20 mg Oral Daily    gemfibrozil  600 mg Oral BID AC    ipratropium  2 spray Each Nostril Q12H    levothyroxine  150 mcg Oral Daily    magnesium oxide  400 mg Oral BID    metFORMIN  1,000 mg Oral BID WC    rivaroxaban  15 mg Oral Daily    tamsulosin  0.4 mg Oral Nightly    traZODone  100 mg Oral Nightly    insulin lispro  0-4 Units SubCUTAneous TID WC    insulin lispro  0-4 Units SubCUTAneous Nightly amLODIPine  5 mg Oral Daily    dofetilide  250 mcg Oral 2 times per day     Continuous Infusions:   dextrose       PRN Meds:.acetaminophen, glucose, dextrose bolus **OR** dextrose bolus, glucagon (rDNA), dextrose, perflutren lipid microspheres, hydrALAZINE  Continuous Infusions:   dextrose         Intake/Output Summary (Last 24 hours) at 2/7/2023 0756  Last data filed at 2/6/2023 2339  Gross per 24 hour   Intake 240 ml   Output 1100 ml   Net -860 ml       CBC:   Lab Results   Component Value Date/Time    WBC 5.8 02/01/2023 01:57 PM    HGB 12.8 02/01/2023 01:57 PM     02/01/2023 01:57 PM     BMP:  Lab Results   Component Value Date/Time     02/07/2023 05:12 AM    K 4.0 02/07/2023 05:12 AM    K 5.1 10/10/2021 10:35 PM     02/07/2023 05:12 AM    CO2 25 02/07/2023 05:12 AM    BUN 29 02/07/2023 05:12 AM    CREATININE 1.1 02/07/2023 05:12 AM    GLUCOSE 94 02/07/2023 05:12 AM     INR:   Lab Results   Component Value Date/Time    INR 1.65 02/07/2023 05:12 AM    INR 1.58 02/06/2023 08:20 AM    INR 1.26 02/01/2023 01:57 PM        CARDIAC LABS  ENZYMES:No results for input(s): CKMB, CKMBINDEX, TROPONINI in the last 72 hours.     Invalid input(s): CKTOTAL;3  FASTING LIPID PANEL:  Lab Results   Component Value Date/Time    HDL 60 04/11/2022 10:52 AM    HDL 51 09/13/2011 10:53 AM    LDLCALC 54 04/11/2022 10:52 AM    TRIG 58 04/11/2022 10:52 AM    TSH 4.00 01/29/2020 10:11 AM     LIVER PROFILE:  Lab Results   Component Value Date/Time    AST 21 02/01/2023 01:57 PM    AST 34 03/03/2021 10:54 AM    ALT 10 02/01/2023 01:57 PM    ALT 21 03/03/2021 10:54 AM     -----------------------------------------------------------------  Telemetry: Personally reviewed AF/RVR    Objective:   Vitals: /73   Pulse 87   Temp 97.4 °F (36.3 °C) (Oral)   Resp 18   Ht 5' 8\" (1.727 m)   Wt 168 lb 10.4 oz (76.5 kg)   SpO2 98%   BMI 25.64 kg/m²   General appearance: alert, appears stated age and cooperative, No acute distress Eyes: Conjunctiva and pupils normal and reactive  Skin: Skin color, texture, turgor normal. No rashes or ecchymosis. Neck: no JVD, supple, symmetrical, trachea midline   Lungs: , no accessory muscle use, no respiratory distress  Heart: Tachycardic, irregular  Abdomen: soft, non-tender; bowel sounds normal  Extremities: No edema, DP +  Psychiatric: normal insight and affect    Patient Active Problem List:     Hyperlipidemia     Old myocardial infarction     Essential hypertension     Coronary artery disease involving native coronary artery of native heart without angina pectoris     Palpitations     Shortness of breath     History of coronary angioplasty     Syncope     Lightheaded     Sick sinus syndrome (HCC)     Paroxysmal atrial fibrillation ([facility])     Dizziness and giddiness     Bradycardia     Cardiac pacemaker in situ     Acute diastolic (congestive) heart failure ([facility])     Community acquired pneumonia     Pneumonia     Chronic diastolic (congestive) heart failure ([facility])     Nonrheumatic aortic valve stenosis     PAF (paroxysmal atrial fibrillation) ([facility])     A-fib ([facility])        Assessment & Plan:      pAF  SSS  S/p PPM  CAD  HTN  On OAC  Dofetilide laoding        Cardiac Hx: Juanis Givens is a 76 y.o. 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 Aracely Montague, s/s pAF, s/s post conversion pauses while on large dose of fito agents, d/c'd BB, started dilt and low dose sotalol with low AF burden, >7 sec pause noted on outpt monitor, s/p dual-chamber Medtronic PPM on (2/15/2017, Dr. Jeniffer Bettencourt), recurrent AF, here for dofetilide loading. [id number] 5. TSH 1.09 (4/11/2022).       AF/SSS  - In AF - HR not controlled   - Off sotalol x 3 days  - CrCl 58.69  - Here for dofetilide loading  - On dofetilide 250 mcg BID - reviewed EKG with Dr. Brittany Elizalde on tele to monitor for torsades  - EKG 2 hours after each oral dose  - Keep K+ > 4.0 and Mg > 2.0 - replacement ordered  - Reviewed labs  - On Xarelto 15 mg QD - no s/s bleeding - continue -will increase to 20 mg daily based on current creatinine clearance  - S/p dual chamber Medtronic PPM (2/15/2017, Dr. Bozena Rivers)  - ECG ordered and results personally reviewed      Chronic diastolic HF  - Appears euvolemic  - EF 60-65%  - On Lasix 20 mg daily     HTN  - BP elevated  - Will follow and add meds if needed     Mod to sev AS  - Moderate AS per echo    CAD  - No s/s  - On ASA/statin  - Follows with Dr. Ion Montoya     EF of 54-55%  No systolic HF  ASA and Statin for CAD  Anticoagulation for AF   No tobacco use     All questions and concerns were addressed to the patient/family. Alternatives to my treatment were discussed. The note was completed using EMR. Every effort was made to ensure accuracy; however, inadvertent computerized transcription errors may be present.      Rubina Russell [address]

## 2023-02-07 NOTE — PROGRESS NOTES
4 Eyes Admission Assessment     I agree as the admission nurse that 2 RN's have performed a thorough Head to Toe Skin Assessment on the patient. ALL assessment sites listed below have been assessed on admission. Areas assessed by both nurses:   [x]   Head, Face, and Ears   [x]   Shoulders, Back, and Chest  [x]   Arms, Elbows, and Hands   [x]   Coccyx, Sacrum, and Ischium  [x]   Legs, Feet, and Heels        Does the Patient have Skin Breakdown?   No         Edmund Prevention initiated:  No   Wound Care Orders initiated:  No      [facility] nurse consulted for Pressure Injury (Stage 3,4, Unstageable, DTI, NWPT, and Complex wounds) or Edmund score 18 or lower:  No      Nurse 1 eSignature: Electronically signed by Indio Loza RN on 2/6/23 at 7:51 PM EST    **SHARE this note so that the co-signing nurse is able to place an eSignature**    Nurse 2 eSignature: Electronically signed by Tash Paz RN on 2/7/23 at 7:37 AM EST

## 2023-02-07 NOTE — PROGRESS NOTES
Clinical Pharmacy Progress Note    ADMIT DATE: 2/6/2023     75 yo F with PMH of HTN, DM, gout, BRAD, hypothyroidism s/p thyroidectomy, CAD with recurrent Afib; admitted for Dofetilide loading. Pharmacy asked to assist with electrolyte management (goal K > 4, Mg > 2) by NAKIA Key. LABORATORY:  Recent Labs     02/06/23  0820 02/07/23  0512   * 136   K 3.7 4.0   CL 99 100   CO2 20* 25   BUN 35* 29*   CREATININE 1.3 1.1   GLUCOSE 230* 94   MG 1.60* 2.20         Estimated Creatinine Clearance: 53 mL/min (based on SCr of 1.1 mg/dL). ASSESSMENT/PLAN:  1)  Dofetilide Loading --   Electrolytes--  Pharmacy asked to assist with electrolyte replacement. Goal K > 4, Mg > 2. Electrolytes at goal today (K 4, Mg 2.2) - no replacement needed. On home Magnesium oxide 400mg PO BID. Pharmacy will continue to monitor and replace electrolytes as appropriate.     Please call with questions--  Kam Rodas PharmD, BCPS  Wireless: [phone]   2/7/2023 7:47 AM

## 2023-02-07 NOTE — PLAN OF CARE
Problem: Discharge Planning  Goal: Discharge to home or other facility with appropriate resources  Outcome: Progressing  Flowsheets (Taken 2/7/2023 0342)  Discharge to home or other facility with appropriate resources:   Identify barriers to discharge with patient and caregiver   Arrange for needed discharge resources and transportation as appropriate   Identify discharge learning needs (meds, wound care, etc)   Arrange for interpreters to assist at discharge as needed   Refer to discharge planning if patient needs post-hospital services based on physician order or complex needs related to functional status, cognitive ability or social support system     Problem: Chronic Conditions and Co-morbidities  Goal: Patient's chronic conditions and co-morbidity symptoms are monitored and maintained or improved  Outcome: Progressing  Flowsheets (Taken 2/7/2023 0342)  Care Plan - Patient's Chronic Conditions and Co-Morbidity Symptoms are Monitored and Maintained or Improved:   Monitor and assess patient's chronic conditions and comorbid symptoms for stability, deterioration, or improvement   Collaborate with multidisciplinary team to address chronic and comorbid conditions and prevent exacerbation or deterioration   Update acute care plan with appropriate goals if chronic or comorbid symptoms are exacerbated and prevent overall improvement and discharge     Problem: Pain  Goal: Verbalizes/displays adequate comfort level or baseline comfort level  Outcome: Progressing  Flowsheets (Taken 2/7/2023 0342)  Verbalizes/displays adequate comfort level or baseline comfort level:   Encourage patient to monitor pain and request assistance   Assess pain using appropriate pain scale   Administer analgesics based on type and severity of pain and evaluate response   Consider cultural and social influences on pain and pain management   Implement non-pharmacological measures as appropriate and evaluate response   Notify Licensed Independent Practitioner if interventions unsuccessful or patient reports new pain 39 y/o F with PMHx of Protein S Deficiency, Left Jugular DVT, on Xarelto, HTN, Anxiety, Depression, s/p Appendectomy on 10/2018 presenting to the ED c/o LUE and RLE swelling and pain with bluish discoloration starting 3 days ago. States that she was told due to Protein S Deficiency, any exertion could increase chance of blood clot. A few days ago, pt was moving heavy boxes and soon after, developed swelling, discoloration, and pain in LUE. She then developed similar symptoms in RLE. No swelling or pain to RUE or LLE. No fevers, chills, SOB, CP, abd pain, N/V/D.

## 2023-02-07 NOTE — PLAN OF CARE
Problem: Discharge Planning  Goal: Discharge to home or other facility with appropriate resources  2/7/2023 0937 by Phillip Krishnan RN  Outcome: Progressing  Flowsheets  Taken 2/7/2023 3460  Discharge to home or other facility with appropriate resources:   Identify barriers to discharge with patient and caregiver   Identify discharge learning needs (meds, wound care, etc)  Taken 2/7/2023 0835  Discharge to home or other facility with appropriate resources: Identify barriers to discharge with patient and caregiver     Problem: Chronic Conditions and Co-morbidities  Goal: Patient's chronic conditions and co-morbidity symptoms are monitored and maintained or improved  2/7/2023 0937 by Phillip Krishnan RN  Outcome: Progressing  Flowsheets  Taken 2/7/2023 [address] - Patient's Chronic Conditions and Co-Morbidity Symptoms are Monitored and Maintained or Improved:   Monitor and assess patient's chronic conditions and comorbid symptoms for stability, deterioration, or improvement   Collaborate with multidisciplinary team to address chronic and comorbid conditions and prevent exacerbation or deterioration  Taken 2/7/2023 0835  Care Plan - Patient's Chronic Conditions and Co-Morbidity Symptoms are Monitored and Maintained or Improved: Monitor and assess patient's chronic conditions and comorbid symptoms for stability, deterioration, or improvement     Problem: Pain  Goal: Verbalizes/displays adequate comfort level or baseline comfort level  2/7/2023 0937 by Phillip Krishnan RN  Outcome: Progressing  Flowsheets (Taken 2/7/2023 2066)  Verbalizes/displays adequate comfort level or baseline comfort level:   Administer analgesics based on type and severity of pain and evaluate response   Encourage patient to monitor pain and request assistance     Problem: ABCDS Injury Assessment  Goal: Absence of physical injury  Outcome: Progressing

## 2023-02-07 NOTE — PROGRESS NOTES
Physician Progress Note      Che Parada  CSN #:                  [id number]  :                       1945  ADMIT DATE:       2023 7:07 AM  DISCH DATE:  RESPONDING  PROVIDER #:        Adolfo Brito CNP          QUERY TEXT:    Patient admitted with \"pAF, dofetilide loading\" and is maintained on Xarelto. If possible, please document in progress notes and discharge summary if you   are evaluating and/or treating any of the following: The medical record reflects the following:  Risk Factors: paroxysmal atrial fibrillation, has PPM for SSS  Clinical Indicators: per H/P \"On Xarelto 15 mg QD - no s/s bleeding -   continue\"; 12-lead EKG on  @ 08:23- \"Atrial rate-166,  Atrial fibrillation\"  Treatment: CBC, Protime and INR, inpatient meds- Xarelto 15 mg PO, Telemetry   monitoring  Options provided:  -- Secondary hypercoagulable state in a patient with paroxysmal atrial   fibrillation  -- Other - I will add my own diagnosis  -- Disagree - Not applicable / Not valid  -- Disagree - Clinically unable to determine / Unknown  -- Refer to Clinical Documentation Reviewer    PROVIDER RESPONSE TEXT:    This patient has secondary hypercoagulable state in a patient with paroxysmal   atrial fibrillation.     Query created by: Sona Simons on 2023 1:39 PM      Electronically signed by:  Adolfo Brito CNP 2023 1:49 PM

## 2023-02-08 LAB
ANION GAP SERPL CALCULATED.3IONS-SCNC: 10 MMOL/L (ref 3–16)
BUN BLDV-MCNC: 31 MG/DL (ref 7–20)
CALCIUM SERPL-MCNC: 9 MG/DL (ref 8.3–10.6)
CHLORIDE BLD-SCNC: 101 MMOL/L (ref 99–110)
CO2: 25 MMOL/L (ref 21–32)
CREAT SERPL-MCNC: 1.1 MG/DL (ref 0.8–1.3)
EKG ATRIAL RATE: 62 BPM
EKG DIAGNOSIS: NORMAL
EKG P AXIS: 21 DEGREES
EKG P-R INTERVAL: 246 MS
EKG Q-T INTERVAL: 470 MS
EKG QRS DURATION: 88 MS
EKG QTC CALCULATION (BAZETT): 477 MS
EKG R AXIS: 4 DEGREES
EKG T AXIS: 38 DEGREES
EKG VENTRICULAR RATE: 62 BPM
GFR SERPL CREATININE-BSD FRML MDRD: >60 ML/MIN/{1.73_M2}
GLUCOSE BLD-MCNC: 104 MG/DL (ref 70–99)
GLUCOSE BLD-MCNC: 124 MG/DL (ref 70–99)
GLUCOSE BLD-MCNC: 130 MG/DL (ref 70–99)
GLUCOSE BLD-MCNC: 179 MG/DL (ref 70–99)
GLUCOSE BLD-MCNC: 92 MG/DL (ref 70–99)
INR BLD: 1.5 (ref 0.87–1.14)
MAGNESIUM: 1.8 MG/DL (ref 1.8–2.4)
PERFORMED ON: ABNORMAL
POTASSIUM SERPL-SCNC: 4.3 MMOL/L (ref 3.5–5.1)
PROTHROMBIN TIME: 18.1 SEC (ref 11.7–14.5)
SODIUM BLD-SCNC: 136 MMOL/L (ref 136–145)

## 2023-02-08 PROCEDURE — 93005 ELECTROCARDIOGRAM TRACING: CPT | Performed by: INTERNAL MEDICINE

## 2023-02-08 PROCEDURE — 6360000002 HC RX W HCPCS: Performed by: NURSE PRACTITIONER

## 2023-02-08 PROCEDURE — 36415 COLL VENOUS BLD VENIPUNCTURE: CPT

## 2023-02-08 PROCEDURE — 83735 ASSAY OF MAGNESIUM: CPT

## 2023-02-08 PROCEDURE — 6370000000 HC RX 637 (ALT 250 FOR IP): Performed by: NURSE PRACTITIONER

## 2023-02-08 PROCEDURE — 6360000002 HC RX W HCPCS: Performed by: INTERNAL MEDICINE

## 2023-02-08 PROCEDURE — 93010 ELECTROCARDIOGRAM REPORT: CPT | Performed by: INTERNAL MEDICINE

## 2023-02-08 PROCEDURE — 85610 PROTHROMBIN TIME: CPT

## 2023-02-08 PROCEDURE — 80048 BASIC METABOLIC PNL TOTAL CA: CPT

## 2023-02-08 PROCEDURE — 99232 SBSQ HOSP IP/OBS MODERATE 35: CPT | Performed by: NURSE PRACTITIONER

## 2023-02-08 PROCEDURE — 2060000000 HC ICU INTERMEDIATE R&B

## 2023-02-08 RX ORDER — MAGNESIUM SULFATE IN WATER 40 MG/ML
2000 INJECTION, SOLUTION INTRAVENOUS ONCE
Status: COMPLETED | OUTPATIENT
Start: 2023-02-08 | End: 2023-02-08

## 2023-02-08 RX ADMIN — DOFETILIDE 125 MCG: 0.12 CAPSULE ORAL at 20:53

## 2023-02-08 RX ADMIN — METOPROLOL TARTRATE 25 MG: 25 TABLET, FILM COATED ORAL at 09:02

## 2023-02-08 RX ADMIN — GEMFIBROZIL 600 MG: 600 TABLET ORAL at 16:10

## 2023-02-08 RX ADMIN — FUROSEMIDE 20 MG: 20 TABLET ORAL at 09:02

## 2023-02-08 RX ADMIN — HYDRALAZINE HYDROCHLORIDE 10 MG: 20 INJECTION INTRAMUSCULAR; INTRAVENOUS at 23:02

## 2023-02-08 RX ADMIN — METOPROLOL TARTRATE 25 MG: 25 TABLET, FILM COATED ORAL at 20:53

## 2023-02-08 RX ADMIN — ALLOPURINOL 300 MG: 300 TABLET ORAL at 20:53

## 2023-02-08 RX ADMIN — Medication 400 MG: at 20:55

## 2023-02-08 RX ADMIN — RIVAROXABAN 20 MG: 20 TABLET, FILM COATED ORAL at 17:38

## 2023-02-08 RX ADMIN — ATORVASTATIN CALCIUM 40 MG: 40 TABLET, FILM COATED ORAL at 20:53

## 2023-02-08 RX ADMIN — GEMFIBROZIL 600 MG: 600 TABLET ORAL at 05:56

## 2023-02-08 RX ADMIN — METFORMIN HYDROCHLORIDE 1000 MG: 1000 TABLET ORAL at 09:02

## 2023-02-08 RX ADMIN — DOFETILIDE 125 MCG: 0.12 CAPSULE ORAL at 09:02

## 2023-02-08 RX ADMIN — Medication 400 MG: at 09:04

## 2023-02-08 RX ADMIN — IPRATROPIUM BROMIDE 2 SPRAY: 21 SPRAY NASAL at 09:15

## 2023-02-08 RX ADMIN — IPRATROPIUM BROMIDE 2 SPRAY: 21 SPRAY NASAL at 20:55

## 2023-02-08 RX ADMIN — MAGNESIUM SULFATE HEPTAHYDRATE 2000 MG: 40 INJECTION, SOLUTION INTRAVENOUS at 09:14

## 2023-02-08 RX ADMIN — METFORMIN HYDROCHLORIDE 1000 MG: 1000 TABLET ORAL at 17:38

## 2023-02-08 RX ADMIN — ASPIRIN 81 MG: 81 TABLET, COATED ORAL at 09:02

## 2023-02-08 RX ADMIN — TAMSULOSIN HYDROCHLORIDE 0.4 MG: 0.4 CAPSULE ORAL at 20:53

## 2023-02-08 RX ADMIN — TRAZODONE HYDROCHLORIDE 100 MG: 100 TABLET ORAL at 20:54

## 2023-02-08 RX ADMIN — LEVOTHYROXINE SODIUM 150 MCG: 150 TABLET ORAL at 09:01

## 2023-02-08 NOTE — PROGRESS NOTES
Electrophysiology - PROGRESS NOTE    Admit Date: 2/6/2023     Chief Complaint: pAF     Interval History:   Patient seen and examined and notes reviewed. Patient is being followed for pAF. Patient has longstanding history of paroxysmal atrial fibrillation. He had previously been on sotalol with recurrent AF noted on his pacemaker. His overall AF burden has been increasing. Patient is symptomatic at times. We discontinued his sotalol x3 days and he has been admitted for dofetilide loading. He was initiated on 250 mcg once a day. His second dose was this morning. Last evening dose missed due to initial dose being given so late. Patient had gone into atrial fibrillation early this morning. He did receive his a.m. dose of dofetilide and converted to NSR. He did have what appeared to be NSVT on telemetry however after reviewing telemetry strips with Dr. 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 He did complain of tremors. Amlodipine stopped and placed on metoprolol 25 mg BID. BP better controlled overnight. Dofetilide dose adjusted to 125 mcg BID yesterday after reviewing EKG with Dr. Ivania Tracy. Stable QTc overnight.     In: 1700 [P.O.:1700]  Out: 1650    Wt Readings from Last 2 Encounters:   02/08/23 170 lb 6.7 oz (77.3 kg)   12/29/22 171 lb (77.6 kg)       Data:   Scheduled Meds:   Scheduled Meds:   magnesium sulfate  2,000 mg IntraVENous Once    rivaroxaban  20 mg Oral Daily    metoprolol tartrate  25 mg Oral BID    dofetilide  125 mcg Oral 2 times per day    allopurinol  300 mg Oral Nightly    aspirin  81 mg Oral Daily    atorvastatin  40 mg Oral Nightly    furosemide  20 mg Oral Daily    gemfibrozil  600 mg Oral BID AC    ipratropium  2 spray Each Nostril Q12H    levothyroxine  150 mcg Oral Daily    magnesium oxide  400 mg Oral BID    metFORMIN  1,000 mg Oral BID WC    tamsulosin  0.4 mg Oral Nightly    traZODone  100 mg Oral Nightly    insulin lispro  0-4 Units SubCUTAneous TID WC    insulin lispro  0-4 Units SubCUTAneous Nightly     Continuous Infusions:   dextrose       PRN Meds:.acetaminophen, glucose, dextrose bolus **OR** dextrose bolus, glucagon (rDNA), dextrose, perflutren lipid microspheres, hydrALAZINE  Continuous Infusions:   dextrose         Intake/Output Summary (Last 24 hours) at 2/8/2023 0814  Last data filed at 2/8/2023 0414  Gross per 24 hour   Intake 1700 ml   Output 1650 ml   Net 50 ml       CBC:   Lab Results   Component Value Date/Time    WBC 5.8 02/01/2023 01:57 PM    HGB 12.8 02/01/2023 01:57 PM     02/01/2023 01:57 PM     BMP:  Lab Results   Component Value Date/Time     02/08/2023 04:57 AM    K 4.3 02/08/2023 04:57 AM    K 5.1 10/10/2021 10:35 PM     02/08/2023 04:57 AM    CO2 25 02/08/2023 04:57 AM    BUN 31 02/08/2023 04:57 AM    CREATININE 1.1 02/08/2023 04:57 AM    GLUCOSE 92 02/08/2023 04:57 AM     INR:   Lab Results   Component Value Date/Time    INR 1.50 02/08/2023 04:57 AM    INR 1.65 02/07/2023 05:12 AM    INR 1.58 02/06/2023 08:20 AM        CARDIAC LABS  ENZYMES:No results for input(s): CKMB, CKMBINDEX, TROPONINI in the last 72 hours.     Invalid input(s): CKTOTAL;3  FASTING LIPID PANEL:  Lab Results   Component Value Date/Time    HDL 60 04/11/2022 10:52 AM    HDL 51 09/13/2011 10:53 AM    LDLCALC 54 04/11/2022 10:52 AM    TRIG 58 04/11/2022 10:52 AM    TSH 4.00 01/29/2020 10:11 AM     LIVER PROFILE:  Lab Results   Component Value Date/Time    AST 21 02/01/2023 01:57 PM    AST 34 03/03/2021 10:54 AM    ALT 10 02/01/2023 01:57 PM    ALT 21 03/03/2021 10:54 AM     -----------------------------------------------------------------  Telemetry: Personally reviewed NSR    Objective:   Vitals: /77   Pulse 69   Temp 97.5 °F (36.4 °C) (Oral)   Resp 20   Ht 5' 8\" (1.727 m)   Wt 170 lb 6.7 oz (77.3 kg)   SpO2 99%   BMI 25.91 kg/m²   General appearance: alert, appears stated age and cooperative, No acute distress   Eyes: Conjunctiva and pupils normal and reactive  Skin: Skin color, texture, turgor normal. No rashes or ecchymosis. Neck: no JVD, supple, symmetrical, trachea midline   Lungs: , no accessory muscle use, no respiratory distress  Heart: Tachycardic, irregular  Abdomen: soft, non-tender; bowel sounds normal  Extremities: No edema, DP +  Psychiatric: normal insight and affect    Patient Active Problem List:     Hyperlipidemia     Old myocardial infarction     Essential hypertension     Coronary artery disease involving native coronary artery of native heart without angina pectoris     Palpitations     Shortness of breath     History of coronary angioplasty     Syncope     Lightheaded     Sick sinus syndrome (HCC)     Paroxysmal atrial fibrillation ([facility])     Dizziness and giddiness     Bradycardia     Cardiac pacemaker in situ     Acute diastolic (congestive) heart failure ([facility])     Community acquired pneumonia     Pneumonia     Chronic diastolic (congestive) heart failure ([facility])     Nonrheumatic aortic valve stenosis     PAF (paroxysmal atrial fibrillation) ([facility])     A-fib ([facility])        Assessment & Plan:      pAF  SSS  S/p PPM  CAD  HTN  On OAC  Dofetilide laoding        Cardiac Hx: 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 Ruth Ann Borja, s/s pAF, s/s post conversion pauses while on large dose of fito agents, d/c'd BB, started dilt and low dose sotalol with low AF burden, >7 sec pause noted on outpt monitor, s/p dual-chamber Medtronic PPM on (2/15/2017, Dr. Yelena Cedillo), recurrent AF, here for dofetilide loading. [id number] 5. TSH 1.09 (2022).       AF/SSS  - In NSR   - Failed sotalol  - CrCl 58.69  - On dofetilide 125 mcg BID - reviewed EKG with Dr. Andrea Eaton - will continue same dose  - Keep on tele to monitor for torsades  - EKG 2 hours after each oral dose  - Keep K+ > 4.0 and Mg > 2.0 - replacement ordered  - Reviewed labs  - On Xarelto 20 mg QD - no s/s bleeding - continue  - S/p dual chamber Medtronic PPM (2/15/2017, Dr. Gladies Skiff)  - ECG ordered and results personally reviewed      Chronic diastolic HF  - Appears euvolemic  - EF 60-65%  - On Lasix 20 mg daily - will hold tomorrow am     HTN  - BP controlled overnight  - Tried on amlodipine and developed tremors  - Changed to metoprolol 25 mg BID - no tremors now     Mod to sev AS  - Moderate AS per echo - no change from last    CAD  - No s/s  - On ASA/statin  - Follows with Dr. Raffi Mcgowan     EF of 53-31%  No systolic HF  ASA and Statin for CAD  Anticoagulation for AF   No tobacco use     All questions and concerns were addressed to the patient/family. Alternatives to my treatment were discussed. The note was completed using EMR. Every effort was made to ensure accuracy; however, inadvertent computerized transcription errors may be present.      Priyank Garcia [address]

## 2023-02-08 NOTE — PROGRESS NOTES
Clinical Pharmacy Progress Note    ADMIT DATE: 2/6/2023     75 yo F with PMH of HTN, DM, gout, BARD, hypothyroidism s/p thyroidectomy, CAD with recurrent Afib; admitted for Dofetilide loading. Pharmacy asked to assist with electrolyte management (goal K > 4, Mg > 2) by NAKIA Dutta. LABORATORY:  Recent Labs     02/07/23  0512 02/08/23  0457    136   K 4.0 4.3    101   CO2 25 25   BUN 29* 31*   CREATININE 1.1 1.1   GLUCOSE 94 92   MG 2.20 1.80         Estimated Creatinine Clearance: 53 mL/min (based on SCr of 1.1 mg/dL). ASSESSMENT/PLAN:  1)  Dofetilide Loading --   Electrolytes--  Pharmacy asked to assist with electrolyte replacement. Goal K > 4, Mg > 2.  K at goal today (4.3) - no replacement needed. Mg below goal (1.8) -- will order Magnesium 2g IV x1 as replacement. Remains on home Magnesium oxide 400mg PO BID. Pharmacy will continue to monitor and replace electrolytes as appropriate.     Please call with questions--  Abrahan Lipscomb PharmD, BCPS  Wireless: [phone]   2/8/2023 7:42 AM

## 2023-02-08 NOTE — PLAN OF CARE
Problem: Cardiovascular - Adult  Goal: Maintains optimal cardiac output and hemodynamic stability  Outcome: Progressing  Flowsheets (Taken 2/8/2023 0205)  Maintains optimal cardiac output and hemodynamic stability:   Monitor blood pressure and heart rate   Assess for signs of decreased cardiac output  Note: Pt's VSS. Atrial paced on telemetry. Pt is Tikosyn loading. EKG obtained 2 hours later. Will continue to monitor. Problem: Pain  Goal: Verbalizes/displays adequate comfort level or baseline comfort level  Outcome: Progressing  Flowsheets (Taken 2/8/2023 0205)  Verbalizes/displays adequate comfort level or baseline comfort level:   Encourage patient to monitor pain and request assistance   Assess pain using appropriate pain scale   Administer analgesics based on type and severity of pain and evaluate response   Implement non-pharmacological measures as appropriate and evaluate response  Note: Pt denies pain at this time.

## 2023-02-09 VITALS
BODY MASS INDEX: 25.89 KG/M2 | SYSTOLIC BLOOD PRESSURE: 112 MMHG | TEMPERATURE: 97.9 F | DIASTOLIC BLOOD PRESSURE: 63 MMHG | RESPIRATION RATE: 18 BRPM | HEART RATE: 60 BPM | WEIGHT: 170.86 LBS | HEIGHT: 68 IN | OXYGEN SATURATION: 95 %

## 2023-02-09 LAB
ANION GAP SERPL CALCULATED.3IONS-SCNC: 11 MMOL/L (ref 3–16)
BUN BLDV-MCNC: 28 MG/DL (ref 7–20)
CALCIUM SERPL-MCNC: 9 MG/DL (ref 8.3–10.6)
CHLORIDE BLD-SCNC: 100 MMOL/L (ref 99–110)
CO2: 25 MMOL/L (ref 21–32)
CREAT SERPL-MCNC: 1 MG/DL (ref 0.8–1.3)
EKG ATRIAL RATE: 51 BPM
EKG ATRIAL RATE: 51 BPM
EKG ATRIAL RATE: 63 BPM
EKG ATRIAL RATE: 78 BPM
EKG DIAGNOSIS: NORMAL
EKG P AXIS: -34 DEGREES
EKG P-R INTERVAL: 246 MS
EKG P-R INTERVAL: 246 MS
EKG P-R INTERVAL: 252 MS
EKG Q-T INTERVAL: 448 MS
EKG Q-T INTERVAL: 458 MS
EKG Q-T INTERVAL: 458 MS
EKG Q-T INTERVAL: 500 MS
EKG QRS DURATION: 100 MS
EKG QRS DURATION: 82 MS
EKG QRS DURATION: 82 MS
EKG QRS DURATION: 84 MS
EKG QTC CALCULATION (BAZETT): 462 MS
EKG QTC CALCULATION (BAZETT): 483 MS
EKG QTC CALCULATION (BAZETT): 483 MS
EKG QTC CALCULATION (BAZETT): 500 MS
EKG R AXIS: 30 DEGREES
EKG R AXIS: 74 DEGREES
EKG R AXIS: 74 DEGREES
EKG R AXIS: 81 DEGREES
EKG T AXIS: 0 DEGREES
EKG T AXIS: 0 DEGREES
EKG T AXIS: 22 DEGREES
EKG T AXIS: 8 DEGREES
EKG VENTRICULAR RATE: 60 BPM
EKG VENTRICULAR RATE: 64 BPM
EKG VENTRICULAR RATE: 67 BPM
EKG VENTRICULAR RATE: 67 BPM
GFR SERPL CREATININE-BSD FRML MDRD: >60 ML/MIN/{1.73_M2}
GLUCOSE BLD-MCNC: 87 MG/DL (ref 70–99)
GLUCOSE BLD-MCNC: 90 MG/DL (ref 70–99)
MAGNESIUM: 1.9 MG/DL (ref 1.8–2.4)
PERFORMED ON: NORMAL
POTASSIUM SERPL-SCNC: 4.3 MMOL/L (ref 3.5–5.1)
SODIUM BLD-SCNC: 136 MMOL/L (ref 136–145)

## 2023-02-09 PROCEDURE — 83735 ASSAY OF MAGNESIUM: CPT

## 2023-02-09 PROCEDURE — 80048 BASIC METABOLIC PNL TOTAL CA: CPT

## 2023-02-09 PROCEDURE — 93010 ELECTROCARDIOGRAM REPORT: CPT | Performed by: INTERNAL MEDICINE

## 2023-02-09 PROCEDURE — 99239 HOSP IP/OBS DSCHRG MGMT >30: CPT | Performed by: NURSE PRACTITIONER

## 2023-02-09 PROCEDURE — 6370000000 HC RX 637 (ALT 250 FOR IP): Performed by: NURSE PRACTITIONER

## 2023-02-09 PROCEDURE — 6360000002 HC RX W HCPCS: Performed by: NURSE PRACTITIONER

## 2023-02-09 PROCEDURE — 93005 ELECTROCARDIOGRAM TRACING: CPT | Performed by: INTERNAL MEDICINE

## 2023-02-09 PROCEDURE — 36415 COLL VENOUS BLD VENIPUNCTURE: CPT

## 2023-02-09 RX ORDER — DOFETILIDE 0.12 MG/1
125 CAPSULE ORAL EVERY 12 HOURS SCHEDULED
Qty: 180 CAPSULE | Refills: 3 | Status: SHIPPED | OUTPATIENT
Start: 2023-02-09

## 2023-02-09 RX ORDER — DOFETILIDE 0.12 MG/1
125 CAPSULE ORAL EVERY 12 HOURS SCHEDULED
Qty: 14 CAPSULE | Refills: 0 | Status: SHIPPED | OUTPATIENT
Start: 2023-02-09 | End: 2023-02-09 | Stop reason: SDUPTHER

## 2023-02-09 RX ORDER — MAGNESIUM SULFATE IN WATER 40 MG/ML
2000 INJECTION, SOLUTION INTRAVENOUS ONCE
Status: COMPLETED | OUTPATIENT
Start: 2023-02-09 | End: 2023-02-09

## 2023-02-09 RX ADMIN — METFORMIN HYDROCHLORIDE 1000 MG: 1000 TABLET ORAL at 08:37

## 2023-02-09 RX ADMIN — IPRATROPIUM BROMIDE 2 SPRAY: 21 SPRAY NASAL at 11:10

## 2023-02-09 RX ADMIN — MAGNESIUM SULFATE HEPTAHYDRATE 2000 MG: 40 INJECTION, SOLUTION INTRAVENOUS at 08:32

## 2023-02-09 RX ADMIN — Medication 400 MG: at 08:38

## 2023-02-09 RX ADMIN — GEMFIBROZIL 600 MG: 600 TABLET ORAL at 07:17

## 2023-02-09 RX ADMIN — LEVOTHYROXINE SODIUM 150 MCG: 150 TABLET ORAL at 08:37

## 2023-02-09 RX ADMIN — METOPROLOL TARTRATE 25 MG: 25 TABLET, FILM COATED ORAL at 08:37

## 2023-02-09 RX ADMIN — DOFETILIDE 125 MCG: 0.12 CAPSULE ORAL at 09:07

## 2023-02-09 RX ADMIN — ASPIRIN 81 MG: 81 TABLET, COATED ORAL at 08:37

## 2023-02-09 ASSESSMENT — PAIN SCALES - GENERAL
PAINLEVEL_OUTOF10: 0
PAINLEVEL_OUTOF10: 0

## 2023-02-09 NOTE — PLAN OF CARE
Problem: Discharge Planning  Goal: Discharge to home or other facility with appropriate resources  2/9/2023 0052 by Author Marcie RN  Outcome: Progressing  Flowsheets (Taken 2/9/2023 0052)  Discharge to home or other facility with appropriate resources: Identify barriers to discharge with patient and caregiver     Problem: Pain  Goal: Verbalizes/displays adequate comfort level or baseline comfort level  2/9/2023 0052 by Author Marcie RN  Outcome: Progressing  Flowsheets (Taken 2/9/2023 0052)  Verbalizes/displays adequate comfort level or baseline comfort level:   Encourage patient to monitor pain and request assistance   Assess pain using appropriate pain scale   Administer analgesics based on type and severity of pain and evaluate response   Implement non-pharmacological measures as appropriate and evaluate response   Consider cultural and social influences on pain and pain management     Problem: Cardiovascular - Adult  Goal: Maintains optimal cardiac output and hemodynamic stability  2/9/2023 0052 by Author Marcie RN  Outcome: Progressing  Flowsheets (Taken 2/9/2023 0052)  Maintains optimal cardiac output and hemodynamic stability:   Monitor blood pressure and heart rate   Monitor urine output and notify Licensed Independent Practitioner for values outside of normal range   Assess for signs of decreased cardiac output   Administer fluid and/or volume expanders as ordered   Administer vasoactive medications as ordered

## 2023-02-09 NOTE — PROGRESS NOTES
Pt d/c at 1230. IV and telemetry removed. Meds to Beds delivered. Pt given written scripts in addition to Meds to Beds. D/C education and instructions reviewed and sent w/ pt. All questions asked and answered.

## 2023-02-09 NOTE — DISCHARGE SUMMARY
EP Discharge Summary  [facility] Eron Bellamy CNP  2/9/23    Patient :Mariann Lepe  Room/Bed: 2595/6569-53  Medical Record: [id number]  YOB: 1945    Admit date: 2/6/2023  Discharge date and time: 02/09/23     Admitting Physician: Yayo Ulloa MD   Discharge Physician: Yayo Ulloa MD    Admission Diagnoses: PAF (paroxysmal atrial fibrillation) ([facility] [I48.0]  [facility] [I48.91]  Discharge Diagnoses: pAF. 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 Tiarra Patel, s/s pAF, s/s post conversion pauses while on large dose of fito agents, d/c'd BB, started dilt and low dose sotalol with low AF burden, >7 sec pause noted on outpt monitor, s/p dual-chamber Medtronic PPM on (2/15/2017, Dr. Delphine Carlson), recurrent AF, admitted for dofetilide loading. Initiated on 250 mcg with EKG changes noted, pt on trazodone, and dose adjusted to 125 mcg BID. QTc has been stable. Labs monitored and K+, Mg replaced as needed. Last echo 2/2022 showed mod AS and echo repeated while in house. AS unchanged. Patient to home in stable condition. BMP, magnesium in 2 weeks.     Consults: none    Significant Diagnostic Studies: labs: see chart    Discharge Medications:   Current Discharge Medication List        CONTINUE these medications which have NOT CHANGED    Details   magnesium oxide (MAG-OX) 400 MG tablet Take 1 tablet by mouth 2 times daily  Qty: 60 tablet, Refills: 5      furosemide (LASIX) 20 MG tablet TAKE 3 TABLETS BY MOUTH DAILY  Qty: 270 tablet, Refills: 3      rivaroxaban (XARELTO) 15 MG TABS tablet Take 15 mg by mouth      Multiple Vitamins-Minerals (PRESERVISION AREDS PO) Take 2 capsules by mouth 2 times daily      ipratropium (ATROVENT) 0.03 % nasal spray 2 sprays by Each Nostril route every 12 hours      oxybutynin (DITROPAN) 5 MG/5ML syrup Take 5 mg by mouth as needed       tamsulosin (FLOMAX) 0.4 MG capsule Take 0.4 mg by mouth nightly      traZODone (DESYREL) 100 MG tablet Take 100 mg by mouth nightly      atorvastatin (LIPITOR) 40 MG tablet Take 40 mg by mouth nightly       aspirin 81 MG tablet Take 81 mg by mouth daily. levothyroxine (SYNTHROID) 150 MCG tablet Take 150 mcg by mouth daily. Associated Diagnoses: Heart palpitations      allopurinol (ZYLOPRIM) 300 MG tablet Take 300 mg by mouth nightly       gemfibrozil (LOPID) 600 MG tablet Take 600 mg by mouth 2 times daily (before meals). metformin (GLUCOPHAGE) 1000 MG tablet Take 1,000 mg by mouth 2 times daily (with meals). Dofetilide 125 mcg twice daily  Metoprolol 25 mg twice daily     STOP taking these medications       sotalol (BETAPACE) 80 MG tablet Comments:   Reason for Stopping:               Discharge Exam:  Carotid Arteries: normal pulsation bilaterally  Lungs: clear to auscultation without wheezes or rales  Heart: regular rate and rhythm, S1, S2 normal, no murmur, click, rub or gallop  Extremities: negative  Incision: n/a    Disposition: home    Patient Instructions: Activity: See discharge instructions,  Diet: cardiac diet. Wound Care: See discharge instructions. Follow-up with MD at scheduled appointment. Call [phone] with any questions. Signed:  NAKIA Vora CNP  2/9/2023  8:04 AM    Greater than 40 minutes total spent on discharge.

## 2023-02-09 NOTE — PLAN OF CARE
Problem: Chronic Conditions and Co-morbidities  Goal: Patient's chronic conditions and co-morbidity symptoms are monitored and maintained or improved  Outcome: Completed   V/S stable. Pt a paced on telemetry. Rm air. Alert and oriented. Problem: Cardiovascular - Adult  Goal: Maintains optimal cardiac output and hemodynamic stability  2/9/2023 1220 by Prakash Eaton RN  Outcome: Completed     Problem: Discharge Planning  Goal: Discharge to home or other facility with appropriate resources  2/9/2023 1220 by Prakash Eaton RN  Outcome: Completed  2/9/2023 0052 by Sadaf Velasco RN  Outcome: Progressing  Flowsheets (Taken 2/9/2023 0052)  Discharge to home or other facility with appropriate resources: Identify barriers to discharge with patient and caregiver  2/9/2023 0051 by Sadaf Velasco RN  Outcome: Progressing  Flowsheets (Taken 2/9/2023 0051)  Discharge to home or other facility with appropriate resources: Identify barriers to discharge with patient and caregiver  IV and telemetry removed. Meds to beds received. D/C education and instructions reviewed and completed.

## 2023-02-09 NOTE — PROGRESS NOTES
Clinical Pharmacy Progress Note    ADMIT DATE: 2/6/2023     75 yo F with PMH of HTN, DM, gout, BRAD, hypothyroidism s/p thyroidectomy, CAD with recurrent Afib; admitted for Dofetilide loading. Pharmacy asked to assist with electrolyte management (goal K > 4, Mg > 2) by NAKIA Dutta. LABORATORY:  Recent Labs     02/08/23  0457 02/09/23  0430    136   K 4.3 4.3    100   CO2 25 25   BUN 31* 28*   CREATININE 1.1 1.0   GLUCOSE 92 87   MG 1.80 1.90         Estimated Creatinine Clearance: 58 mL/min (based on SCr of 1 mg/dL). ASSESSMENT/PLAN:  1)  Dofetilide Loading --   Electrolytes--  Pharmacy asked to assist with electrolyte replacement. Goal K > 4, Mg > 2.  K at goal today (4.3) - no replacement needed. Mg below goal (1.9) -- will order Magnesium 2g IV x1 as replacement. Remains on home Magnesium oxide 400mg PO BID. Pharmacy will continue to monitor and replace electrolytes as appropriate.     Please call with questions--  Abrahan Lipscomb PharmD, BCPS  Wireless: [phone]   2/9/2023 8:00 AM

## 2023-02-10 ENCOUNTER — OFFICE VISIT (OUTPATIENT)
Dept: CARDIOLOGY CLINIC | Age: 78
End: 2023-02-10
Payer: MEDICARE

## 2023-02-10 VITALS
SYSTOLIC BLOOD PRESSURE: 106 MMHG | DIASTOLIC BLOOD PRESSURE: 64 MMHG | HEART RATE: 50 BPM | BODY MASS INDEX: 25.85 KG/M2 | WEIGHT: 170 LBS

## 2023-02-10 DIAGNOSIS — Z98.61 HISTORY OF PTCA: ICD-10-CM

## 2023-02-10 DIAGNOSIS — I50.32 CHRONIC DIASTOLIC CONGESTIVE HEART FAILURE (HCC): ICD-10-CM

## 2023-02-10 DIAGNOSIS — I35.0 NONRHEUMATIC AORTIC VALVE STENOSIS: ICD-10-CM

## 2023-02-10 DIAGNOSIS — I48.0 PAROXYSMAL ATRIAL FIBRILLATION (HCC): ICD-10-CM

## 2023-02-10 DIAGNOSIS — Z95.0 PACEMAKER: ICD-10-CM

## 2023-02-10 DIAGNOSIS — I25.10 CAD IN NATIVE ARTERY: Primary | ICD-10-CM

## 2023-02-10 DIAGNOSIS — I10 ESSENTIAL HYPERTENSION: ICD-10-CM

## 2023-02-10 DIAGNOSIS — I49.5 SSS (SICK SINUS SYNDROME) (HCC): ICD-10-CM

## 2023-02-10 PROCEDURE — 99214 OFFICE O/P EST MOD 30 MIN: CPT | Performed by: INTERNAL MEDICINE

## 2023-02-10 PROCEDURE — 3074F SYST BP LT 130 MM HG: CPT | Performed by: INTERNAL MEDICINE

## 2023-02-10 PROCEDURE — 1036F TOBACCO NON-USER: CPT | Performed by: INTERNAL MEDICINE

## 2023-02-10 PROCEDURE — G8427 DOCREV CUR MEDS BY ELIG CLIN: HCPCS | Performed by: INTERNAL MEDICINE

## 2023-02-10 PROCEDURE — G8417 CALC BMI ABV UP PARAM F/U: HCPCS | Performed by: INTERNAL MEDICINE

## 2023-02-10 PROCEDURE — G8484 FLU IMMUNIZE NO ADMIN: HCPCS | Performed by: INTERNAL MEDICINE

## 2023-02-10 PROCEDURE — 3078F DIAST BP <80 MM HG: CPT | Performed by: INTERNAL MEDICINE

## 2023-02-10 PROCEDURE — 1111F DSCHRG MED/CURRENT MED MERGE: CPT | Performed by: INTERNAL MEDICINE

## 2023-02-10 PROCEDURE — 1123F ACP DISCUSS/DSCN MKR DOCD: CPT | Performed by: INTERNAL MEDICINE

## 2023-02-10 ASSESSMENT — ENCOUNTER SYMPTOMS
CHOKING: 0
SHORTNESS OF BREATH: 0
CHEST TIGHTNESS: 0
ABDOMINAL DISTENTION: 0
APNEA: 0
COUGH: 0

## 2023-02-10 NOTE — PROGRESS NOTES
Subjective:      Patient ID: Estevan Hsu is a 76 y.o. male. Here for follow up afib/sss/pacer/htn/cad/PTCA/CHF. No s/p dofetilide loading. Feeling good. Exercising without problem. Works out with  2x / wk. No pnd or orthopnea. No edema. Wt stable. BP good at home. No chest pain. No sob. Rhythm good. No syncope. Past Medical History:   Diagnosis Date    Aortic stenosis 2015 Feb 2019 mean gradient 35mg Hg    Atrial fibrillation ([facility] [address] 07/2012    Saw cardiologist for palps. Event recorder showed AF about 5% of the time. Warfarin started. Became persistent in March 2019 when admitted with pneumonia. CAD (coronary artery disease), native coronary artery Dec 18, 2007    Presented with palpitation to ER. Slight inc. in S. troponin. Cor. angio showed 90% L. circ. Stent placed. (Dr Glenna Lyn. Diabetes mellitus ([facility] 1992    HbA1C in Jennifer '15 was 6.9 on metformin and glipizide. Diastolic congestive heart failure ([facility] [address] 08/2006    presented with wheeze, [id number]. cardiac cath to r/o ischemia. Diffuse CAD no critical stenoses. LVEF 55% LVEDP 31. Essential hypertension since 1980    Difficult to control with need for minoxidil in addition to ARB,CCB,BB and diuretic. Gout     Podagra intermittently over the years. Uric acid 9.4 in 2011. Allopurinol started (with colchicine for 3 months)    Hypothyroid March 2011    Total thyroidectomy for indeterminate nodule. Proved benign. Left carotid bruit Oct 2014    Discovered at routine exam. Carotid Doppler showed bilateral stenosis <50%    Obstructive sleep apnea 1997    as of 2019 still using CPAP    Peripheral neuropathy     2/2 DM. Pneumonia 17/5131    complicated by hypotension and overdiuresis leading to REYNALDO. S.Cr 1.7 on 3/9/19. Improved to 1.3 on 3/11/19    Sick sinus syndrome (HCC) 02/16/2017    PIP placed because of 7 sec pauses on sotalol.      Past Surgical History:   Procedure Laterality Date    CHOLECYSTECTOMY, OPEN  1993 CORONARY ANGIOPLASTY  12/17/07    has stent per pt    DIAGNOSTIC CARDIAC CATH LAB PROCEDURE  12/17/07    PACEMAKER INSERTION  02/2017    Sick sinus sundrome with 7 sec pauses on sotalol. THYROIDECTOMY  2011    Benign    TOTAL KNEE ARTHROPLASTY Right 3/12/14     Social History     Socioeconomic History    Marital status: Single     Spouse name: Not on file    Number of children: Not on file    Years of education: Not on file    Highest education level: Not on file   Occupational History    Not on file   Tobacco Use    Smoking status: Never    Smokeless tobacco: Never   Substance and Sexual Activity    Alcohol use: No    Drug use: No    Sexual activity: Not Currently     Comment: Single   Other Topics Concern    Not on file   Social History Narrative    Not on file     Social Determinants of Health     Financial Resource Strain: Not on file   Food Insecurity: Not on file   Transportation Needs: Not on file   Physical Activity: Not on file   Stress: Not on file   Social Connections: Not on file   Intimate Partner Violence: Not on file   Housing Stability: Not on file       FH reviewed, denies FH cardiac issues. Vitals:    02/10/23 0958   BP: 106/64   Pulse: 50           Wt 170      Review of Systems   Constitutional:  Negative for activity change and fatigue. Respiratory:  Negative for apnea, cough, choking, chest tightness and shortness of breath. Cardiovascular:  Negative for chest pain, palpitations and leg swelling. No PND or orthopnea. No tachycardia. Gastrointestinal:  Negative for abdominal distention. Musculoskeletal:  Negative for myalgias. Neurological:  Negative for dizziness, syncope and light-headedness. Psychiatric/Behavioral:  Negative for agitation, behavioral problems and confusion. All other systems reviewed negative as done. Objective:   Physical Exam  Constitutional:       General: He is not in acute distress. Appearance: He is well-developed.    HENT: Head: Normocephalic and atraumatic. Eyes:      General:         Right eye: No discharge. Left eye: No discharge. Conjunctiva/sclera: Conjunctivae normal.   Neck:      Vascular: No JVD. Cardiovascular:      Rate and Rhythm: Normal rate and regular rhythm. Pulses:           Radial pulses are 2+ on the right side and 2+ on the left side. Heart sounds: S1 normal and S2 normal. Murmur heard. No gallop. Comments: 2/6 syst M  Irregular at times  Pulmonary:      Effort: Pulmonary effort is normal.      Breath sounds: Normal breath sounds. No wheezing or rales. Abdominal:      General: Bowel sounds are normal.      Palpations: Abdomen is soft. Tenderness: There is no abdominal tenderness. Musculoskeletal:         General: Normal range of motion. Cervical back: Normal range of motion and neck supple. Skin:     General: Skin is warm and dry. Neurological:      Mental Status: He is alert and oriented to person, place, and time. Psychiatric:         Behavior: Behavior normal.         Thought Content: Thought content normal.       Assessment:       Diagnosis Orders   1. CAD in native artery        2. History of PTCA        3. Essential hypertension        4. Paroxysmal atrial fibrillation (HCC)        5. SSS (sick sinus syndrome) (HCC)        6. Pacemaker        7. Nonrheumatic aortic valve stenosis        8. Chronic diastolic congestive heart failure ([address]                Plan:      CV stable. Feeling much better. Rhythm stable. Now s/p dofetilide loading. BP good. EP following pacemaker/afib. Rhythm stable. Exercising 2x per wik with . Wt stable. Will continue sotalol for afib. On AC, no bleeding issues. Continue on Xarelto 15 mg qd. Will continue Aldactone/lasix. Recommended diet, exercise and wt loss. Reviewed previous records and testing including myoview 7/20 and echo 2/23. No changes. Continue to monitor. Follow up 3 months.

## 2023-02-24 DIAGNOSIS — I48.0 PAROXYSMAL ATRIAL FIBRILLATION (HCC): ICD-10-CM

## 2023-02-24 LAB
ANION GAP SERPL CALCULATED.3IONS-SCNC: 13 MMOL/L (ref 3–16)
BUN BLDV-MCNC: 31 MG/DL (ref 7–20)
CALCIUM SERPL-MCNC: 9.6 MG/DL (ref 8.3–10.6)
CHLORIDE BLD-SCNC: 101 MMOL/L (ref 99–110)
CO2: 24 MMOL/L (ref 21–32)
CREAT SERPL-MCNC: 1 MG/DL (ref 0.8–1.3)
GFR SERPL CREATININE-BSD FRML MDRD: >60 ML/MIN/{1.73_M2}
GLUCOSE BLD-MCNC: 124 MG/DL (ref 70–99)
MAGNESIUM: 1.8 MG/DL (ref 1.8–2.4)
POTASSIUM SERPL-SCNC: 4.8 MMOL/L (ref 3.5–5.1)
SODIUM BLD-SCNC: 138 MMOL/L (ref 136–145)

## 2023-03-14 ENCOUNTER — NURSE ONLY (OUTPATIENT)
Dept: CARDIOLOGY CLINIC | Age: 78
End: 2023-03-14
Payer: MEDICARE

## 2023-03-14 DIAGNOSIS — I48.0 PAF (PAROXYSMAL ATRIAL FIBRILLATION) (HCC): ICD-10-CM

## 2023-03-14 DIAGNOSIS — I49.5 SICK SINUS SYNDROME (HCC): ICD-10-CM

## 2023-03-14 DIAGNOSIS — R00.1 BRADYCARDIA: ICD-10-CM

## 2023-03-14 DIAGNOSIS — Z95.0 CARDIAC PACEMAKER IN SITU: Primary | ICD-10-CM

## 2023-03-14 PROCEDURE — 93296 REM INTERROG EVL PM/IDS: CPT | Performed by: INTERNAL MEDICINE

## 2023-03-14 PROCEDURE — 93294 REM INTERROG EVL PM/LDLS PM: CPT | Performed by: INTERNAL MEDICINE

## 2023-03-20 NOTE — PROGRESS NOTES
Remote transmission received from patient's dual chamber pacemaker monitor at home. Transmission shows normal sensing and pacing function. Noted AT/AF/L, 4.0% burden (Tikosyn, Xarelto, Lopressor). Ap 89.5%   0.5% (MVP On)  PVCs 1.6/hr    End of 91-day monitoring period 3/14/23. EP physician will review. See interrogation under cardiology tab in the [address] [place] [address] [address] field for more details.

## 2023-05-10 ENCOUNTER — OFFICE VISIT (OUTPATIENT)
Dept: CARDIOLOGY CLINIC | Age: 78
End: 2023-05-10
Payer: MEDICARE

## 2023-05-10 VITALS
BODY MASS INDEX: 26.46 KG/M2 | HEART RATE: 68 BPM | DIASTOLIC BLOOD PRESSURE: 70 MMHG | WEIGHT: 174 LBS | SYSTOLIC BLOOD PRESSURE: 110 MMHG

## 2023-05-10 DIAGNOSIS — Z98.61 HISTORY OF PTCA: ICD-10-CM

## 2023-05-10 DIAGNOSIS — I10 ESSENTIAL HYPERTENSION: ICD-10-CM

## 2023-05-10 DIAGNOSIS — Z95.0 PACEMAKER: ICD-10-CM

## 2023-05-10 DIAGNOSIS — I48.0 PAROXYSMAL ATRIAL FIBRILLATION (HCC): ICD-10-CM

## 2023-05-10 DIAGNOSIS — I50.32 CHRONIC DIASTOLIC CONGESTIVE HEART FAILURE (HCC): ICD-10-CM

## 2023-05-10 DIAGNOSIS — I49.5 SSS (SICK SINUS SYNDROME) (HCC): ICD-10-CM

## 2023-05-10 DIAGNOSIS — I35.0 NONRHEUMATIC AORTIC VALVE STENOSIS: ICD-10-CM

## 2023-05-10 DIAGNOSIS — I25.10 CAD IN NATIVE ARTERY: Primary | ICD-10-CM

## 2023-05-10 PROCEDURE — 3074F SYST BP LT 130 MM HG: CPT | Performed by: INTERNAL MEDICINE

## 2023-05-10 PROCEDURE — G8427 DOCREV CUR MEDS BY ELIG CLIN: HCPCS | Performed by: INTERNAL MEDICINE

## 2023-05-10 PROCEDURE — 99214 OFFICE O/P EST MOD 30 MIN: CPT | Performed by: INTERNAL MEDICINE

## 2023-05-10 PROCEDURE — 1036F TOBACCO NON-USER: CPT | Performed by: INTERNAL MEDICINE

## 2023-05-10 PROCEDURE — G8417 CALC BMI ABV UP PARAM F/U: HCPCS | Performed by: INTERNAL MEDICINE

## 2023-05-10 PROCEDURE — 3078F DIAST BP <80 MM HG: CPT | Performed by: INTERNAL MEDICINE

## 2023-05-10 PROCEDURE — 1123F ACP DISCUSS/DSCN MKR DOCD: CPT | Performed by: INTERNAL MEDICINE

## 2023-05-10 ASSESSMENT — ENCOUNTER SYMPTOMS
COUGH: 0
APNEA: 0
SHORTNESS OF BREATH: 0
CHEST TIGHTNESS: 0
ABDOMINAL DISTENTION: 0
CHOKING: 0

## 2023-05-10 NOTE — PROGRESS NOTES
Subjective:      Patient ID: Tuan Anaya is a 76 y.o. male. Here for follow up afib/sss/pacer/htn/cad/PTCA/CHF. No s/p dofetilide loading. Feeling good. Exercising without problem. Works out with  2x / wk. No pnd or orthopnea. No edema. Wt up. BP good at home. No chest pain. No sob. Rhythm good. No syncope. Past Medical History:   Diagnosis Date    Aortic stenosis 2015 Feb 2019 mean gradient 35mg Hg    Atrial fibrillation ([facility] [address] 07/2012    Saw cardiologist for palps. Event recorder showed AF about 5% of the time. Warfarin started. Became persistent in March 2019 when admitted with pneumonia. CAD (coronary artery disease), native coronary artery Dec 18, 2007    Presented with palpitation to ER. Slight inc. in S. troponin. Cor. angio showed 90% L. circ. Stent placed. (Dr Lisa Morton. Diabetes mellitus ([facility] [address] 1992    HbA1C in Jennifer '15 was 6.9 on metformin and glipizide. Diastolic congestive heart failure ([facility] [address] 08/2006    presented with wheeze, [id number]. cardiac cath to r/o ischemia. Diffuse CAD no critical stenoses. LVEF 55% LVEDP 31. Essential hypertension since 1980    Difficult to control with need for minoxidil in addition to ARB,CCB,BB and diuretic. Gout     Podagra intermittently over the years. Uric acid 9.4 in 2011. Allopurinol started (with colchicine for 3 months)    Hypothyroid March 2011    Total thyroidectomy for indeterminate nodule. Proved benign. Left carotid bruit Oct 2014    Discovered at routine exam. Carotid Doppler showed bilateral stenosis <50%    Obstructive sleep apnea 1997    as of 2019 still using CPAP    Peripheral neuropathy     2/2 DM. Pneumonia 07/1058    complicated by hypotension and overdiuresis leading to REYNALDO. S.Cr 1.7 on 3/9/19. Improved to 1.3 on 3/11/19    Sick sinus syndrome (HCC) 02/16/2017    PIP placed because of 7 sec pauses on sotalol.      Past Surgical History:   Procedure Laterality Date    CHOLECYSTECTOMY, OPEN  1993

## 2023-06-20 ENCOUNTER — NURSE ONLY (OUTPATIENT)
Dept: CARDIOLOGY CLINIC | Age: 78
End: 2023-06-20
Payer: MEDICARE

## 2023-06-20 DIAGNOSIS — R00.1 BRADYCARDIA: ICD-10-CM

## 2023-06-20 DIAGNOSIS — Z95.0 CARDIAC PACEMAKER IN SITU: Primary | ICD-10-CM

## 2023-06-20 PROCEDURE — 93294 REM INTERROG EVL PM/LDLS PM: CPT | Performed by: INTERNAL MEDICINE

## 2023-06-20 PROCEDURE — 93296 REM INTERROG EVL PM/IDS: CPT | Performed by: INTERNAL MEDICINE

## 2023-07-12 NOTE — PROGRESS NOTES
Remote transmission received from patient's dual chamber pacemaker monitor at home. Transmission shows normal sensing and pacing function. Noted AT/AF/L, 2.3% burden (Tikosyn, Xarelto, Lopressor). Ap 95.8%   0.2% (MVP On)  PVCs 1.0 p/hr    End of 91-day monitoring period 6/20/23. EP physician will review. See interrogation under cardiology tab in the [address] field for more details.

## 2023-08-11 ENCOUNTER — OFFICE VISIT (OUTPATIENT)
Dept: CARDIOLOGY CLINIC | Age: 78
End: 2023-08-11

## 2023-08-11 VITALS
WEIGHT: 174 LBS | DIASTOLIC BLOOD PRESSURE: 60 MMHG | BODY MASS INDEX: 26.46 KG/M2 | SYSTOLIC BLOOD PRESSURE: 110 MMHG | HEART RATE: 72 BPM

## 2023-08-11 DIAGNOSIS — Z98.61 HISTORY OF PTCA: ICD-10-CM

## 2023-08-11 DIAGNOSIS — I48.0 PAROXYSMAL ATRIAL FIBRILLATION (HCC): ICD-10-CM

## 2023-08-11 DIAGNOSIS — I49.5 SSS (SICK SINUS SYNDROME) (HCC): ICD-10-CM

## 2023-08-11 DIAGNOSIS — I35.0 NONRHEUMATIC AORTIC VALVE STENOSIS: ICD-10-CM

## 2023-08-11 DIAGNOSIS — I10 ESSENTIAL HYPERTENSION: ICD-10-CM

## 2023-08-11 DIAGNOSIS — Z95.0 PACEMAKER: ICD-10-CM

## 2023-08-11 DIAGNOSIS — I25.10 CAD IN NATIVE ARTERY: Primary | ICD-10-CM

## 2023-08-11 DIAGNOSIS — I50.32 CHRONIC DIASTOLIC CONGESTIVE HEART FAILURE (HCC): ICD-10-CM

## 2023-08-11 ASSESSMENT — ENCOUNTER SYMPTOMS
CHEST TIGHTNESS: 0
APNEA: 0
CHOKING: 0
SHORTNESS OF BREATH: 0
ABDOMINAL DISTENTION: 0
COUGH: 0

## 2023-08-11 NOTE — PROGRESS NOTES
Subjective:      Patient ID: Tono Pendleton is a 78 y.o. male. Here for follow up afib/sss/pacer/htn/cad/PTCA/CHF. No s/p dofetilide loading. Feeling good. Exercising without problem. Works out with  2x / wk. No pnd or orthopnea. No edema. Wt up. BP good at home. No chest pain. No sob. Rhythm good. No syncope. Past Medical History:   Diagnosis Date    Aortic stenosis 2015 Feb 2019 mean gradient 35mg Hg    Atrial fibrillation ([address]) 07/2012    Saw cardiologist for palps. Event recorder showed AF about 5% of the time. Warfarin started. Became persistent in March 2019 when admitted with pneumonia. CAD (coronary artery disease), native coronary artery Dec 18, 2007    Presented with palpitation to ER. Slight inc. in S. troponin. Cor. angio showed 90% L. circ. Stent placed. (Dr Marrero [place]. Diabetes mellitus ([address]) 1992    HbA1C in Jennifer '15 was 6.9 on metformin and glipizide. Diastolic congestive heart failure ([address]) 08/2006    presented with wheeze, [id number]. cardiac cath to r/o ischemia. Diffuse CAD no critical stenoses. LVEF 55% LVEDP 31. Essential hypertension since 1980    Difficult to control with need for minoxidil in addition to ARB,CCB,BB and diuretic. Gout     Podagra intermittently over the years. Uric acid 9.4 in 2011. Allopurinol started (with colchicine for 3 months)    Hypothyroid March 2011    Total thyroidectomy for indeterminate nodule. Proved benign. Left carotid bruit Oct 2014    Discovered at routine exam. Carotid Doppler showed bilateral stenosis <50%    Obstructive sleep apnea 1997    as of 2019 still using CPAP    Peripheral neuropathy     2/2 DM. Pneumonia 07/7008    complicated by hypotension and overdiuresis leading to REYNALDO. S.Cr 1.7 on 3/9/19. Improved to 1.3 on 3/11/19    Sick sinus syndrome (HCC) 02/16/2017    PIP placed because of 7 sec pauses on sotalol.      Past Surgical History:   Procedure Laterality Date    CHOLECYSTECTOMY, OPEN  1993

## 2023-09-20 PROCEDURE — 93296 REM INTERROG EVL PM/IDS: CPT | Performed by: INTERNAL MEDICINE

## 2023-09-20 PROCEDURE — 93294 REM INTERROG EVL PM/LDLS PM: CPT | Performed by: INTERNAL MEDICINE

## 2023-09-26 NOTE — PROGRESS NOTES
tenderness. Musculoskeletal: No tenderness. No edema    Lymphadenopathy: Has no cervical adenopathy. Neurological: Alert and oriented. Cranial nerve appears intact, No Gross deficit   Skin: Skin is warm and dry. No rash noted. Left chest incision has healed well. Psychiatric: Has a normal mood, affect and behavior       Labs:  Reviewed. Lab Results   Component Value Date/Time    CKTOTAL 97 02/04/2013 10:19 AM    TROPONINI <0.01 10/11/2021 12:08 AM     Lab Results   Component Value Date/Time    .0 11/30/2015 03:04 PM     Lab Results   Component Value Date/Time    PROTIME 18.1 02/08/2023 04:57 AM    PROTIME 19.5 02/07/2023 05:12 AM    PROTIME 18.9 02/06/2023 08:20 AM    INR 1.50 02/08/2023 04:57 AM    INR 1.65 02/07/2023 05:12 AM    INR 1.58 02/06/2023 08:20 AM     Lab Results   Component Value Date/Time    CHOL 114 04/21/2023 11:48 AM    HDL 57 04/21/2023 11:48 AM    HDL 51 09/13/2011 10:53 AM    TRIG 64 04/21/2023 11:48 AM       ECG: Personally reviewed: Paced, V sensed, HR 73, , QRS 94, QTc 454    ECHO: 2/7/2023  Summary  Left ventricular cavity size is normal. There is mild concentric left ventricular hypertrophy. Left ventricular function is normal with ejection fraction estimated at 60-65%. No regional wall motion abnormalities are noted. The aortic valve is thickened/calcified with decreased leaflet mobility. Moderate aortic stenosis with a peak velocity of 3.9m/s and a mean pressure gradient of 34mmHg. Mild to moderate aortic regurgitation. Mild mitral regurgitation is present. Mild to moderate tricuspid regurgitation. Systolic pulmonary artery pressure (SPAP) estimated at 48 mmHg (RA pressure 8 mmHg), consistent with pulmonary hypertension. 2/29/22  Summary  Left ventricular cavity size is normal. There is mild concentric left ventricular hypertrophy. Left ventricular function is normal with ejection fraction estimated at 60-65%. No regional wall motion abnormalities are noted.

## 2023-10-11 ENCOUNTER — OFFICE VISIT (OUTPATIENT)
Dept: CARDIOLOGY CLINIC | Age: 78
End: 2023-10-11
Payer: MEDICARE

## 2023-10-11 ENCOUNTER — NURSE ONLY (OUTPATIENT)
Dept: CARDIOLOGY CLINIC | Age: 78
End: 2023-10-11

## 2023-10-11 VITALS
BODY MASS INDEX: 26.4 KG/M2 | HEART RATE: 74 BPM | WEIGHT: 173.6 LBS | DIASTOLIC BLOOD PRESSURE: 86 MMHG | SYSTOLIC BLOOD PRESSURE: 128 MMHG

## 2023-10-11 DIAGNOSIS — I25.10 CORONARY ARTERY DISEASE INVOLVING NATIVE CORONARY ARTERY OF NATIVE HEART WITHOUT ANGINA PECTORIS: ICD-10-CM

## 2023-10-11 DIAGNOSIS — I48.0 PAF (PAROXYSMAL ATRIAL FIBRILLATION) (HCC): ICD-10-CM

## 2023-10-11 DIAGNOSIS — I49.5 SSS (SICK SINUS SYNDROME) (HCC): ICD-10-CM

## 2023-10-11 DIAGNOSIS — I10 ESSENTIAL HYPERTENSION: ICD-10-CM

## 2023-10-11 DIAGNOSIS — Z95.0 CARDIAC PACEMAKER IN SITU: Primary | ICD-10-CM

## 2023-10-11 DIAGNOSIS — Z51.81 ENCOUNTER FOR MONITORING DOFETILIDE THERAPY: ICD-10-CM

## 2023-10-11 DIAGNOSIS — R00.1 BRADYCARDIA: ICD-10-CM

## 2023-10-11 DIAGNOSIS — I49.5 SICK SINUS SYNDROME (HCC): ICD-10-CM

## 2023-10-11 DIAGNOSIS — I50.32 CHRONIC DIASTOLIC (CONGESTIVE) HEART FAILURE (HCC): ICD-10-CM

## 2023-10-11 DIAGNOSIS — I48.0 PAROXYSMAL ATRIAL FIBRILLATION (HCC): Primary | ICD-10-CM

## 2023-10-11 DIAGNOSIS — Z79.01 ON CONTINUOUS ORAL ANTICOAGULATION: ICD-10-CM

## 2023-10-11 DIAGNOSIS — I48.0 PAROXYSMAL ATRIAL FIBRILLATION (HCC): ICD-10-CM

## 2023-10-11 DIAGNOSIS — Z95.0 PACEMAKER: ICD-10-CM

## 2023-10-11 DIAGNOSIS — Z79.899 ENCOUNTER FOR MONITORING DOFETILIDE THERAPY: ICD-10-CM

## 2023-10-11 LAB
ANION GAP SERPL CALCULATED.3IONS-SCNC: 9 MMOL/L (ref 3–16)
BUN SERPL-MCNC: 40 MG/DL (ref 7–20)
CALCIUM SERPL-MCNC: 9.1 MG/DL (ref 8.3–10.6)
CHLORIDE SERPL-SCNC: 101 MMOL/L (ref 99–110)
CO2 SERPL-SCNC: 28 MMOL/L (ref 21–32)
CREAT SERPL-MCNC: 1.2 MG/DL (ref 0.8–1.3)
GFR SERPLBLD CREATININE-BSD FMLA CKD-EPI: >60 ML/MIN/{1.73_M2}
GLUCOSE SERPL-MCNC: 194 MG/DL (ref 70–99)
MAGNESIUM SERPL-MCNC: 2.6 MG/DL (ref 1.8–2.4)
POTASSIUM SERPL-SCNC: 4.9 MMOL/L (ref 3.5–5.1)
SODIUM SERPL-SCNC: 138 MMOL/L (ref 136–145)

## 2023-10-11 PROCEDURE — 3074F SYST BP LT 130 MM HG: CPT | Performed by: NURSE PRACTITIONER

## 2023-10-11 PROCEDURE — 93000 ELECTROCARDIOGRAM COMPLETE: CPT | Performed by: NURSE PRACTITIONER

## 2023-10-11 PROCEDURE — 3079F DIAST BP 80-89 MM HG: CPT | Performed by: NURSE PRACTITIONER

## 2023-10-11 PROCEDURE — G8417 CALC BMI ABV UP PARAM F/U: HCPCS | Performed by: NURSE PRACTITIONER

## 2023-10-11 PROCEDURE — 1123F ACP DISCUSS/DSCN MKR DOCD: CPT | Performed by: NURSE PRACTITIONER

## 2023-10-11 PROCEDURE — G8428 CUR MEDS NOT DOCUMENT: HCPCS | Performed by: NURSE PRACTITIONER

## 2023-10-11 PROCEDURE — G8484 FLU IMMUNIZE NO ADMIN: HCPCS | Performed by: NURSE PRACTITIONER

## 2023-10-11 PROCEDURE — 1036F TOBACCO NON-USER: CPT | Performed by: NURSE PRACTITIONER

## 2023-10-11 RX ORDER — OXYBUTYNIN CHLORIDE 5 MG/1
5 TABLET, EXTENDED RELEASE ORAL DAILY PRN
Qty: 30 TABLET | Refills: 3
Start: 2023-10-11

## 2023-10-12 RX ORDER — MAGNESIUM OXIDE 400 MG/1
400 TABLET ORAL DAILY
Qty: 90 TABLET | Refills: 3 | Status: SHIPPED | OUTPATIENT
Start: 2023-10-12

## 2023-11-07 RX ORDER — METOPROLOL TARTRATE 50 MG/1
50 TABLET, FILM COATED ORAL 2 TIMES DAILY
Qty: 180 TABLET | Refills: 3 | Status: SHIPPED | OUTPATIENT
Start: 2023-11-07

## 2023-11-07 NOTE — TELEPHONE ENCOUNTER
Requested Prescriptions     Pending Prescriptions Disp Refills    metoprolol tartrate (LOPRESSOR) 50 MG tablet [Pharmacy Med Name: METOPROLOL TARTRATE 50 MG T 50 Tablet] 180 tablet 3     Sig: TAKE 1 TABLET BY MOUTH 2 TIMES DAILY            Last Office Visit: 10/11/2023     Next Office Visit: 11/10/2023         Last Labs: 07.08.8417

## 2023-11-10 ENCOUNTER — OFFICE VISIT (OUTPATIENT)
Dept: CARDIOLOGY CLINIC | Age: 78
End: 2023-11-10
Payer: MEDICARE

## 2023-11-10 VITALS
DIASTOLIC BLOOD PRESSURE: 80 MMHG | HEART RATE: 76 BPM | SYSTOLIC BLOOD PRESSURE: 120 MMHG | WEIGHT: 175 LBS | BODY MASS INDEX: 26.61 KG/M2

## 2023-11-10 DIAGNOSIS — Z95.0 PACEMAKER: ICD-10-CM

## 2023-11-10 DIAGNOSIS — I50.32 CHRONIC DIASTOLIC CONGESTIVE HEART FAILURE (HCC): ICD-10-CM

## 2023-11-10 DIAGNOSIS — I25.10 CAD IN NATIVE ARTERY: Primary | ICD-10-CM

## 2023-11-10 DIAGNOSIS — I48.0 PAROXYSMAL ATRIAL FIBRILLATION (HCC): ICD-10-CM

## 2023-11-10 DIAGNOSIS — I49.5 SSS (SICK SINUS SYNDROME) (HCC): ICD-10-CM

## 2023-11-10 DIAGNOSIS — Z98.61 HISTORY OF PTCA: ICD-10-CM

## 2023-11-10 DIAGNOSIS — I35.0 NONRHEUMATIC AORTIC VALVE STENOSIS: ICD-10-CM

## 2023-11-10 PROCEDURE — 1123F ACP DISCUSS/DSCN MKR DOCD: CPT | Performed by: INTERNAL MEDICINE

## 2023-11-10 PROCEDURE — 3079F DIAST BP 80-89 MM HG: CPT | Performed by: INTERNAL MEDICINE

## 2023-11-10 PROCEDURE — G8417 CALC BMI ABV UP PARAM F/U: HCPCS | Performed by: INTERNAL MEDICINE

## 2023-11-10 PROCEDURE — 99214 OFFICE O/P EST MOD 30 MIN: CPT | Performed by: INTERNAL MEDICINE

## 2023-11-10 PROCEDURE — G8484 FLU IMMUNIZE NO ADMIN: HCPCS | Performed by: INTERNAL MEDICINE

## 2023-11-10 PROCEDURE — G8427 DOCREV CUR MEDS BY ELIG CLIN: HCPCS | Performed by: INTERNAL MEDICINE

## 2023-11-10 PROCEDURE — 1036F TOBACCO NON-USER: CPT | Performed by: INTERNAL MEDICINE

## 2023-11-10 PROCEDURE — 3074F SYST BP LT 130 MM HG: CPT | Performed by: INTERNAL MEDICINE

## 2023-11-10 ASSESSMENT — ENCOUNTER SYMPTOMS
CHOKING: 0
ABDOMINAL DISTENTION: 0
COUGH: 0
APNEA: 0
SHORTNESS OF BREATH: 0
CHEST TIGHTNESS: 0

## 2023-11-10 NOTE — PROGRESS NOTES
Subjective:      Patient ID: Cj Alvarado is a 78 y.o. male. Here for follow up afib/sss/pacer/htn/cad/PTCA/CHF. No s/p dofetilide loading. Feeling good. Exercising without problem. Works out with  2x / wk. No pnd or orthopnea. No edema. Wt up. BP good at home. No chest pain. No sob. Rhythm good. No syncope. Past Medical History:   Diagnosis Date    Aortic stenosis 2015 Feb 2019 mean gradient 35mg Hg    Atrial fibrillation ([address]) 07/2012    Saw cardiologist for palps. Event recorder showed AF about 5% of the time. Warfarin started. Became persistent in March 2019 when admitted with pneumonia. CAD (coronary artery disease), native coronary artery Dec 18, 2007    Presented with palpitation to ER. Slight inc. in S. troponin. Cor. angio showed 90% L. circ. Stent placed. (Dr Christine Grant. Diabetes mellitus ([address]) 1992    HbA1C in Jennifer '15 was 6.9 on metformin and glipizide. Diastolic congestive heart failure ([address]) 08/2006    presented with wheeze, [id number]. cardiac cath to r/o ischemia. Diffuse CAD no critical stenoses. LVEF 55% LVEDP 31. Essential hypertension since 1980    Difficult to control with need for minoxidil in addition to ARB,CCB,BB and diuretic. Gout     Podagra intermittently over the years. Uric acid 9.4 in 2011. Allopurinol started (with colchicine for 3 months)    Hypothyroid March 2011    Total thyroidectomy for indeterminate nodule. Proved benign. Left carotid bruit Oct 2014    Discovered at routine exam. Carotid Doppler showed bilateral stenosis <50%    Obstructive sleep apnea 1997    as of 2019 still using CPAP    Peripheral neuropathy     2/2 DM. Pneumonia 25/4894    complicated by hypotension and overdiuresis leading to REYNALDO. S.Cr 1.7 on 3/9/19. Improved to 1.3 on 3/11/19    Sick sinus syndrome (HCC) 02/16/2017    PIP placed because of 7 sec pauses on sotalol.      Past Surgical History:   Procedure Laterality Date    CHOLECYSTECTOMY, OPEN  1993

## 2023-12-21 PROCEDURE — 93294 REM INTERROG EVL PM/LDLS PM: CPT | Performed by: INTERNAL MEDICINE

## 2023-12-21 PROCEDURE — 93296 REM INTERROG EVL PM/IDS: CPT | Performed by: INTERNAL MEDICINE

## 2024-02-05 RX ORDER — FUROSEMIDE 20 MG/1
TABLET ORAL
Qty: 270 TABLET | Refills: 1 | Status: SHIPPED | OUTPATIENT
Start: 2024-02-05

## 2024-02-06 NOTE — TELEPHONE ENCOUNTER
Requested Prescriptions     Pending Prescriptions Disp Refills    rivaroxaban (XARELTO) 20 MG TABS tablet 90 tablet 3     Sig: Take 1 tablet by mouth daily (with breakfast)            Last Office Visit: 11/10/2023     Next Office Visit: 2/9/2024         Last Labs: 10.11.2023

## 2024-02-09 ENCOUNTER — OFFICE VISIT (OUTPATIENT)
Dept: CARDIOLOGY CLINIC | Age: 79
End: 2024-02-09
Payer: MEDICARE

## 2024-02-09 ENCOUNTER — TELEPHONE (OUTPATIENT)
Dept: CARDIOLOGY CLINIC | Age: 79
End: 2024-02-09

## 2024-02-09 VITALS
HEART RATE: 72 BPM | SYSTOLIC BLOOD PRESSURE: 130 MMHG | DIASTOLIC BLOOD PRESSURE: 80 MMHG | BODY MASS INDEX: 26.3 KG/M2 | WEIGHT: 173 LBS

## 2024-02-09 DIAGNOSIS — I35.0 NONRHEUMATIC AORTIC VALVE STENOSIS: ICD-10-CM

## 2024-02-09 DIAGNOSIS — I50.32 CHRONIC DIASTOLIC CONGESTIVE HEART FAILURE (HCC): ICD-10-CM

## 2024-02-09 DIAGNOSIS — Z98.61 CORONARY ARTERIOSCLEROSIS AFTER PERCUTANEOUS TRANSLUMINAL CORONARY ANGIOPLASTY (PTCA): ICD-10-CM

## 2024-02-09 DIAGNOSIS — Z95.0 PACEMAKER: ICD-10-CM

## 2024-02-09 DIAGNOSIS — I25.10 CAD IN NATIVE ARTERY: Primary | ICD-10-CM

## 2024-02-09 DIAGNOSIS — I48.0 PAROXYSMAL ATRIAL FIBRILLATION (HCC): ICD-10-CM

## 2024-02-09 DIAGNOSIS — I49.5 SSS (SICK SINUS SYNDROME) (HCC): ICD-10-CM

## 2024-02-09 DIAGNOSIS — I25.10 CORONARY ARTERIOSCLEROSIS AFTER PERCUTANEOUS TRANSLUMINAL CORONARY ANGIOPLASTY (PTCA): ICD-10-CM

## 2024-02-09 PROCEDURE — 1036F TOBACCO NON-USER: CPT | Performed by: INTERNAL MEDICINE

## 2024-02-09 PROCEDURE — 99214 OFFICE O/P EST MOD 30 MIN: CPT | Performed by: INTERNAL MEDICINE

## 2024-02-09 PROCEDURE — G8484 FLU IMMUNIZE NO ADMIN: HCPCS | Performed by: INTERNAL MEDICINE

## 2024-02-09 PROCEDURE — 3075F SYST BP GE 130 - 139MM HG: CPT | Performed by: INTERNAL MEDICINE

## 2024-02-09 PROCEDURE — G8417 CALC BMI ABV UP PARAM F/U: HCPCS | Performed by: INTERNAL MEDICINE

## 2024-02-09 PROCEDURE — G8427 DOCREV CUR MEDS BY ELIG CLIN: HCPCS | Performed by: INTERNAL MEDICINE

## 2024-02-09 PROCEDURE — 3079F DIAST BP 80-89 MM HG: CPT | Performed by: INTERNAL MEDICINE

## 2024-02-09 PROCEDURE — 1123F ACP DISCUSS/DSCN MKR DOCD: CPT | Performed by: INTERNAL MEDICINE

## 2024-02-09 ASSESSMENT — ENCOUNTER SYMPTOMS
COUGH: 0
ABDOMINAL DISTENTION: 0
CHOKING: 0
APNEA: 0
CHEST TIGHTNESS: 0
SHORTNESS OF BREATH: 0

## 2024-02-09 NOTE — TELEPHONE ENCOUNTER
Spoke with pt informed him that he is ok to continue on as before. No changes needed at this time.

## 2024-02-09 NOTE — PROGRESS NOTES
Laterality Date    CHOLECYSTECTOMY, OPEN  1993    CORONARY ANGIOPLASTY  12/17/07    has stent per pt    DIAGNOSTIC CARDIAC CATH LAB PROCEDURE  12/17/07    PACEMAKER INSERTION  02/2017    Sick sinus sundrome with 7 sec pauses on sotalol.    THYROIDECTOMY  2011    Benign    TOTAL KNEE ARTHROPLASTY Right 3/12/14     Social History     Socioeconomic History    Marital status: Single     Spouse name: Not on file    Number of children: Not on file    Years of education: Not on file    Highest education level: Not on file   Occupational History    Not on file   Tobacco Use    Smoking status: Never    Smokeless tobacco: Never   Substance and Sexual Activity    Alcohol use: No    Drug use: No    Sexual activity: Not Currently     Comment: Single   Other Topics Concern    Not on file   Social History Narrative    Not on file     Social Determinants of Health     Financial Resource Strain: Not on file   Food Insecurity: Not on file   Transportation Needs: Not on file   Physical Activity: Not on file   Stress: Not on file   Social Connections: Not on file   Intimate Partner Violence: Not on file   Housing Stability: Not on file       FH reviewed, denies FH cardiac issues.        Vitals:    02/09/24 0947   BP: 130/80   Pulse: 72           Wt 173      Review of Systems   Constitutional:  Negative for activity change and fatigue.   Respiratory:  Negative for apnea, cough, choking, chest tightness and shortness of breath.    Cardiovascular:  Negative for chest pain, palpitations and leg swelling.        No PND or orthopnea.  No tachycardia.   Gastrointestinal:  Negative for abdominal distention.   Musculoskeletal:  Negative for myalgias.   Neurological:  Negative for dizziness, syncope and light-headedness.   Psychiatric/Behavioral:  Negative for agitation, behavioral problems and confusion.    All other systems reviewed negative as done.        Objective:   Physical Exam  Constitutional:       General: He is not in acute

## 2024-02-09 NOTE — TELEPHONE ENCOUNTER
Spoke with Pt he states that he received a letter about monitoring his pacemaker at home. It stated that he no longer needs to use the hand held device any longer is that true? He also states that he uses a cpap that is held on with two magnets. An email from the cpap supplies stated that he needs to get something in place of the magnets so that it doesn't affect the pacemaker monitor. He is  not sure if he should be concerned or if he can continue what he's been doing. Please contact pt at number listed.

## 2024-03-06 ENCOUNTER — HOSPITAL ENCOUNTER (OUTPATIENT)
Dept: NON INVASIVE DIAGNOSTICS | Age: 79
Discharge: HOME | End: 2024-03-06
Attending: INTERNAL MEDICINE
Payer: MEDICARE

## 2024-03-06 DIAGNOSIS — I35.0 NONRHEUMATIC AORTIC VALVE STENOSIS: ICD-10-CM

## 2024-03-06 DIAGNOSIS — I49.5 SSS (SICK SINUS SYNDROME) (HCC): ICD-10-CM

## 2024-03-06 DIAGNOSIS — Z98.61 CORONARY ARTERIOSCLEROSIS AFTER PERCUTANEOUS TRANSLUMINAL CORONARY ANGIOPLASTY (PTCA): ICD-10-CM

## 2024-03-06 DIAGNOSIS — I50.32 CHRONIC DIASTOLIC CONGESTIVE HEART FAILURE (HCC): ICD-10-CM

## 2024-03-06 DIAGNOSIS — Z95.0 PACEMAKER: ICD-10-CM

## 2024-03-06 DIAGNOSIS — I25.10 CAD IN NATIVE ARTERY: ICD-10-CM

## 2024-03-06 DIAGNOSIS — I25.10 CORONARY ARTERIOSCLEROSIS AFTER PERCUTANEOUS TRANSLUMINAL CORONARY ANGIOPLASTY (PTCA): ICD-10-CM

## 2024-03-06 DIAGNOSIS — I48.0 PAROXYSMAL ATRIAL FIBRILLATION (HCC): ICD-10-CM

## 2024-03-06 PROCEDURE — 93306 TTE W/DOPPLER COMPLETE: CPT

## 2024-03-06 PROCEDURE — 76376 3D RENDER W/INTRP POSTPROCES: CPT

## 2024-03-21 PROCEDURE — 93296 REM INTERROG EVL PM/IDS: CPT | Performed by: INTERNAL MEDICINE

## 2024-03-21 PROCEDURE — 93294 REM INTERROG EVL PM/LDLS PM: CPT | Performed by: INTERNAL MEDICINE

## 2024-04-24 NOTE — PROGRESS NOTES
Atrial fibrillation (HCC) 07/2012    Saw cardiologist for palps. Event recorder showed AF about 5% of the time. Warfarin started. Became persistent in March 2019 when admitted with pneumonia.    CAD (coronary artery disease), native coronary artery Dec 18, 2007    Presented with palpitation to ER. Slight inc. in S. troponin. Cor. angio showed 90% L. circ. Stent placed. (Dr Cameron.    Diabetes mellitus ([facility]) 1992    HbA1C in Jennifer '15 was 6.9 on metformin and glipizide.     Diastolic congestive heart failure (HCC) 08/2006    presented with wheeze, [id number]. cardiac cath to r/o ischemia. Diffuse CAD no critical stenoses. LVEF 55% LVEDP 31.    Essential hypertension since 1980    Difficult to control with need for minoxidil in addition to ARB,CCB,BB and diuretic.    Gout     Podagra intermittently over the years. Uric acid 9.4 in 2011. Allopurinol started (with colchicine for 3 months)    Hypothyroid March 2011    Total thyroidectomy for indeterminate nodule. Proved benign.    Left carotid bruit Oct 2014    Discovered at routine exam. Carotid Doppler showed bilateral stenosis <50%    Obstructive sleep apnea 1997    as of 2019 still using CPAP    Peripheral neuropathy     2/2 DM.    Pneumonia 03/2019    complicated by hypotension and overdiuresis leading to REYNALDO. S.Cr 1.7 on 3/9/19. Improved to 1.3 on 3/11/19    Sick sinus syndrome (HCC) 02/16/2017    PIP placed because of 7 sec pauses on sotalol.        Past Surgical History:   Procedure Laterality Date    CHOLECYSTECTOMY, OPEN  1993    CORONARY ANGIOPLASTY  12/17/07    has stent per pt    DIAGNOSTIC CARDIAC CATH LAB PROCEDURE  12/17/07    PACEMAKER INSERTION  02/2017    Sick sinus sundrome with 7 sec pauses on sotalol.    THYROIDECTOMY  2011    Benign    TOTAL KNEE ARTHROPLASTY Right 3/12/14       Allergies:  Allergies   Allergen Reactions    Erythromycin Hives and Swelling     Swelling of hands and feet, all-over hives.     Amlodipine Other (See Comments)     tremors

## 2024-04-27 NOTE — CARE COORDINATION
[facility] Transitions Follow Up Call    2019    Patient: Devera Moritz  Patient : 1945   MRN: [id number]  Reason for Admission:   Discharge Date: 3/9/19 RARS: Readmission Risk Score: 23         Spoke with: [address] Transitions Subsequent and Final Call    Subsequent and Final Calls  Do you have any ongoing symptoms?:  No  Have your medications changed?:  No  Do you currently have any active services?:  No  Do you have any needs or concerns that I can assist you with?:  No  Identified Barriers:  None  Care Transitions Interventions  Other Interventions: Follow Up : Spoke briefly with patient who said he is doing great. Pt was laughing and said unless I had a stack of $50 bills he really didn't need anything. Pt said he is doing everything and out and about and thanked me for doing the call today. Will continue to follow patient status.    Future Appointments   Date Time Provider Conchita Arroyo   2019 10:45 AM MD Dee Carranza Card [facility]   2019 11:00 AM Santi Amato PACERS [place] [facility]   2019  1:30 PM MD Lizett José Card [facility]   2019  3:20 AM Santi Amato PHONE TRANSMISSION [place] [facility]   2019  9:15 AM Sondra Jones [address] [place] [address] Marybeth North RN coughing/seasonal

## 2024-05-02 ENCOUNTER — OFFICE VISIT (OUTPATIENT)
Dept: CARDIOLOGY CLINIC | Age: 79
End: 2024-05-02

## 2024-05-02 ENCOUNTER — NURSE ONLY (OUTPATIENT)
Dept: CARDIOLOGY CLINIC | Age: 79
End: 2024-05-02
Payer: MEDICARE

## 2024-05-02 VITALS
HEART RATE: 64 BPM | SYSTOLIC BLOOD PRESSURE: 120 MMHG | WEIGHT: 171 LBS | DIASTOLIC BLOOD PRESSURE: 74 MMHG | BODY MASS INDEX: 26 KG/M2

## 2024-05-02 DIAGNOSIS — I50.32 CHRONIC DIASTOLIC (CONGESTIVE) HEART FAILURE (HCC): ICD-10-CM

## 2024-05-02 DIAGNOSIS — R00.1 BRADYCARDIA: ICD-10-CM

## 2024-05-02 DIAGNOSIS — I25.10 CAD IN NATIVE ARTERY: ICD-10-CM

## 2024-05-02 DIAGNOSIS — I10 ESSENTIAL HYPERTENSION: ICD-10-CM

## 2024-05-02 DIAGNOSIS — Z95.0 CARDIAC PACEMAKER IN SITU: Primary | ICD-10-CM

## 2024-05-02 DIAGNOSIS — Z95.0 PACEMAKER: ICD-10-CM

## 2024-05-02 DIAGNOSIS — I48.0 PAF (PAROXYSMAL ATRIAL FIBRILLATION) (HCC): Primary | ICD-10-CM

## 2024-05-02 DIAGNOSIS — I49.5 SICK SINUS SYNDROME (HCC): ICD-10-CM

## 2024-05-02 DIAGNOSIS — G47.33 OSA (OBSTRUCTIVE SLEEP APNEA): ICD-10-CM

## 2024-05-02 DIAGNOSIS — I48.0 PAROXYSMAL ATRIAL FIBRILLATION (HCC): ICD-10-CM

## 2024-05-02 DIAGNOSIS — I50.32 CHRONIC DIASTOLIC CONGESTIVE HEART FAILURE (HCC): ICD-10-CM

## 2024-05-02 DIAGNOSIS — I49.5 SSS (SICK SINUS SYNDROME) (HCC): ICD-10-CM

## 2024-05-02 PROCEDURE — 93280 PM DEVICE PROGR EVAL DUAL: CPT | Performed by: INTERNAL MEDICINE

## 2024-05-02 RX ORDER — DOFETILIDE 0.12 MG/1
125 CAPSULE ORAL EVERY 12 HOURS SCHEDULED
Qty: 180 CAPSULE | Refills: 3 | Status: SHIPPED | OUTPATIENT
Start: 2024-05-02

## 2024-05-24 ENCOUNTER — OFFICE VISIT (OUTPATIENT)
Dept: CARDIOLOGY CLINIC | Age: 79
End: 2024-05-24
Payer: MEDICARE

## 2024-05-24 VITALS
SYSTOLIC BLOOD PRESSURE: 114 MMHG | HEART RATE: 88 BPM | DIASTOLIC BLOOD PRESSURE: 62 MMHG | WEIGHT: 172.4 LBS | BODY MASS INDEX: 26.21 KG/M2

## 2024-05-24 DIAGNOSIS — I25.10 CAD IN NATIVE ARTERY: Primary | ICD-10-CM

## 2024-05-24 DIAGNOSIS — I48.0 PAROXYSMAL ATRIAL FIBRILLATION (HCC): ICD-10-CM

## 2024-05-24 DIAGNOSIS — Z98.61 HISTORY OF PTCA: ICD-10-CM

## 2024-05-24 DIAGNOSIS — I50.32 CHRONIC DIASTOLIC CONGESTIVE HEART FAILURE (HCC): ICD-10-CM

## 2024-05-24 DIAGNOSIS — I49.5 SSS (SICK SINUS SYNDROME) (HCC): ICD-10-CM

## 2024-05-24 DIAGNOSIS — Z95.0 PACEMAKER: ICD-10-CM

## 2024-05-24 DIAGNOSIS — I35.0 NONRHEUMATIC AORTIC VALVE STENOSIS: ICD-10-CM

## 2024-05-24 PROCEDURE — 3078F DIAST BP <80 MM HG: CPT | Performed by: INTERNAL MEDICINE

## 2024-05-24 PROCEDURE — G8417 CALC BMI ABV UP PARAM F/U: HCPCS | Performed by: INTERNAL MEDICINE

## 2024-05-24 PROCEDURE — 99214 OFFICE O/P EST MOD 30 MIN: CPT | Performed by: INTERNAL MEDICINE

## 2024-05-24 PROCEDURE — 1123F ACP DISCUSS/DSCN MKR DOCD: CPT | Performed by: INTERNAL MEDICINE

## 2024-05-24 PROCEDURE — G8427 DOCREV CUR MEDS BY ELIG CLIN: HCPCS | Performed by: INTERNAL MEDICINE

## 2024-05-24 PROCEDURE — 1036F TOBACCO NON-USER: CPT | Performed by: INTERNAL MEDICINE

## 2024-05-24 PROCEDURE — 3074F SYST BP LT 130 MM HG: CPT | Performed by: INTERNAL MEDICINE

## 2024-05-24 RX ORDER — SOLIFENACIN SUCCINATE 10 MG/1
10 TABLET, FILM COATED ORAL NIGHTLY
COMMUNITY
Start: 2024-05-15

## 2024-05-24 NOTE — PROGRESS NOTES
acute distress.     Appearance: He is well-developed.   HENT:      Head: Normocephalic and atraumatic.   Eyes:      General:         Right eye: No discharge.         Left eye: No discharge.      Conjunctiva/sclera: Conjunctivae normal.   Neck:      Vascular: No JVD.   Cardiovascular:      Rate and Rhythm: Normal rate and regular rhythm.      Pulses:           Radial pulses are 2+ on the right side and 2+ on the left side.      Heart sounds: S1 normal and S2 normal. Murmur heard.      No gallop.      Comments: 2/6 syst M    Pulmonary:      Effort: Pulmonary effort is normal.      Breath sounds: Normal breath sounds. No wheezing or rales.   Abdominal:      General: Bowel sounds are normal.      Palpations: Abdomen is soft.      Tenderness: There is no abdominal tenderness.   Musculoskeletal:         General: Normal range of motion.      Cervical back: Normal range of motion and neck supple.   Skin:     General: Skin is warm and dry.   Neurological:      Mental Status: He is alert and oriented to person, place, and time.   Psychiatric:         Behavior: Behavior normal.         Thought Content: Thought content normal.         Assessment:       Diagnosis Orders   1. CAD in native artery        2. History of PTCA        3. SSS (sick sinus syndrome) (HCC)        4. Pacemaker        5. Paroxysmal atrial fibrillation (HCC)        6. Nonrheumatic aortic valve stenosis        7. Chronic diastolic congestive heart failure (HCC)                  Plan:   Assessment & Plan   CV stable.  Rhythm stable.  Dofetilide controlling afib well.  Continue.   EP following pacemaker/afib.   Rhythm stable. Exercising 2x per wik with .   Wt stable.  On AC, no bleeding issues. Continue on Xarelto 15 mg qd.  Will continue Aldactone/lasix/jmetoprolol for CHF/HTN/CAD.   Recommended diet, exercise and wt loss.   Reviewed previous records and testing including myoview 7/20 and echo 3/24.  No changes. Continue to monitor. Follow up 3 months.

## 2024-06-20 PROCEDURE — 93296 REM INTERROG EVL PM/IDS: CPT | Performed by: INTERNAL MEDICINE

## 2024-06-20 PROCEDURE — 93294 REM INTERROG EVL PM/LDLS PM: CPT | Performed by: INTERNAL MEDICINE

## 2024-10-24 RX ORDER — METOPROLOL TARTRATE 50 MG
50 TABLET ORAL 2 TIMES DAILY
Qty: 180 TABLET | Refills: 3 | Status: SHIPPED | OUTPATIENT
Start: 2024-10-24

## 2024-10-24 NOTE — TELEPHONE ENCOUNTER
Requested Prescriptions     Pending Prescriptions Disp Refills    metoprolol tartrate (LOPRESSOR) 50 MG tablet 180 tablet 3     Sig: Take 1 tablet by mouth 2 times daily            Checked Correct Pharmacy: Yes    Any changes since last refill? No     Number: 180    Refills: 3    Last Office Visit: 5/24/2024     Next Office Visit: 10/25/2024     Last Refill: 11/07/2023    Last Labs: 05/15/2024

## 2024-10-25 ENCOUNTER — OFFICE VISIT (OUTPATIENT)
Dept: CARDIOLOGY CLINIC | Age: 79
End: 2024-10-25

## 2024-10-25 VITALS
DIASTOLIC BLOOD PRESSURE: 80 MMHG | SYSTOLIC BLOOD PRESSURE: 150 MMHG | WEIGHT: 173.2 LBS | BODY MASS INDEX: 26.33 KG/M2 | HEART RATE: 65 BPM

## 2024-10-25 DIAGNOSIS — I48.0 PAROXYSMAL ATRIAL FIBRILLATION (HCC): ICD-10-CM

## 2024-10-25 DIAGNOSIS — I10 ESSENTIAL HYPERTENSION: ICD-10-CM

## 2024-10-25 DIAGNOSIS — I35.0 NONRHEUMATIC AORTIC VALVE STENOSIS: Primary | ICD-10-CM

## 2024-10-25 RX ORDER — SOLIFENACIN SUCCINATE 5 MG/1
5 TABLET, FILM COATED ORAL DAILY
COMMUNITY

## 2024-10-25 RX ORDER — FUROSEMIDE 20 MG/1
20 TABLET ORAL DAILY
COMMUNITY

## 2024-10-25 NOTE — PROGRESS NOTES
Date    Aortic stenosis 2015 Feb 2019 mean gradient 35mg Hg    Atrial fibrillation (HCC) 07/2012    Saw cardiologist for palps. Event recorder showed AF about 5% of the time. Warfarin started. Became persistent in March 2019 when admitted with pneumonia.    CAD (coronary artery disease), native coronary artery Dec 18, 2007    Presented with palpitation to ER. Slight inc. in S. troponin. Cor. angio showed 90% L. circ. Stent placed. (Dr Cameron.    Diabetes mellitus ([facility]) 1992    HbA1C in Jennifer '15 was 6.9 on metformin and glipizide.     Diastolic congestive heart failure (HCC) 08/2006    presented with wheeze, [id number]. cardiac cath to r/o ischemia. Diffuse CAD no critical stenoses. LVEF 55% LVEDP 31.    Essential hypertension since 1980    Difficult to control with need for minoxidil in addition to ARB,CCB,BB and diuretic.    Gout     Podagra intermittently over the years. Uric acid 9.4 in 2011. Allopurinol started (with colchicine for 3 months)    Hypothyroid March 2011    Total thyroidectomy for indeterminate nodule. Proved benign.    Left carotid bruit Oct 2014    Discovered at routine exam. Carotid Doppler showed bilateral stenosis <50%    Obstructive sleep apnea 1997    as of 2019 still using CPAP    Peripheral neuropathy     2/2 DM.    Pneumonia 03/2019    complicated by hypotension and overdiuresis leading to REYNALDO. S.Cr 1.7 on 3/9/19. Improved to 1.3 on 3/11/19    Sick sinus syndrome (HCC) 02/16/2017    PIP placed because of 7 sec pauses on sotalol.       PSH  Past Surgical History:   Procedure Laterality Date    CHOLECYSTECTOMY, OPEN  1993    CORONARY ANGIOPLASTY  12/17/07    has stent per pt    DIAGNOSTIC CARDIAC CATH LAB PROCEDURE  12/17/07    PACEMAKER INSERTION  02/2017    Sick sinus sundrome with 7 sec pauses on sotalol.    THYROIDECTOMY  2011    Benign    TOTAL KNEE ARTHROPLASTY Right 3/12/14        Social HIstory  Social History     Tobacco Use    Smoking status: Never    Smokeless tobacco: Never

## 2024-10-25 NOTE — PROGRESS NOTES
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 RUIZ, s/s pAF, s/s post conversion pauses while on large dose of fito agents, d/c'd BB, started dilt and low dose sotalol with low AF burden, 2 week MDT monitor (2/2017) w/ >7 sec conversion pause, s/p dual ch MDT PPM (2/2017, Dr. Angel), recurrent AF, sotalol d/c'd, s/p dofetilide loading (2/2023), intermittent AF, echo (3/2024) EF 60-65%, limited echo (11/2024) mild/mod AS, sev LAE, EF 65%.    Interval Hx/HPI: Patient is here for follow-up for paroxysmal AF, SSS and PPM management.  Patient has longstanding history of paroxysmal atrial fibrillation.  He has been symptomatic in the past.  He was placed on large doses of AV fito agents for rate control and developed postconversion pauses.  His beta-blocker was discontinued and he was placed on diltiazem and low-dose sotalol with a low AF burden.  He wore an outpatient monitor that showed a greater than 7-second pause.  He was implanted with a dual-chamber MDT PPM in February 2017.   He presents in a paced, V sensed rhythm.  Review of his device today shows 93.9% AP, 1.2% , 267 AT/AF episodes noted for an overall burden of 5.1%.  His most recent was greater than 11 hours.  His heart rates are not always controlled.  His capture threshold in the RV has risen since the end of September.  His estimated longevity is 2 years on his device, and other parameters are stable.  Patient states that he is unaware of any change.  He has not had any surgical procedure, mammogram or accident involving his upper chest.  He did have an echo in November 2024 and there were some concerns for

## 2024-10-25 NOTE — PATIENT INSTRUCTIONS
Referral placed for Dr Spence.  Their office will reach out to you to schedule your appointment for a TAVR evaluation    Low salt diet    Thank you for choosing [facility] for your cardiac care.    During your visit today, we reviewed and confirmed your cardiac medications along with  medication prescribed by your other healthcare team members. Please be sure to discuss any  changes to medication with your providers.    Please bring a list of ALL medications (or the bottles) with you to EVERY appointment.  Also include vitamins and over-the-counter medications.    If you need refills for any cardiac medications, please call your pharmacy and they will reach out to us electronically.    Did your provider order testing today? If yes, then you will receive your results in three  possible ways. You can receive a 123ContactForm message, a phone call, or letter in the mail. Please  note, if you are an active 123ContactForm user, some of your testing will be available within 1-2 days.    Finally, please know that it is good for your heart to exercise and follow a healthy, low-fat diet  as advised by your physician and health care providers.    If you are experiencing a medical emergency, please call 911 immediately.    It's easy to register for a [employer]ContactForm account if you don't already have one. With a [employer]ContactForm  account you can manage your health record, view test results, schedule appointments and  more.     Dr. Anton's clinical staff can be reached at the following phone number: [phone]    If any cardiac testing is ordered, please contact central scheduling at [phone] to get your test scheduled.

## 2024-10-28 ENCOUNTER — TELEPHONE (OUTPATIENT)
Dept: CARDIOLOGY CLINIC | Age: 79
End: 2024-10-28

## 2024-10-28 NOTE — TELEPHONE ENCOUNTER
----- Message from Dr. Luis Armando Spence MD sent at 10/27/2024  3:06 PM EDT -----  Regarding: RE: Follow-Up Consult  Thank you we will see her ASAP in structural clinic.    Luis Armando  ----- Message -----  From: Lee Anton MD  Sent: 10/25/2024  11:52 AM EDT  To: Vaughn Bennett MD; Luis Armando Spence MD  Subject: Follow-Up Consult

## 2024-10-28 NOTE — TELEPHONE ENCOUNTER
Called and spoke to patient and he would like to come to [place] office if possible. I have him scheduled on 11/15 at 815. He has a repeat echocardiogram scheduled for 11/1.

## 2024-11-01 ENCOUNTER — HOSPITAL ENCOUNTER (OUTPATIENT)
Age: 79
Discharge: HOME | End: 2024-11-03
Attending: INTERNAL MEDICINE
Payer: MEDICARE

## 2024-11-01 VITALS
HEIGHT: 68 IN | DIASTOLIC BLOOD PRESSURE: 73 MMHG | BODY MASS INDEX: 26.22 KG/M2 | SYSTOLIC BLOOD PRESSURE: 155 MMHG | WEIGHT: 173 LBS

## 2024-11-01 DIAGNOSIS — I35.0 NONRHEUMATIC AORTIC VALVE STENOSIS: ICD-10-CM

## 2024-11-01 PROCEDURE — 93308 TTE F-UP OR LMTD: CPT

## 2024-11-02 LAB
ECHO AO ASC DIAM: 4.2 CM
ECHO AO ASCENDING AORTA INDEX: 2.19 CM/M2
ECHO AO ROOT DIAM: 3.5 CM
ECHO AO ROOT INDEX: 1.82 CM/M2
ECHO AR MAX VEL PISA: 3.9 M/S
ECHO AV AREA PEAK VELOCITY: 1 CM2
ECHO AV AREA VTI: 1 CM2
ECHO AV AREA/BSA PEAK VELOCITY: 0.5 CM2/M2
ECHO AV AREA/BSA VTI: 0.5 CM2/M2
ECHO AV MEAN GRADIENT: 18 MMHG
ECHO AV MEAN VELOCITY: 37 M/S
ECHO AV PEAK GRADIENT: 33 MMHG
ECHO AV PEAK VELOCITY: 3.1 M/S
ECHO AV REGURGITANT PHT: 678 MS
ECHO AV VELOCITY RATIO: 0.32
ECHO AV VTI: 71.9 CM
ECHO BSA: 1.94 M2
ECHO EST RA PRESSURE: 3 MMHG
ECHO LA AREA 2C: 35.5 CM2
ECHO LA AREA 4C: 27.4 CM2
ECHO LA DIAMETER INDEX: 2.29 CM/M2
ECHO LA DIAMETER: 4.4 CM
ECHO LA MAJOR AXIS: 6.2 CM
ECHO LA MINOR AXIS: 6.6 CM
ECHO LA TO AORTIC ROOT RATIO: 1.26
ECHO LA VOL BP: 121 ML (ref 18–58)
ECHO LA VOL MOD A2C: 153 ML (ref 18–58)
ECHO LA VOL MOD A4C: 92 ML (ref 18–58)
ECHO LA VOL/BSA BIPLANE: 63 ML/M2 (ref 16–34)
ECHO LA VOLUME INDEX MOD A2C: 80 ML/M2 (ref 16–34)
ECHO LA VOLUME INDEX MOD A4C: 48 ML/M2 (ref 16–34)
ECHO LV E' LATERAL VELOCITY: 8 CM/S
ECHO LV E' SEPTAL VELOCITY: 4.5 CM/S
ECHO LV EDV A2C: 113 ML
ECHO LV EDV A4C: 96 ML
ECHO LV EDV INDEX A4C: 50 ML/M2
ECHO LV EDV NDEX A2C: 59 ML/M2
ECHO LV EF PHYSICIAN: 63 %
ECHO LV EJECTION FRACTION A2C: 66 %
ECHO LV EJECTION FRACTION A4C: 62 %
ECHO LV EJECTION FRACTION BIPLANE: 65 % (ref 55–100)
ECHO LV ESV A2C: 38 ML
ECHO LV ESV A4C: 37 ML
ECHO LV ESV INDEX A2C: 20 ML/M2
ECHO LV ESV INDEX A4C: 19 ML/M2
ECHO LV FRACTIONAL SHORTENING: 38 % (ref 28–44)
ECHO LV INTERNAL DIMENSION DIASTOLE INDEX: 2.34 CM/M2
ECHO LV INTERNAL DIMENSION DIASTOLIC: 4.5 CM (ref 4.2–5.9)
ECHO LV INTERNAL DIMENSION SYSTOLIC INDEX: 1.46 CM/M2
ECHO LV INTERNAL DIMENSION SYSTOLIC: 2.8 CM
ECHO LV IVSD: 1 CM (ref 0.6–1)
ECHO LV MASS 2D: 132.8 G (ref 88–224)
ECHO LV MASS INDEX 2D: 69.2 G/M2 (ref 49–115)
ECHO LV POSTERIOR WALL DIASTOLIC: 0.8 CM (ref 0.6–1)
ECHO LV RELATIVE WALL THICKNESS RATIO: 0.36
ECHO LVOT AREA: 3.1 CM2
ECHO LVOT AV VTI INDEX: 0.33
ECHO LVOT DIAM: 2 CM
ECHO LVOT MEAN GRADIENT: 2 MMHG
ECHO LVOT PEAK GRADIENT: 4 MMHG
ECHO LVOT PEAK VELOCITY: 1 M/S
ECHO LVOT STROKE VOLUME INDEX: 38.8 ML/M2
ECHO LVOT SV: 74.4 ML
ECHO LVOT VTI: 23.7 CM
ECHO MV A VELOCITY: 1.05 M/S
ECHO MV AREA VTI: 1.7 CM2
ECHO MV E DECELERATION TIME (DT): 246 MS
ECHO MV E VELOCITY: 1.01 M/S
ECHO MV E/A RATIO: 0.96
ECHO MV E/E' LATERAL: 12.63
ECHO MV E/E' RATIO (AVERAGED): 17.53
ECHO MV E/E' SEPTAL: 22.44
ECHO MV LVOT VTI INDEX: 1.84
ECHO MV MAX VELOCITY: 1 M/S
ECHO MV MEAN GRADIENT: 2 MMHG
ECHO MV MEAN VELOCITY: 0.7 M/S
ECHO MV PEAK GRADIENT: 4 MMHG
ECHO MV VTI: 43.5 CM
ECHO PV ACCELERATION TIME (AT): 121 MS
ECHO PV MAX VELOCITY: 1.1 M/S
ECHO PV PEAK GRADIENT: 5 MMHG
ECHO RA AREA 4C: 21.3 CM2
ECHO RA END SYSTOLIC VOLUME APICAL 4 CHAMBER INDEX BSA: 36 ML/M2
ECHO RA VOLUME: 69 ML
ECHO RIGHT VENTRICULAR SYSTOLIC PRESSURE (RVSP): 23 MMHG
ECHO RV BASAL DIMENSION: 4.1 CM
ECHO RV FREE WALL PEAK S': 10.7 CM/S
ECHO RV MID DIMENSION: 3 CM
ECHO RV TAPSE: 1.9 CM (ref 1.7–?)
ECHO TV REGURGITANT MAX VELOCITY: 2.22 M/S
ECHO TV REGURGITANT PEAK GRADIENT: 20 MMHG

## 2024-11-11 ENCOUNTER — NURSE ONLY (OUTPATIENT)
Dept: CARDIOLOGY CLINIC | Age: 79
End: 2024-11-11

## 2024-11-11 ENCOUNTER — OFFICE VISIT (OUTPATIENT)
Dept: CARDIOLOGY CLINIC | Age: 79
End: 2024-11-11

## 2024-11-11 VITALS
HEART RATE: 61 BPM | BODY MASS INDEX: 26.21 KG/M2 | SYSTOLIC BLOOD PRESSURE: 126 MMHG | DIASTOLIC BLOOD PRESSURE: 66 MMHG | WEIGHT: 172.4 LBS

## 2024-11-11 DIAGNOSIS — I48.0 PAF (PAROXYSMAL ATRIAL FIBRILLATION) (HCC): Primary | ICD-10-CM

## 2024-11-11 DIAGNOSIS — Z79.01 ON CONTINUOUS ORAL ANTICOAGULATION: ICD-10-CM

## 2024-11-11 DIAGNOSIS — I10 ESSENTIAL HYPERTENSION: ICD-10-CM

## 2024-11-11 DIAGNOSIS — I50.32 CHRONIC DIASTOLIC (CONGESTIVE) HEART FAILURE (HCC): ICD-10-CM

## 2024-11-11 DIAGNOSIS — R00.1 BRADYCARDIA: ICD-10-CM

## 2024-11-11 DIAGNOSIS — I48.0 PAF (PAROXYSMAL ATRIAL FIBRILLATION) (HCC): ICD-10-CM

## 2024-11-11 DIAGNOSIS — I49.5 SSS (SICK SINUS SYNDROME) (HCC): ICD-10-CM

## 2024-11-11 DIAGNOSIS — Z51.81 ENCOUNTER FOR MONITORING DOFETILIDE THERAPY: ICD-10-CM

## 2024-11-11 DIAGNOSIS — I49.5 SICK SINUS SYNDROME (HCC): ICD-10-CM

## 2024-11-11 DIAGNOSIS — Z98.61 HISTORY OF PTCA: ICD-10-CM

## 2024-11-11 DIAGNOSIS — I35.0 NONRHEUMATIC AORTIC VALVE STENOSIS: ICD-10-CM

## 2024-11-11 DIAGNOSIS — I48.0 PAROXYSMAL ATRIAL FIBRILLATION (HCC): ICD-10-CM

## 2024-11-11 DIAGNOSIS — Z98.61 CORONARY ARTERIOSCLEROSIS AFTER PERCUTANEOUS TRANSLUMINAL CORONARY ANGIOPLASTY (PTCA): ICD-10-CM

## 2024-11-11 DIAGNOSIS — Z95.0 PACEMAKER: ICD-10-CM

## 2024-11-11 DIAGNOSIS — Z95.0 CARDIAC PACEMAKER IN SITU: Primary | ICD-10-CM

## 2024-11-11 DIAGNOSIS — Z79.899 ENCOUNTER FOR MONITORING DOFETILIDE THERAPY: ICD-10-CM

## 2024-11-11 DIAGNOSIS — I25.10 CORONARY ARTERIOSCLEROSIS AFTER PERCUTANEOUS TRANSLUMINAL CORONARY ANGIOPLASTY (PTCA): ICD-10-CM

## 2024-11-11 DIAGNOSIS — I50.32 CHRONIC DIASTOLIC CONGESTIVE HEART FAILURE (HCC): ICD-10-CM

## 2024-11-11 RX ORDER — METOPROLOL TARTRATE 25 MG/1
25 TABLET, FILM COATED ORAL 2 TIMES DAILY
Qty: 60 TABLET | Refills: 5 | Status: SHIPPED | OUTPATIENT
Start: 2024-11-11

## 2024-11-12 LAB
ANION GAP SERPL CALCULATED.3IONS-SCNC: 14 MMOL/L (ref 3–16)
BUN SERPL-MCNC: 33 MG/DL (ref 7–20)
CALCIUM SERPL-MCNC: 9.9 MG/DL (ref 8.3–10.6)
CHLORIDE SERPL-SCNC: 100 MMOL/L (ref 99–110)
CO2 SERPL-SCNC: 24 MMOL/L (ref 21–32)
CREAT SERPL-MCNC: 1.2 MG/DL (ref 0.8–1.3)
GFR SERPLBLD CREATININE-BSD FMLA CKD-EPI: 61 ML/MIN/{1.73_M2}
GLUCOSE SERPL-MCNC: 127 MG/DL (ref 70–99)
MAGNESIUM SERPL-MCNC: 1.95 MG/DL (ref 1.8–2.4)
POTASSIUM SERPL-SCNC: 4.8 MMOL/L (ref 3.5–5.1)
SODIUM SERPL-SCNC: 138 MMOL/L (ref 136–145)

## 2024-11-13 NOTE — PROGRESS NOTES
up as directed by SHANTEL Gutierrez.     Thank you for allowing us to participate in the care of Panchito Arnold.  Please do not hesitate to contact me if you have any questions.    Luis Armando Spence MD, MPH    [place] [id number], [address]   [place] [address]  Ph: [phone]  Fax: [phone]    This note was scribed in the presence of Dr Spence, by Patti Stoll RN  Physician Attestation:  The scribes documentation has been prepared under my direction and personally reviewed by me in its entirety.     I confirm that the note above accurately reflects all work, treatment, procedures, and medical decision making performed by me.

## 2024-11-15 ENCOUNTER — OFFICE VISIT (OUTPATIENT)
Dept: CARDIOLOGY CLINIC | Age: 79
End: 2024-11-15

## 2024-11-15 VITALS
HEART RATE: 88 BPM | WEIGHT: 173 LBS | DIASTOLIC BLOOD PRESSURE: 60 MMHG | SYSTOLIC BLOOD PRESSURE: 126 MMHG | BODY MASS INDEX: 26.3 KG/M2

## 2024-11-15 DIAGNOSIS — I35.0 NONRHEUMATIC AORTIC VALVE STENOSIS: Primary | ICD-10-CM

## 2024-11-15 DIAGNOSIS — I48.0 PAROXYSMAL ATRIAL FIBRILLATION (HCC): ICD-10-CM

## 2024-11-15 DIAGNOSIS — I49.5 SICK SINUS SYNDROME (HCC): ICD-10-CM

## 2024-11-15 DIAGNOSIS — I10 ESSENTIAL HYPERTENSION: ICD-10-CM

## 2024-11-15 DIAGNOSIS — I25.10 CAD IN NATIVE ARTERY: ICD-10-CM

## 2024-11-15 DIAGNOSIS — I50.32 CHRONIC DIASTOLIC (CONGESTIVE) HEART FAILURE (HCC): ICD-10-CM

## 2024-11-15 DIAGNOSIS — E78.2 MIXED HYPERLIPIDEMIA: ICD-10-CM

## 2024-12-06 ENCOUNTER — HOSPITAL ENCOUNTER (OUTPATIENT)
Age: 79
Discharge: HOME | End: 2024-12-08
Attending: INTERNAL MEDICINE
Payer: MEDICARE

## 2024-12-06 VITALS
DIASTOLIC BLOOD PRESSURE: 87 MMHG | BODY MASS INDEX: 26.22 KG/M2 | SYSTOLIC BLOOD PRESSURE: 149 MMHG | WEIGHT: 173 LBS | HEIGHT: 68 IN

## 2024-12-06 DIAGNOSIS — I35.0 NONRHEUMATIC AORTIC VALVE STENOSIS: ICD-10-CM

## 2024-12-06 PROCEDURE — C8924 2D TTE W OR W/O FOL W/CON,FU: HCPCS

## 2024-12-07 LAB
ECHO AV AREA PEAK VELOCITY: 0.7 CM2
ECHO AV AREA VTI: 0.8 CM2
ECHO AV AREA/BSA PEAK VELOCITY: 0.4 CM2/M2
ECHO AV AREA/BSA VTI: 0.4 CM2/M2
ECHO AV MEAN GRADIENT: 22 MMHG
ECHO AV MEAN VELOCITY: 2.1 M/S
ECHO AV PEAK GRADIENT: 40 MMHG
ECHO AV PEAK VELOCITY: 3.2 M/S
ECHO AV VELOCITY RATIO: 0.25
ECHO AV VTI: 74.7 CM
ECHO BSA: 1.94 M2
ECHO LV E' LATERAL VELOCITY: 8.59 CM/S
ECHO LV E' SEPTAL VELOCITY: 5.93 CM/S
ECHO LV EF PHYSICIAN: 68 %
ECHO LV FRACTIONAL SHORTENING: 38 % (ref 28–44)
ECHO LV INTERNAL DIMENSION DIASTOLE INDEX: 2.19 CM/M2
ECHO LV INTERNAL DIMENSION DIASTOLIC: 4.2 CM (ref 4.2–5.9)
ECHO LV INTERNAL DIMENSION SYSTOLIC INDEX: 1.35 CM/M2
ECHO LV INTERNAL DIMENSION SYSTOLIC: 2.6 CM
ECHO LV IVSD: 1.2 CM (ref 0.6–1)
ECHO LV MASS 2D: 167.4 G (ref 88–224)
ECHO LV MASS INDEX 2D: 87.2 G/M2 (ref 49–115)
ECHO LV POSTERIOR WALL DIASTOLIC: 1.1 CM (ref 0.6–1)
ECHO LV RELATIVE WALL THICKNESS RATIO: 0.52
ECHO LVOT AREA: 2.8 CM2
ECHO LVOT AV VTI INDEX: 0.29
ECHO LVOT DIAM: 1.9 CM
ECHO LVOT MEAN GRADIENT: 2 MMHG
ECHO LVOT PEAK GRADIENT: 3 MMHG
ECHO LVOT PEAK VELOCITY: 0.8 M/S
ECHO LVOT STROKE VOLUME INDEX: 31.7 ML/M2
ECHO LVOT SV: 60.9 ML
ECHO LVOT VTI: 21.5 CM
ECHO MV A VELOCITY: 0.31 M/S
ECHO MV E DECELERATION TIME (DT): 153 MS
ECHO MV E VELOCITY: 1.2 M/S
ECHO MV E/A RATIO: 3.87
ECHO MV E/E' LATERAL: 13.97
ECHO MV E/E' RATIO (AVERAGED): 17.1
ECHO MV E/E' SEPTAL: 20.24
ECHO PV MAX VELOCITY: 0.9 M/S
ECHO PV PEAK GRADIENT: 3 MMHG
ECHO TV REGURGITANT MAX VELOCITY: 2.18 M/S
ECHO TV REGURGITANT PEAK GRADIENT: 19 MMHG

## 2024-12-07 PROCEDURE — 93325 DOPPLER ECHO COLOR FLOW MAPG: CPT | Performed by: INTERNAL MEDICINE

## 2024-12-07 PROCEDURE — 93308 TTE F-UP OR LMTD: CPT | Performed by: INTERNAL MEDICINE

## 2024-12-07 PROCEDURE — 93321 DOPPLER ECHO F-UP/LMTD STD: CPT | Performed by: INTERNAL MEDICINE

## 2024-12-10 ENCOUNTER — TELEPHONE (OUTPATIENT)
Dept: CARDIOLOGY CLINIC | Age: 79
End: 2024-12-10

## 2024-12-11 ENCOUNTER — TELEPHONE (OUTPATIENT)
Dept: CARDIOLOGY CLINIC | Age: 79
End: 2024-12-11

## 2024-12-11 DIAGNOSIS — I35.0 SEVERE AORTIC STENOSIS: ICD-10-CM

## 2024-12-11 DIAGNOSIS — I35.0 SYMPTOMATIC SEVERE AORTIC STENOSIS WITH NORMAL EJECTION FRACTION: ICD-10-CM

## 2024-12-11 DIAGNOSIS — I25.10 CORONARY ARTERY DISEASE INVOLVING NATIVE CORONARY ARTERY OF NATIVE HEART WITHOUT ANGINA PECTORIS: Primary | ICD-10-CM

## 2024-12-11 NOTE — TELEPHONE ENCOUNTER
Pt returned my call. Aware that his echo showed AS is severe range. Agreeable to proceeding with TAVR YODER.   Nilsa, can you schedule an angiogram for pt? He prefers [facility] but Patti will need to weigh in on this. He is willing to go to [facility] or [place] too. He has no dye allergy, will need to hold his Xarelto x 48 hours. He is wanting the cath prior to 1/16/24 as his brother can help with transportation. At least a week before or after cath, he will need a TAVR CTA at [facility]. He has no port and lives at home. He is aware he will need to take his metoprolol the day of his CTA but please remind him. Labs are ordered. Thanks, Ness FUNES

## 2024-12-12 NOTE — TELEPHONE ENCOUNTER
Spoke with pt, reviewed [facility] prep instructions with his understanding and also placed in [employer].      TAVR testing is scheduled per below:    1-8-25  1:00 pm - 2:30 pm - [facility] TAVR jocelyne duong/Jordy @ [facility]  PREPS  You will need to fast for at least 8 hours prior to procedure. No caffeine the morning of.  Do not use any lotions, creams, or cologne/perfume the morning of procedure.  Hold your diuretic, Lasix the morning of procedure.   Do not take your Xarelto after 1-5-25.   All other medications can be taken in the morning with sips of water.   You will need to take 325 mg aspirin the morning of. If you are currently taking 81mg please take 4 tablets that morning.  Pre-procedure lab work will need to be completed 5-7-days prior to procedure. Please complete on either 1-2 or 1-3-25. Can be done at any [facility] that has a Lab.  Please have a responsible adult to drive you home after procedure. We advise you have someone to stay with you the 24 hours following procedure for precautionary measures. Depending on procedure you may require an overnight stay.  Cath lab will provide you with all post procedure instructions.    1-15-25 - F  11:30 am - CTA CHEST ABD PELVIC W/CONTRAST  PREPS:  * NPO 4 hours prior to procedure  * No necklaces, body piercing, no pants with zippers, belt or suspenders  * Bring a complete list of medications or the test cannot be completed.     Labs to be drawn on 1-2 or 1-3-25

## 2024-12-13 PROBLEM — I35.0 SYMPTOMATIC SEVERE AORTIC STENOSIS WITH NORMAL EJECTION FRACTION: Status: ACTIVE | Noted: 2024-12-11

## 2024-12-23 RX ORDER — FUROSEMIDE 20 MG/1
60 TABLET ORAL DAILY
Qty: 270 TABLET | Refills: 1 | Status: SHIPPED | OUTPATIENT
Start: 2024-12-23

## 2025-01-02 DIAGNOSIS — I35.0 SEVERE AORTIC STENOSIS: ICD-10-CM

## 2025-01-02 DIAGNOSIS — I25.10 CORONARY ARTERY DISEASE INVOLVING NATIVE CORONARY ARTERY OF NATIVE HEART WITHOUT ANGINA PECTORIS: ICD-10-CM

## 2025-01-03 LAB
ANION GAP SERPL CALCULATED.3IONS-SCNC: 14 MMOL/L (ref 3–16)
BUN SERPL-MCNC: 28 MG/DL (ref 7–20)
CALCIUM SERPL-MCNC: 9.9 MG/DL (ref 8.3–10.6)
CHLORIDE SERPL-SCNC: 100 MMOL/L (ref 99–110)
CO2 SERPL-SCNC: 26 MMOL/L (ref 21–32)
CREAT SERPL-MCNC: 1.3 MG/DL (ref 0.8–1.3)
DEPRECATED RDW RBC AUTO: 18.3 % (ref 12.4–15.4)
GFR SERPLBLD CREATININE-BSD FMLA CKD-EPI: 55 ML/MIN/{1.73_M2}
GLUCOSE SERPL-MCNC: 142 MG/DL (ref 70–99)
HCT VFR BLD AUTO: 35.9 % (ref 40.5–52.5)
HGB BLD-MCNC: 11.5 G/DL (ref 13.5–17.5)
MCH RBC QN AUTO: 28.6 PG (ref 26–34)
MCHC RBC AUTO-ENTMCNC: 32.1 G/DL (ref 31–36)
MCV RBC AUTO: 89 FL (ref 80–100)
PLATELET # BLD AUTO: 304 K/UL (ref 135–450)
PMV BLD AUTO: 10.4 FL (ref 5–10.5)
POTASSIUM SERPL-SCNC: 5.1 MMOL/L (ref 3.5–5.1)
RBC # BLD AUTO: 4.03 M/UL (ref 4.2–5.9)
SODIUM SERPL-SCNC: 140 MMOL/L (ref 136–145)
WBC # BLD AUTO: 7.6 K/UL (ref 4–11)

## 2025-01-15 ENCOUNTER — HOSPITAL ENCOUNTER (OUTPATIENT)
Dept: CT IMAGING | Age: 80
Discharge: HOME | End: 2025-01-15
Attending: INTERNAL MEDICINE
Payer: MEDICARE

## 2025-01-15 DIAGNOSIS — I35.0 SEVERE AORTIC STENOSIS: ICD-10-CM

## 2025-01-15 PROCEDURE — 6360000004 HC RX CONTRAST MEDICATION: Performed by: INTERNAL MEDICINE

## 2025-01-15 PROCEDURE — 74174 CTA ABD&PLVS W/CONTRAST: CPT

## 2025-01-15 RX ORDER — IOPAMIDOL 755 MG/ML
150 INJECTION, SOLUTION INTRAVASCULAR
Status: COMPLETED | OUTPATIENT
Start: 2025-01-15 | End: 2025-01-15

## 2025-01-15 RX ADMIN — IOPAMIDOL 150 ML: 755 INJECTION, SOLUTION INTRAVENOUS at 10:49

## 2025-01-29 ENCOUNTER — HOSPITAL ENCOUNTER (OUTPATIENT)
Age: 80
Setting detail: OUTPATIENT SURGERY
Discharge: HOME | End: 2025-01-29
Attending: INTERNAL MEDICINE | Admitting: INTERNAL MEDICINE
Payer: MEDICARE

## 2025-01-29 VITALS
DIASTOLIC BLOOD PRESSURE: 70 MMHG | BODY MASS INDEX: 25.91 KG/M2 | WEIGHT: 171 LBS | RESPIRATION RATE: 17 BRPM | OXYGEN SATURATION: 100 % | TEMPERATURE: 96 F | SYSTOLIC BLOOD PRESSURE: 156 MMHG | HEART RATE: 60 BPM | HEIGHT: 68 IN

## 2025-01-29 DIAGNOSIS — I35.0 SYMPTOMATIC SEVERE AORTIC STENOSIS WITH NORMAL EJECTION FRACTION: ICD-10-CM

## 2025-01-29 LAB
ANION GAP SERPL CALCULATED.3IONS-SCNC: 12 MMOL/L (ref 3–16)
BUN SERPL-MCNC: 25 MG/DL (ref 7–20)
CALCIUM SERPL-MCNC: 9.2 MG/DL (ref 8.3–10.6)
CHLORIDE SERPL-SCNC: 101 MMOL/L (ref 99–110)
CO2 SERPL-SCNC: 23 MMOL/L (ref 21–32)
CREAT SERPL-MCNC: 1.3 MG/DL (ref 0.8–1.3)
DEPRECATED RDW RBC AUTO: 18 % (ref 12.4–15.4)
ECHO BSA: 1.93 M2
EKG ATRIAL RATE: 234 BPM
EKG DIAGNOSIS: NORMAL
EKG Q-T INTERVAL: 444 MS
EKG QRS DURATION: 146 MS
EKG QTC CALCULATION (BAZETT): 502 MS
EKG R AXIS: -14 DEGREES
EKG T AXIS: 8 DEGREES
EKG VENTRICULAR RATE: 77 BPM
GFR SERPLBLD CREATININE-BSD FMLA CKD-EPI: 55 ML/MIN/{1.73_M2}
GLUCOSE SERPL-MCNC: 138 MG/DL (ref 70–99)
HCT VFR BLD AUTO: 34.6 % (ref 40.5–52.5)
HGB BLD-MCNC: 11.2 G/DL (ref 13.5–17.5)
MCH RBC QN AUTO: 28.6 PG (ref 26–34)
MCHC RBC AUTO-ENTMCNC: 32.5 G/DL (ref 31–36)
MCV RBC AUTO: 88.1 FL (ref 80–100)
PLATELET # BLD AUTO: 306 K/UL (ref 135–450)
PMV BLD AUTO: 9.7 FL (ref 5–10.5)
POTASSIUM SERPL-SCNC: 4.3 MMOL/L (ref 3.5–5.1)
RBC # BLD AUTO: 3.92 M/UL (ref 4.2–5.9)
SODIUM SERPL-SCNC: 136 MMOL/L (ref 136–145)
WBC # BLD AUTO: 5.9 K/UL (ref 4–11)

## 2025-01-29 PROCEDURE — 2580000003 HC RX 258: Performed by: INTERNAL MEDICINE

## 2025-01-29 PROCEDURE — 93452 LEFT HRT CATH W/VENTRCLGRPHY: CPT | Performed by: INTERNAL MEDICINE

## 2025-01-29 PROCEDURE — 2500000003 HC RX 250 WO HCPCS: Performed by: INTERNAL MEDICINE

## 2025-01-29 PROCEDURE — 6360000002 HC RX W HCPCS: Performed by: INTERNAL MEDICINE

## 2025-01-29 PROCEDURE — 99152 MOD SED SAME PHYS/QHP 5/>YRS: CPT | Performed by: INTERNAL MEDICINE

## 2025-01-29 PROCEDURE — C1894 INTRO/SHEATH, NON-LASER: HCPCS | Performed by: INTERNAL MEDICINE

## 2025-01-29 PROCEDURE — 7100000011 HC PHASE II RECOVERY - ADDTL 15 MIN: Performed by: INTERNAL MEDICINE

## 2025-01-29 PROCEDURE — 2709999900 HC NON-CHARGEABLE SUPPLY: Performed by: INTERNAL MEDICINE

## 2025-01-29 PROCEDURE — 93005 ELECTROCARDIOGRAM TRACING: CPT | Performed by: INTERNAL MEDICINE

## 2025-01-29 PROCEDURE — C1769 GUIDE WIRE: HCPCS | Performed by: INTERNAL MEDICINE

## 2025-01-29 PROCEDURE — 7100000010 HC PHASE II RECOVERY - FIRST 15 MIN: Performed by: INTERNAL MEDICINE

## 2025-01-29 PROCEDURE — 93454 CORONARY ARTERY ANGIO S&I: CPT | Performed by: INTERNAL MEDICINE

## 2025-01-29 PROCEDURE — 6360000004 HC RX CONTRAST MEDICATION: Performed by: INTERNAL MEDICINE

## 2025-01-29 PROCEDURE — 80048 BASIC METABOLIC PNL TOTAL CA: CPT

## 2025-01-29 PROCEDURE — 85027 COMPLETE CBC AUTOMATED: CPT

## 2025-01-29 RX ORDER — SODIUM CHLORIDE 0.9 % (FLUSH) 0.9 %
5-40 SYRINGE (ML) INJECTION PRN
Status: DISCONTINUED | OUTPATIENT
Start: 2025-01-29 | End: 2025-01-29 | Stop reason: HOSPADM

## 2025-01-29 RX ORDER — SODIUM CHLORIDE 9 MG/ML
INJECTION, SOLUTION INTRAVENOUS PRN
Status: DISCONTINUED | OUTPATIENT
Start: 2025-01-29 | End: 2025-01-29 | Stop reason: HOSPADM

## 2025-01-29 RX ORDER — SODIUM CHLORIDE 0.9 % (FLUSH) 0.9 %
5-40 SYRINGE (ML) INJECTION EVERY 12 HOURS SCHEDULED
Status: DISCONTINUED | OUTPATIENT
Start: 2025-01-29 | End: 2025-01-29 | Stop reason: HOSPADM

## 2025-01-29 RX ORDER — ASPIRIN 325 MG
325 TABLET ORAL ONCE
Status: DISCONTINUED | OUTPATIENT
Start: 2025-01-29 | End: 2025-01-29 | Stop reason: HOSPADM

## 2025-01-29 RX ORDER — MIDAZOLAM HYDROCHLORIDE 1 MG/ML
INJECTION, SOLUTION INTRAMUSCULAR; INTRAVENOUS PRN
Status: DISCONTINUED | OUTPATIENT
Start: 2025-01-29 | End: 2025-01-29 | Stop reason: HOSPADM

## 2025-01-29 RX ORDER — FENTANYL CITRATE 50 UG/ML
INJECTION, SOLUTION INTRAMUSCULAR; INTRAVENOUS PRN
Status: DISCONTINUED | OUTPATIENT
Start: 2025-01-29 | End: 2025-01-29 | Stop reason: HOSPADM

## 2025-01-29 RX ORDER — ACETAMINOPHEN 325 MG/1
650 TABLET ORAL EVERY 4 HOURS PRN
Status: DISCONTINUED | OUTPATIENT
Start: 2025-01-29 | End: 2025-01-29 | Stop reason: HOSPADM

## 2025-01-29 RX ORDER — IOPAMIDOL 755 MG/ML
INJECTION, SOLUTION INTRAVASCULAR PRN
Status: DISCONTINUED | OUTPATIENT
Start: 2025-01-29 | End: 2025-01-29 | Stop reason: HOSPADM

## 2025-01-29 RX ADMIN — Medication 10 ML: at 07:42

## 2025-01-29 RX ADMIN — SODIUM CHLORIDE: 9 INJECTION, SOLUTION INTRAVENOUS at 07:42

## 2025-01-29 NOTE — PROGRESS NOTES
Radial compression band removed from right wrist without difficulty. No complications noted to cath site; gauze, tegaderm dressing and arm board applied. Education provided to patient and nephew in regards to post cath restrictions. All questions answered. Patient verbalized understanding. Will monitor.

## 2025-01-29 NOTE — DISCHARGE INSTRUCTIONS
CARDIAC ANGIOGRAM (LEFT HEART CATH) - RADIAL ACCESS    Care of your puncture site:  Remove clear bandage 24 hours after the procedure.  25 10:00 AM  May shower in 24 hours  Inspect the site daily and gently clean using soap and water.  Dry thoroughly and apply a Band-Aid.    Normal Observations:  Soreness or tenderness which may last one week.  Possible bruising that could last 2 weeks.    Activity:  You may resume driving 24 hours following the procedure.  You may resume normal activity in 3 days or after the wound heals.  Avoid lifting more than 10 pounds for 3 days with affected arm.  Do not make important / legal decisions within 24 hours after procedure.    Nutrition:  Regular diet   Drink at least 8 to 10 glasses of decaffeinated, non-alcoholic fluid for the next 24 hours to flush the x-ray dye used for your angiogram out of your body.    Call your doctor immediately if your condition worsens, for any other concerns, for a follow-up appointment or if you experience any of the [phone]  Significant bleeding that does not stop after 10 minutes of applying firm pressure on the puncture site.  CALL 911  Increased swelling of the wrist.  Unusual pain, numbness, or tingling of the wrist/arm.   Any signs of infection such as: redness, yellow drainage at the site, swelling or pain.  IF experiencing any recurrent chest pain, arm or jaw pain, shortness of breath,sweating,nausea & vomiting, lighteheadedness or sudden weak.      Other Instructions:  Hold Metformin or Metformin containing drugs for 48 hours after procedure. RESTART on 25

## 2025-01-29 NOTE — H&P
Reason for Referral: Aortic stenosis     Referring physician: Lee Anton MD     CC: \"I am here to discuss my echo.\"        Subjective:      History of Present Illness:     Panchito Arnold is a 79 y.o. patient with a PMH significant for atrial fibrillation, aortic stenosis, coronary artery disease, chronic diastolic heart failure, hypertension, sick sinus syndrome and diabetes.      Today, he is here to discuss his echocardiogram results. He continues to work out at the gym with his . He does notice that he has increased dyspnea and lightheadedness when he moves to quickly. Patient denies exertional chest pain/pressure, dyspnea at rest, PND, orthopnea, palpitations, weight changes, changes in LE edema, and syncope. Patient reports compliance to his medications.      Past Medical History:   has a past medical history of Aortic stenosis, Atrial fibrillation (HCC), CAD (coronary artery disease), native coronary artery, Diabetes mellitus (HCC), Diastolic congestive heart failure (HCC), Essential hypertension, Gout, Hypothyroid, Left carotid bruit, Obstructive sleep apnea, Peripheral neuropathy, Pneumonia, and Sick sinus syndrome (HCC).     Surgical History:   has a past surgical history that includes Diagnostic Cardiac Cath Lab Procedure (12/17/07); Thyroidectomy (2011); Cholecystectomy, open (1993); Total knee arthroplasty (Right, 3/12/14); Pacemaker insertion (02/2017); and Coronary angioplasty (12/17/07).      Social History:   reports that he has never smoked. He has never used smokeless tobacco. He reports that he does not drink alcohol and does not use drugs.      Family History:  family history includes Heart Attack in his maternal grandfather; Heart Defect in his maternal grandfather and mother; Heart Disease in his father; Heart Surgery in his father; Pacemaker in his mother.     Home Medications:  Were reviewed and are listed in nursing record and/or below  Home Medications           Prior to

## 2025-01-29 NOTE — FLOWSHEET NOTE
Patient received post procedure in stable condition.   Post-cath handoff/site assessment performed to right radial   Pt is alert and oriented x4, No distress noted. VSS-see flowsheet.   Post-cath restrictions/instructions provided  Patient provided with snack/drink.   No further needs at this time, call light within reach.   MD to talk to patient and family soon.

## 2025-01-29 NOTE — CATH APPROPRIATE USE CRITERIA
[facility] CathPCI V5 Collection Form    Pre-Procedure Information  - Electrocardiac assessment method: ECG     - The assessment result was abnormal.      - No new antiarrhythmic therapy was received prior to evaluation within the cath lab.      - Abnormal assessment findings include: other electrocardiac abnormality    Indications and Presentation  - Indication(s) for cath lab visit: pre-operative evaluation    - NYHA class: II.  - Angina type: asymptomatic.  - Patient took anti-anginal meds within the past 2 weeks (beta blockers).  - CHF onset/type: diastolic.  - AV demonstrates severe stenosis.  - Ventricular support was not required.     Pre-Operative Evaluation  - Type of surgery: cardiac surgery

## 2025-01-30 ENCOUNTER — TELEPHONE (OUTPATIENT)
Dept: CARDIOLOGY CLINIC | Age: 80
End: 2025-01-30

## 2025-01-30 NOTE — TELEPHONE ENCOUNTER
Called and spoke to patient and let him know he can restart his Xarelto today. He verbalized understanding.

## 2025-01-30 NOTE — TELEPHONE ENCOUNTER
Panchito called to see when he is to start taking his Xarelto? Relayed he cannot recall what was discussed post procedure.    Please advise.    Panchito' callback: [phone]

## 2025-02-11 PROCEDURE — 93294 REM INTERROG EVL PM/LDLS PM: CPT | Performed by: INTERNAL MEDICINE

## 2025-02-11 PROCEDURE — 93296 REM INTERROG EVL PM/IDS: CPT | Performed by: INTERNAL MEDICINE

## 2025-02-13 ENCOUNTER — OFFICE VISIT (OUTPATIENT)
Age: 80
End: 2025-02-13
Payer: MEDICARE

## 2025-02-13 VITALS
BODY MASS INDEX: 25.13 KG/M2 | OXYGEN SATURATION: 100 % | WEIGHT: 165.8 LBS | TEMPERATURE: 97 F | SYSTOLIC BLOOD PRESSURE: 120 MMHG | DIASTOLIC BLOOD PRESSURE: 70 MMHG | HEIGHT: 68 IN | HEART RATE: 94 BPM

## 2025-02-13 DIAGNOSIS — I49.5 SICK SINUS SYNDROME (HCC): ICD-10-CM

## 2025-02-13 DIAGNOSIS — E78.5 HYPERLIPIDEMIA, UNSPECIFIED HYPERLIPIDEMIA TYPE: ICD-10-CM

## 2025-02-13 DIAGNOSIS — E11.9 TYPE 2 DIABETES MELLITUS WITHOUT COMPLICATION, WITHOUT LONG-TERM CURRENT USE OF INSULIN (HCC): ICD-10-CM

## 2025-02-13 DIAGNOSIS — I10 ESSENTIAL HYPERTENSION: ICD-10-CM

## 2025-02-13 DIAGNOSIS — Z98.61 HISTORY OF CORONARY ANGIOPLASTY: ICD-10-CM

## 2025-02-13 DIAGNOSIS — Z95.0 CARDIAC PACEMAKER IN SITU: ICD-10-CM

## 2025-02-13 DIAGNOSIS — G47.33 OBSTRUCTIVE SLEEP APNEA ON CPAP: ICD-10-CM

## 2025-02-13 DIAGNOSIS — I25.10 CORONARY ARTERY DISEASE INVOLVING NATIVE CORONARY ARTERY OF NATIVE HEART WITHOUT ANGINA PECTORIS: Primary | ICD-10-CM

## 2025-02-13 DIAGNOSIS — I25.2 OLD MYOCARDIAL INFARCTION: ICD-10-CM

## 2025-02-13 DIAGNOSIS — I48.0 PAROXYSMAL ATRIAL FIBRILLATION (HCC): ICD-10-CM

## 2025-02-13 PROCEDURE — 1159F MED LIST DOCD IN RCRD: CPT | Performed by: STUDENT IN AN ORGANIZED HEALTH CARE EDUCATION/TRAINING PROGRAM

## 2025-02-13 PROCEDURE — 1160F RVW MEDS BY RX/DR IN RCRD: CPT | Performed by: STUDENT IN AN ORGANIZED HEALTH CARE EDUCATION/TRAINING PROGRAM

## 2025-02-13 PROCEDURE — 1123F ACP DISCUSS/DSCN MKR DOCD: CPT | Performed by: STUDENT IN AN ORGANIZED HEALTH CARE EDUCATION/TRAINING PROGRAM

## 2025-02-13 PROCEDURE — G8417 CALC BMI ABV UP PARAM F/U: HCPCS | Performed by: STUDENT IN AN ORGANIZED HEALTH CARE EDUCATION/TRAINING PROGRAM

## 2025-02-13 PROCEDURE — 1036F TOBACCO NON-USER: CPT | Performed by: STUDENT IN AN ORGANIZED HEALTH CARE EDUCATION/TRAINING PROGRAM

## 2025-02-13 PROCEDURE — 3078F DIAST BP <80 MM HG: CPT | Performed by: STUDENT IN AN ORGANIZED HEALTH CARE EDUCATION/TRAINING PROGRAM

## 2025-02-13 PROCEDURE — 99205 OFFICE O/P NEW HI 60 MIN: CPT | Performed by: STUDENT IN AN ORGANIZED HEALTH CARE EDUCATION/TRAINING PROGRAM

## 2025-02-13 PROCEDURE — G8427 DOCREV CUR MEDS BY ELIG CLIN: HCPCS | Performed by: STUDENT IN AN ORGANIZED HEALTH CARE EDUCATION/TRAINING PROGRAM

## 2025-02-13 PROCEDURE — 3074F SYST BP LT 130 MM HG: CPT | Performed by: STUDENT IN AN ORGANIZED HEALTH CARE EDUCATION/TRAINING PROGRAM

## 2025-02-13 NOTE — PROGRESS NOTES
Review of Systems:  Constitutional:  No night sweats, headaches, weight loss.  Eyes:  No glaucoma, cataracts.   ENMT:  No nosebleeds, deviated septum.   Cardiac:  +PAF  Vascular:  No claudication, varicosities.  GI:  No PUD, heartburn.  :  No kidney stones, frequent UTIs  Musculoskeletal:  No arthritis, gout.  Respiratory:  +SOB with exertion, emphysema, asthma.  Integumentary:  No dermatitis, itching, rash.  Neurological:  No stroke, TIAs, seizures.  Psychiatric:  No depression, anxiety.  Endocrine:Type 2 DM  , Thyroidectomy .  Hematologic:  No bleeding, + bruising on Xarelto  Immunologic:  Pre- cancer on scalp, steroid therapies.    
spectrum of therapies ranging from pericardial drain, to ECMO and salvage emergent open heart surgery with possible deep hypothermic circulatory arrest to repair the injury and replace the aortic valve. In such cases, if salvage surgery is successful, recovery can include many days to weeks of ICU stay, prolonged ventilation, temporary or permanent tracheostomy, feeding gastrostomy tube, and hemodialysis. The patient and family have an excellent understanding of this. The patient is quite clear that he DOES want all life saving measures as listed above.    Thank you very much Dr. Spence for the opportunity to participate in Panchito Arnold's care. Please do not hesitate to reach out with any questions or concerns.     Ashwini Mackey MD  [place] [address]

## 2025-02-17 ENCOUNTER — TELEPHONE (OUTPATIENT)
Dept: CARDIOLOGY CLINIC | Age: 80
End: 2025-02-17

## 2025-02-17 NOTE — TELEPHONE ENCOUNTER
Requested Prescriptions     Pending Prescriptions Disp Refills    rivaroxaban (XARELTO) 20 MG TABS tablet 90 tablet 3     Sig: Take 1 tablet by mouth daily (with breakfast)            Checked Correct Pharmacy: Yes    Any changes since last refill? No     Number: 90  Refills: 3    Last OV: 11/15/2024 Provider: Dr. Luis Armando Spence MD    Next OV: 4/28/2025 Provider: MARIAM    Last Labs: 01/29/2025  CBC:   Lab Results   Component Value Date    WBC 5.9 01/29/2025    HGB 11.2 (L) 01/29/2025    HCT 34.6 (L) 01/29/2025    MCV 88.1 01/29/2025     01/29/2025     BMP:   Lab Results   Component Value Date/Time     01/29/2025 07:25 AM    K 4.3 01/29/2025 07:25 AM    K 5.1 10/10/2021 10:35 PM     01/29/2025 07:25 AM    CO2 23 01/29/2025 07:25 AM    BUN 25 01/29/2025 07:25 AM    CREATININE 1.3 01/29/2025 07:25 AM    GLUCOSE 138 01/29/2025 07:25 AM    CALCIUM 9.2 01/29/2025 07:25 AM    LABGLOM 55 01/29/2025 07:25 AM    LABGLOM >60 10/11/2023 01:52 PM       Last Fill Date 01/12/2025

## 2025-02-17 NOTE — TELEPHONE ENCOUNTER
Spoke to pt re scheduling TAVR procedure. Offered 3/4 or 3/11/25. Pt wants his brother to be in town for procedure so chose 4/1/25. Aware arrival time 7:30am. Aware of TAVR PAT on 3/21/25 at  office with Dr. Spence at 9am. Ness FUNES

## 2025-03-17 DIAGNOSIS — I35.0 SYMPTOMATIC SEVERE AORTIC STENOSIS WITH NORMAL EJECTION FRACTION: ICD-10-CM

## 2025-03-17 DIAGNOSIS — I50.32 CHRONIC DIASTOLIC (CONGESTIVE) HEART FAILURE: ICD-10-CM

## 2025-03-17 DIAGNOSIS — I35.0 NONRHEUMATIC AORTIC VALVE STENOSIS: ICD-10-CM

## 2025-03-17 DIAGNOSIS — R06.02 SHORTNESS OF BREATH: Primary | ICD-10-CM

## 2025-03-19 NOTE — PROGRESS NOTES
MD   gemfibrozil (LOPID) 600 MG tablet Take 1 tablet by mouth 2 times daily (before meals)   Yes Provider, MD Wilian   metformin (GLUCOPHAGE) 1000 MG tablet Take 1 tablet by mouth 2 times daily (with meals)   Yes Provider, Wilian, MD        CURRENT Medications:  No current facility-administered medications for this visit.  perflutren lipid microspheres (DEFINITY) injection 1.5 mL, ONCE PRN  perflutren lipid microspheres (DEFINITY) injection 1.5 mL, ONCE PRN        Allergies:  Erythromycin and Amlodipine           Review of Systems: SEE HPI   Constitutional: no unanticipated weight loss. There's been no change in energy level, sleep pattern, or activity level. No fevers, chills.   Eyes: No visual changes or diplopia. No scleral icterus.  ENT: No Headaches, hearing loss or vertigo. No mouth sores or sore throat.  Cardiovascular: No Chest pain, tightness or discomfort.   No Shortness of breath.   No Dyspnea on exertion, Orthopnea, Paroxysmal nocturnal dyspnea or breathlessness at rest.  No Palpitations.  No Syncope ('blackouts', 'faints', 'collapse') or dizziness.   Respiratory: No cough or wheezing, no sputum production. No hematemesis.    Gastrointestinal: No abdominal pain, appetite loss, blood in stools. No change in bowel or bladder habits.  Genitourinary: No dysuria, trouble voiding, or hematuria.  Musculoskeletal:  No gait disturbance, no joint complaints.  Integumentary: No rash or pruritis.  Neurological: No headache, diplopia, change in muscle strength, numbness or tingling.   Psychiatric: No anxiety or depression.  Endocrine: No temperature intolerance. No excessive thirst, fluid intake, or urination. No tremor.  Hematologic/Lymphatic: No abnormal bruising or bleeding, blood clots or swollen lymph nodes.  Allergic/Immunologic: No nasal congestion or hives.      Objective:     PHYSICAL EXAM:      Vitals:    03/21/25 0849   BP: (!) 144/60   Pulse: 68      Weight - Scale: 78.5 kg (173 lb)       General

## 2025-03-21 ENCOUNTER — OFFICE VISIT (OUTPATIENT)
Dept: CARDIOLOGY CLINIC | Age: 80
End: 2025-03-21
Payer: MEDICARE

## 2025-03-21 VITALS
SYSTOLIC BLOOD PRESSURE: 144 MMHG | HEART RATE: 68 BPM | BODY MASS INDEX: 26.3 KG/M2 | WEIGHT: 173 LBS | DIASTOLIC BLOOD PRESSURE: 60 MMHG

## 2025-03-21 DIAGNOSIS — R06.02 SHORTNESS OF BREATH: ICD-10-CM

## 2025-03-21 DIAGNOSIS — I25.10 CAD IN NATIVE ARTERY: ICD-10-CM

## 2025-03-21 DIAGNOSIS — I48.0 PAROXYSMAL ATRIAL FIBRILLATION (HCC): ICD-10-CM

## 2025-03-21 DIAGNOSIS — I49.5 SICK SINUS SYNDROME (HCC): ICD-10-CM

## 2025-03-21 DIAGNOSIS — I35.0 SYMPTOMATIC SEVERE AORTIC STENOSIS WITH NORMAL EJECTION FRACTION: ICD-10-CM

## 2025-03-21 DIAGNOSIS — I35.0 NONRHEUMATIC AORTIC VALVE STENOSIS: Primary | ICD-10-CM

## 2025-03-21 DIAGNOSIS — I50.32 CHRONIC DIASTOLIC (CONGESTIVE) HEART FAILURE: ICD-10-CM

## 2025-03-21 DIAGNOSIS — I35.0 NONRHEUMATIC AORTIC VALVE STENOSIS: ICD-10-CM

## 2025-03-21 DIAGNOSIS — I10 ESSENTIAL HYPERTENSION: ICD-10-CM

## 2025-03-21 PROCEDURE — 99215 OFFICE O/P EST HI 40 MIN: CPT | Performed by: INTERNAL MEDICINE

## 2025-03-21 PROCEDURE — 3077F SYST BP >= 140 MM HG: CPT | Performed by: INTERNAL MEDICINE

## 2025-03-21 PROCEDURE — 1123F ACP DISCUSS/DSCN MKR DOCD: CPT | Performed by: INTERNAL MEDICINE

## 2025-03-21 PROCEDURE — 1159F MED LIST DOCD IN RCRD: CPT | Performed by: INTERNAL MEDICINE

## 2025-03-21 PROCEDURE — G8427 DOCREV CUR MEDS BY ELIG CLIN: HCPCS | Performed by: INTERNAL MEDICINE

## 2025-03-21 PROCEDURE — 1036F TOBACCO NON-USER: CPT | Performed by: INTERNAL MEDICINE

## 2025-03-21 PROCEDURE — 3078F DIAST BP <80 MM HG: CPT | Performed by: INTERNAL MEDICINE

## 2025-03-21 PROCEDURE — G8417 CALC BMI ABV UP PARAM F/U: HCPCS | Performed by: INTERNAL MEDICINE

## 2025-03-22 LAB
ALBUMIN SERPL-MCNC: 4.2 G/DL (ref 3.4–5)
ALP SERPL-CCNC: 112 U/L (ref 40–129)
ALT SERPL-CCNC: 15 U/L (ref 10–40)
ANION GAP SERPL CALCULATED.3IONS-SCNC: 11 MMOL/L (ref 3–16)
AST SERPL-CCNC: 26 U/L (ref 15–37)
BACTERIA URNS QL MICRO: NORMAL /HPF
BASOPHILS # BLD: 0.1 K/UL (ref 0–0.2)
BASOPHILS NFR BLD: 1.6 %
BILIRUB DIRECT SERPL-MCNC: <0.1 MG/DL (ref 0–0.3)
BILIRUB INDIRECT SERPL-MCNC: ABNORMAL MG/DL (ref 0–1)
BILIRUB SERPL-MCNC: <0.2 MG/DL (ref 0–1)
BILIRUB UR QL STRIP.AUTO: NEGATIVE
BUN SERPL-MCNC: 35 MG/DL (ref 7–20)
CALCIUM SERPL-MCNC: 9.3 MG/DL (ref 8.3–10.6)
CHLORIDE SERPL-SCNC: 101 MMOL/L (ref 99–110)
CLARITY UR: CLEAR
CO2 SERPL-SCNC: 24 MMOL/L (ref 21–32)
COLOR UR: YELLOW
CREAT SERPL-MCNC: 1.2 MG/DL (ref 0.8–1.3)
DEPRECATED RDW RBC AUTO: 17 % (ref 12.4–15.4)
EOSINOPHIL # BLD: 0.2 K/UL (ref 0–0.6)
EOSINOPHIL NFR BLD: 4.6 %
EPI CELLS #/AREA URNS AUTO: 0 /HPF (ref 0–5)
GFR SERPLBLD CREATININE-BSD FMLA CKD-EPI: 60 ML/MIN/{1.73_M2}
GLUCOSE SERPL-MCNC: 210 MG/DL (ref 70–99)
GLUCOSE UR STRIP.AUTO-MCNC: NEGATIVE MG/DL
HCT VFR BLD AUTO: 32.5 % (ref 40.5–52.5)
HGB BLD-MCNC: 10.6 G/DL (ref 13.5–17.5)
HGB UR QL STRIP.AUTO: NEGATIVE
HYALINE CASTS #/AREA URNS AUTO: 0 /LPF (ref 0–8)
INR PPP: 1.54 (ref 0.85–1.15)
KETONES UR STRIP.AUTO-MCNC: NEGATIVE MG/DL
LEUKOCYTE ESTERASE UR QL STRIP.AUTO: ABNORMAL
LYMPHOCYTES # BLD: 0.8 K/UL (ref 1–5.1)
LYMPHOCYTES NFR BLD: 21 %
MCH RBC QN AUTO: 29.1 PG (ref 26–34)
MCHC RBC AUTO-ENTMCNC: 32.6 G/DL (ref 31–36)
MCV RBC AUTO: 89.2 FL (ref 80–100)
MONOCYTES # BLD: 0.5 K/UL (ref 0–1.3)
MONOCYTES NFR BLD: 13.1 %
NEUTROPHILS # BLD: 2.4 K/UL (ref 1.7–7.7)
NEUTROPHILS NFR BLD: 59.7 %
NITRITE UR QL STRIP.AUTO: NEGATIVE
PH UR STRIP.AUTO: 6 [PH] (ref 5–8)
PLATELET # BLD AUTO: 293 K/UL (ref 135–450)
PMV BLD AUTO: 10.6 FL (ref 5–10.5)
POTASSIUM SERPL-SCNC: 4.6 MMOL/L (ref 3.5–5.1)
PROT SERPL-MCNC: 6.3 G/DL (ref 6.4–8.2)
PROT UR STRIP.AUTO-MCNC: NEGATIVE MG/DL
PROTHROMBIN TIME: 18.7 SEC (ref 11.9–14.9)
RBC # BLD AUTO: 3.65 M/UL (ref 4.2–5.9)
RBC CLUMPS #/AREA URNS AUTO: 0 /HPF (ref 0–4)
SODIUM SERPL-SCNC: 136 MMOL/L (ref 136–145)
SP GR UR STRIP.AUTO: 1.02 (ref 1–1.03)
UA COMPLETE W REFLEX CULTURE PNL UR: ABNORMAL
UA DIPSTICK W REFLEX MICRO PNL UR: YES
URN SPEC COLLECT METH UR: ABNORMAL
UROBILINOGEN UR STRIP-ACNC: 0.2 E.U./DL
WBC # BLD AUTO: 4 K/UL (ref 4–11)
WBC #/AREA URNS AUTO: 1 /HPF (ref 0–5)

## 2025-03-23 ENCOUNTER — RESULTS FOLLOW-UP (OUTPATIENT)
Dept: CARDIOLOGY CLINIC | Age: 80
End: 2025-03-23

## 2025-03-23 LAB — BACTERIA UR CULT: NORMAL

## 2025-03-31 ENCOUNTER — ANESTHESIA EVENT (OUTPATIENT)
Age: 80
DRG: 267 | End: 2025-03-31
Payer: MEDICARE

## 2025-04-01 ENCOUNTER — HOSPITAL ENCOUNTER (INPATIENT)
Age: 80
LOS: 1 days | Discharge: HOME | DRG: 267 | End: 2025-04-02
Attending: INTERNAL MEDICINE | Admitting: INTERNAL MEDICINE
Payer: MEDICARE

## 2025-04-01 ENCOUNTER — APPOINTMENT (OUTPATIENT)
Age: 80
DRG: 267 | End: 2025-04-01
Attending: INTERNAL MEDICINE
Payer: MEDICARE

## 2025-04-01 ENCOUNTER — ANESTHESIA (OUTPATIENT)
Age: 80
DRG: 267 | End: 2025-04-01
Payer: MEDICARE

## 2025-04-01 ENCOUNTER — TELEPHONE (OUTPATIENT)
Dept: CARDIOLOGY CLINIC | Age: 80
End: 2025-04-01

## 2025-04-01 DIAGNOSIS — Z95.2 HISTORY OF TRANSCATHETER AORTIC VALVE REPLACEMENT (TAVR): ICD-10-CM

## 2025-04-01 DIAGNOSIS — I35.0 NONRHEUMATIC AORTIC VALVE STENOSIS: ICD-10-CM

## 2025-04-01 DIAGNOSIS — I35.0 AORTIC VALVE STENOSIS, ETIOLOGY OF CARDIAC VALVE DISEASE UNSPECIFIED: ICD-10-CM

## 2025-04-01 DIAGNOSIS — I35.0 NONRHEUMATIC AORTIC VALVE STENOSIS: Primary | ICD-10-CM

## 2025-04-01 DIAGNOSIS — I35.0 SYMPTOMATIC SEVERE AORTIC STENOSIS WITH NORMAL EJECTION FRACTION: Primary | ICD-10-CM

## 2025-04-01 LAB
ABO/RH: NORMAL
ACTIVATED CLOTTING TIME: 110 SEC (ref 99–130)
ACTIVATED CLOTTING TIME: 263 SEC (ref 99–130)
ACTIVATED CLOTTING TIME: 97 SEC (ref 99–130)
ANTIBODY SCREEN: NORMAL
BASE EXCESS BLDA CALC-SCNC: -2 MMOL/L (ref -3–3)
BASE EXCESS BLDA CALC-SCNC: -3 MMOL/L (ref -3–3)
BASE EXCESS BLDA CALC-SCNC: -4 MMOL/L (ref -3–3)
BLOOD BANK DISPENSE STATUS: NORMAL
BLOOD BANK PRODUCT CODE: NORMAL
BPU ID: NORMAL
CA-I BLD-SCNC: 1.21 MMOL/L (ref 1.12–1.32)
CA-I BLD-SCNC: 1.22 MMOL/L (ref 1.12–1.32)
CA-I BLD-SCNC: 1.24 MMOL/L (ref 1.12–1.32)
CO2 BLDA-SCNC: 24 MMOL/L
CO2 BLDA-SCNC: 25 MMOL/L
CO2 BLDA-SCNC: 25 MMOL/L
DESCRIPTION BLOOD BANK: NORMAL
ECHO AO ROOT DIAM: 3.5 CM
ECHO AO ROOT INDEX: 1.83 CM/M2
ECHO AR MAX VEL PISA: 4.3 M/S
ECHO AV AREA PEAK VELOCITY: 1.5 CM2
ECHO AV AREA VTI: 1.6 CM2
ECHO AV AREA/BSA PEAK VELOCITY: 0.8 CM2/M2
ECHO AV AREA/BSA VTI: 0.8 CM2/M2
ECHO AV MEAN GRADIENT: 18 MMHG
ECHO AV MEAN VELOCITY: 2 M/S
ECHO AV PEAK GRADIENT: 32 MMHG
ECHO AV PEAK VELOCITY: 2.9 M/S
ECHO AV REGURGITANT PHT: 490 MS
ECHO AV VELOCITY RATIO: 0.34
ECHO AV VTI: 77.7 CM
ECHO BSA: 1.92 M2
ECHO LVOT AREA: 4.2 CM2
ECHO LVOT AV VTI INDEX: 0.39
ECHO LVOT DIAM: 2.3 CM
ECHO LVOT MEAN GRADIENT: 3 MMHG
ECHO LVOT PEAK GRADIENT: 4 MMHG
ECHO LVOT PEAK VELOCITY: 1 M/S
ECHO LVOT STROKE VOLUME INDEX: 65.2 ML/M2
ECHO LVOT SV: 124.6 ML
ECHO LVOT VTI: 30 CM
EKG ATRIAL RATE: 60 BPM
EKG ATRIAL RATE: 60 BPM
EKG DIAGNOSIS: NORMAL
EKG DIAGNOSIS: NORMAL
EKG P AXIS: 186 DEGREES
EKG P AXIS: 91 DEGREES
EKG P-R INTERVAL: 320 MS
EKG P-R INTERVAL: 340 MS
EKG Q-T INTERVAL: 504 MS
EKG Q-T INTERVAL: 536 MS
EKG QRS DURATION: 148 MS
EKG QRS DURATION: 154 MS
EKG QTC CALCULATION (BAZETT): 504 MS
EKG QTC CALCULATION (BAZETT): 536 MS
EKG R AXIS: -1 DEGREES
EKG R AXIS: 56 DEGREES
EKG T AXIS: 10 DEGREES
EKG T AXIS: 84 DEGREES
EKG VENTRICULAR RATE: 60 BPM
EKG VENTRICULAR RATE: 60 BPM
GLUCOSE BLD-MCNC: 102 MG/DL (ref 70–99)
GLUCOSE BLD-MCNC: 102 MG/DL (ref 70–99)
GLUCOSE BLD-MCNC: 106 MG/DL (ref 70–99)
GLUCOSE BLD-MCNC: 125 MG/DL (ref 70–99)
GLUCOSE BLD-MCNC: 141 MG/DL (ref 70–99)
GLUCOSE BLD-MCNC: 234 MG/DL (ref 70–99)
GLUCOSE BLD-MCNC: 278 MG/DL (ref 70–99)
HCO3 BLDA-SCNC: 22.2 MMOL/L (ref 21–29)
HCO3 BLDA-SCNC: 23.6 MMOL/L (ref 21–29)
HCO3 BLDA-SCNC: 23.7 MMOL/L (ref 21–29)
HCT VFR BLD AUTO: 25 % (ref 40.5–52.5)
HCT VFR BLD AUTO: 28 % (ref 40.5–52.5)
HGB BLD CALC-MCNC: 8.4 GM/DL (ref 13.5–17.5)
HGB BLD CALC-MCNC: 9.5 GM/DL (ref 13.5–17.5)
LACTATE BLD-SCNC: 1.1 MMOL/L (ref 0.4–2)
LACTATE BLD-SCNC: 1.6 MMOL/L (ref 0.4–2)
LACTATE BLD-SCNC: 1.72 MMOL/L (ref 0.4–2)
NT-PROBNP SERPL-MCNC: 686 PG/ML (ref 0–449)
PCO2 BLDA: 41.6 MM HG (ref 35–45)
PCO2 BLDA: 44.9 MM HG (ref 35–45)
PCO2 BLDA: 48.4 MM HG (ref 35–45)
PERFORMED ON: ABNORMAL
PH BLDA: 7.29 [PH] (ref 7.35–7.45)
PH BLDA: 7.33 [PH] (ref 7.35–7.45)
PH BLDA: 7.33 [PH] (ref 7.35–7.45)
PO2 BLDA: 383.7 MM HG (ref 75–108)
PO2 BLDA: 388.2 MM HG (ref 75–108)
PO2 BLDA: 400.4 MM HG (ref 75–108)
POC SAMPLE TYPE: ABNORMAL
POTASSIUM BLD-SCNC: 3.7 MMOL/L (ref 3.5–5.1)
POTASSIUM BLD-SCNC: 3.7 MMOL/L (ref 3.5–5.1)
POTASSIUM BLD-SCNC: 3.8 MMOL/L (ref 3.5–5.1)
SAO2 % BLDA: 100 % (ref 93–100)
SODIUM BLD-SCNC: 138 MMOL/L (ref 136–145)
SODIUM BLD-SCNC: 138 MMOL/L (ref 136–145)
SODIUM BLD-SCNC: 139 MMOL/L (ref 136–145)
TROPONIN, HIGH SENSITIVITY: 20 NG/L (ref 0–22)

## 2025-04-01 PROCEDURE — 6370000000 HC RX 637 (ALT 250 FOR IP): Performed by: INTERNAL MEDICINE

## 2025-04-01 PROCEDURE — 6360000002 HC RX W HCPCS: Performed by: INTERNAL MEDICINE

## 2025-04-01 PROCEDURE — 2580000003 HC RX 258: Performed by: INTERNAL MEDICINE

## 2025-04-01 PROCEDURE — 82947 ASSAY GLUCOSE BLOOD QUANT: CPT

## 2025-04-01 PROCEDURE — 93005 ELECTROCARDIOGRAM TRACING: CPT | Performed by: INTERNAL MEDICINE

## 2025-04-01 PROCEDURE — 84295 ASSAY OF SERUM SODIUM: CPT

## 2025-04-01 PROCEDURE — 85347 COAGULATION TIME ACTIVATED: CPT

## 2025-04-01 PROCEDURE — 84132 ASSAY OF SERUM POTASSIUM: CPT

## 2025-04-01 PROCEDURE — 85014 HEMATOCRIT: CPT

## 2025-04-01 PROCEDURE — C1760 CLOSURE DEV, VASC: HCPCS | Performed by: INTERNAL MEDICINE

## 2025-04-01 PROCEDURE — C1889 IMPLANT/INSERT DEVICE, NOC: HCPCS | Performed by: INTERNAL MEDICINE

## 2025-04-01 PROCEDURE — 3700000001 HC ADD 15 MINUTES (ANESTHESIA): Performed by: INTERNAL MEDICINE

## 2025-04-01 PROCEDURE — 33361 REPLACE AORTIC VALVE PERQ: CPT | Performed by: INTERNAL MEDICINE

## 2025-04-01 PROCEDURE — 84484 ASSAY OF TROPONIN QUANT: CPT

## 2025-04-01 PROCEDURE — 86923 COMPATIBILITY TEST ELECTRIC: CPT

## 2025-04-01 PROCEDURE — 83605 ASSAY OF LACTIC ACID: CPT

## 2025-04-01 PROCEDURE — 3700000000 HC ANESTHESIA ATTENDED CARE: Performed by: INTERNAL MEDICINE

## 2025-04-01 PROCEDURE — 33361 REPLACE AORTIC VALVE PERQ: CPT | Performed by: STUDENT IN AN ORGANIZED HEALTH CARE EDUCATION/TRAINING PROGRAM

## 2025-04-01 PROCEDURE — 82330 ASSAY OF CALCIUM: CPT

## 2025-04-01 PROCEDURE — 7100000011 HC PHASE II RECOVERY - ADDTL 15 MIN: Performed by: INTERNAL MEDICINE

## 2025-04-01 PROCEDURE — 82803 BLOOD GASES ANY COMBINATION: CPT

## 2025-04-01 PROCEDURE — 2060000000 HC ICU INTERMEDIATE R&B

## 2025-04-01 PROCEDURE — 51798 US URINE CAPACITY MEASURE: CPT

## 2025-04-01 PROCEDURE — 6360000002 HC RX W HCPCS

## 2025-04-01 PROCEDURE — 94150 VITAL CAPACITY TEST: CPT

## 2025-04-01 PROCEDURE — 2709999900 HC NON-CHARGEABLE SUPPLY: Performed by: INTERNAL MEDICINE

## 2025-04-01 PROCEDURE — 1200000000 HC SEMI PRIVATE

## 2025-04-01 PROCEDURE — 83880 ASSAY OF NATRIURETIC PEPTIDE: CPT

## 2025-04-01 PROCEDURE — 36415 COLL VENOUS BLD VENIPUNCTURE: CPT

## 2025-04-01 PROCEDURE — 86850 RBC ANTIBODY SCREEN: CPT

## 2025-04-01 PROCEDURE — 2500000003 HC RX 250 WO HCPCS: Performed by: INTERNAL MEDICINE

## 2025-04-01 PROCEDURE — 86900 BLOOD TYPING SEROLOGIC ABO: CPT

## 2025-04-01 PROCEDURE — 6360000004 HC RX CONTRAST MEDICATION: Performed by: INTERNAL MEDICINE

## 2025-04-01 PROCEDURE — 02RF38N REPLACEMENT OF AORTIC VALVE WITH ZOOPLASTIC TISSUE, USING RAPID DEPLOYMENT TECHNIQUE, PERCUTANEOUS APPROACH: ICD-10-PCS | Performed by: INTERNAL MEDICINE

## 2025-04-01 PROCEDURE — 86901 BLOOD TYPING SEROLOGIC RH(D): CPT

## 2025-04-01 PROCEDURE — C1769 GUIDE WIRE: HCPCS | Performed by: INTERNAL MEDICINE

## 2025-04-01 PROCEDURE — 7100000010 HC PHASE II RECOVERY - FIRST 15 MIN: Performed by: INTERNAL MEDICINE

## 2025-04-01 PROCEDURE — C1894 INTRO/SHEATH, NON-LASER: HCPCS | Performed by: INTERNAL MEDICINE

## 2025-04-01 DEVICE — SAPIEN 3 ULTRA RESILIA TRANSCATHETER HEART VALVE, 26MM
Type: IMPLANTABLE DEVICE | Status: FUNCTIONAL
Brand: SAPIEN 3 ULTRA RESILIA

## 2025-04-01 RX ORDER — SODIUM CHLORIDE 9 MG/ML
INJECTION, SOLUTION INTRAVENOUS PRN
Status: COMPLETED | OUTPATIENT
Start: 2025-04-01 | End: 2025-04-01

## 2025-04-01 RX ORDER — GLUCAGON 1 MG/ML
1 KIT INJECTION PRN
Status: DISCONTINUED | OUTPATIENT
Start: 2025-04-01 | End: 2025-04-02 | Stop reason: HOSPADM

## 2025-04-01 RX ORDER — DEXTROSE MONOHYDRATE 100 MG/ML
INJECTION, SOLUTION INTRAVENOUS CONTINUOUS PRN
Status: DISCONTINUED | OUTPATIENT
Start: 2025-04-01 | End: 2025-04-02 | Stop reason: HOSPADM

## 2025-04-01 RX ORDER — ONDANSETRON 2 MG/ML
4 INJECTION INTRAMUSCULAR; INTRAVENOUS EVERY 6 HOURS PRN
Status: DISCONTINUED | OUTPATIENT
Start: 2025-04-01 | End: 2025-04-02 | Stop reason: HOSPADM

## 2025-04-01 RX ORDER — TRAZODONE HYDROCHLORIDE 50 MG/1
100 TABLET ORAL NIGHTLY PRN
Status: DISCONTINUED | OUTPATIENT
Start: 2025-04-01 | End: 2025-04-02 | Stop reason: HOSPADM

## 2025-04-01 RX ORDER — LIDOCAINE HYDROCHLORIDE 20 MG/ML
INJECTION, SOLUTION INFILTRATION; PERINEURAL
Status: DISCONTINUED | OUTPATIENT
Start: 2025-04-01 | End: 2025-04-01 | Stop reason: SDUPTHER

## 2025-04-01 RX ORDER — ALLOPURINOL 300 MG/1
300 TABLET ORAL NIGHTLY
Status: DISCONTINUED | OUTPATIENT
Start: 2025-04-01 | End: 2025-04-02 | Stop reason: HOSPADM

## 2025-04-01 RX ORDER — TAMSULOSIN HYDROCHLORIDE 0.4 MG/1
0.4 CAPSULE ORAL NIGHTLY
Status: DISCONTINUED | OUTPATIENT
Start: 2025-04-01 | End: 2025-04-02 | Stop reason: HOSPADM

## 2025-04-01 RX ORDER — PROTAMINE SULFATE 10 MG/ML
INJECTION, SOLUTION INTRAVENOUS
Status: DISCONTINUED | OUTPATIENT
Start: 2025-04-01 | End: 2025-04-01 | Stop reason: SDUPTHER

## 2025-04-01 RX ORDER — CLOPIDOGREL BISULFATE 75 MG/1
75 TABLET ORAL DAILY
Status: DISCONTINUED | OUTPATIENT
Start: 2025-04-01 | End: 2025-04-02 | Stop reason: HOSPADM

## 2025-04-01 RX ORDER — NITROGLYCERIN 20 MG/100ML
INJECTION INTRAVENOUS
Status: DISCONTINUED | OUTPATIENT
Start: 2025-04-01 | End: 2025-04-01 | Stop reason: SDUPTHER

## 2025-04-01 RX ORDER — METOPROLOL TARTRATE 25 MG/1
25 TABLET, FILM COATED ORAL 2 TIMES DAILY
Status: DISCONTINUED | OUTPATIENT
Start: 2025-04-01 | End: 2025-04-02 | Stop reason: HOSPADM

## 2025-04-01 RX ORDER — SODIUM CHLORIDE 0.9 % (FLUSH) 0.9 %
5-40 SYRINGE (ML) INJECTION EVERY 12 HOURS SCHEDULED
Status: DISCONTINUED | OUTPATIENT
Start: 2025-04-01 | End: 2025-04-02 | Stop reason: HOSPADM

## 2025-04-01 RX ORDER — PROPOFOL 10 MG/ML
INJECTION, EMULSION INTRAVENOUS
Status: DISCONTINUED | OUTPATIENT
Start: 2025-04-01 | End: 2025-04-01 | Stop reason: SDUPTHER

## 2025-04-01 RX ORDER — ACETAMINOPHEN 325 MG/1
650 TABLET ORAL EVERY 4 HOURS PRN
Status: DISCONTINUED | OUTPATIENT
Start: 2025-04-01 | End: 2025-04-02 | Stop reason: HOSPADM

## 2025-04-01 RX ORDER — IPRATROPIUM BROMIDE 21 UG/1
2 SPRAY, METERED NASAL EVERY 12 HOURS
Status: DISCONTINUED | OUTPATIENT
Start: 2025-04-01 | End: 2025-04-02 | Stop reason: HOSPADM

## 2025-04-01 RX ORDER — GEMFIBROZIL 600 MG/1
600 TABLET, FILM COATED ORAL
Status: DISCONTINUED | OUTPATIENT
Start: 2025-04-01 | End: 2025-04-02 | Stop reason: HOSPADM

## 2025-04-01 RX ORDER — ATROPINE SULFATE 1 MG/ML
0.5 INJECTION, SOLUTION INTRAVENOUS
Status: DISCONTINUED | OUTPATIENT
Start: 2025-04-01 | End: 2025-04-02 | Stop reason: HOSPADM

## 2025-04-01 RX ORDER — LIDOCAINE 4 G/G
1 PATCH TOPICAL ONCE
Status: DISCONTINUED | OUTPATIENT
Start: 2025-04-01 | End: 2025-04-01

## 2025-04-01 RX ORDER — DOCUSATE SODIUM 100 MG/1
100 CAPSULE, LIQUID FILLED ORAL DAILY
Status: DISCONTINUED | OUTPATIENT
Start: 2025-04-01 | End: 2025-04-02 | Stop reason: HOSPADM

## 2025-04-01 RX ORDER — ONDANSETRON 4 MG/1
4 TABLET, ORALLY DISINTEGRATING ORAL EVERY 8 HOURS PRN
Status: DISCONTINUED | OUTPATIENT
Start: 2025-04-01 | End: 2025-04-02 | Stop reason: HOSPADM

## 2025-04-01 RX ORDER — HYDRALAZINE HYDROCHLORIDE 20 MG/ML
10 INJECTION INTRAMUSCULAR; INTRAVENOUS EVERY 6 HOURS PRN
Status: DISCONTINUED | OUTPATIENT
Start: 2025-04-01 | End: 2025-04-02 | Stop reason: HOSPADM

## 2025-04-01 RX ORDER — ASPIRIN 81 MG/1
81 TABLET ORAL DAILY
Status: DISCONTINUED | OUTPATIENT
Start: 2025-04-01 | End: 2025-04-02 | Stop reason: HOSPADM

## 2025-04-01 RX ORDER — DOFETILIDE 0.12 MG/1
125 CAPSULE ORAL EVERY 12 HOURS SCHEDULED
Status: DISCONTINUED | OUTPATIENT
Start: 2025-04-01 | End: 2025-04-01

## 2025-04-01 RX ORDER — 0.9 % SODIUM CHLORIDE 0.9 %
500 INTRAVENOUS SOLUTION INTRAVENOUS PRN
Status: DISCONTINUED | OUTPATIENT
Start: 2025-04-01 | End: 2025-04-02 | Stop reason: HOSPADM

## 2025-04-01 RX ORDER — DOFETILIDE 0.12 MG/1
125 CAPSULE ORAL EVERY 12 HOURS SCHEDULED
Status: DISCONTINUED | OUTPATIENT
Start: 2025-04-02 | End: 2025-04-02

## 2025-04-01 RX ORDER — MAGNESIUM OXIDE 400 MG/1
400 TABLET ORAL DAILY
Status: DISCONTINUED | OUTPATIENT
Start: 2025-04-01 | End: 2025-04-02

## 2025-04-01 RX ORDER — LIDOCAINE HYDROCHLORIDE 10 MG/ML
INJECTION, SOLUTION INFILTRATION; PERINEURAL PRN
Status: DISCONTINUED | OUTPATIENT
Start: 2025-04-01 | End: 2025-04-01 | Stop reason: HOSPADM

## 2025-04-01 RX ORDER — INSULIN LISPRO 100 [IU]/ML
0-4 INJECTION, SOLUTION INTRAVENOUS; SUBCUTANEOUS
Status: DISCONTINUED | OUTPATIENT
Start: 2025-04-01 | End: 2025-04-02 | Stop reason: HOSPADM

## 2025-04-01 RX ORDER — ATORVASTATIN CALCIUM 40 MG/1
40 TABLET, FILM COATED ORAL NIGHTLY
Status: DISCONTINUED | OUTPATIENT
Start: 2025-04-01 | End: 2025-04-02 | Stop reason: HOSPADM

## 2025-04-01 RX ORDER — LEVOTHYROXINE SODIUM 125 UG/1
125 TABLET ORAL
Status: DISCONTINUED | OUTPATIENT
Start: 2025-04-01 | End: 2025-04-02 | Stop reason: HOSPADM

## 2025-04-01 RX ORDER — METOPROLOL TARTRATE 50 MG
50 TABLET ORAL 2 TIMES DAILY
Status: DISCONTINUED | OUTPATIENT
Start: 2025-04-01 | End: 2025-04-02 | Stop reason: HOSPADM

## 2025-04-01 RX ORDER — SODIUM CHLORIDE 0.9 % (FLUSH) 0.9 %
5-40 SYRINGE (ML) INJECTION PRN
Status: DISCONTINUED | OUTPATIENT
Start: 2025-04-01 | End: 2025-04-02 | Stop reason: HOSPADM

## 2025-04-01 RX ORDER — HEPARIN SODIUM 1000 [USP'U]/ML
INJECTION, SOLUTION INTRAVENOUS; SUBCUTANEOUS
Status: DISCONTINUED | OUTPATIENT
Start: 2025-04-01 | End: 2025-04-01 | Stop reason: SDUPTHER

## 2025-04-01 RX ORDER — IOPAMIDOL 755 MG/ML
INJECTION, SOLUTION INTRAVASCULAR PRN
Status: DISCONTINUED | OUTPATIENT
Start: 2025-04-01 | End: 2025-04-01 | Stop reason: HOSPADM

## 2025-04-01 RX ORDER — SODIUM CHLORIDE 9 MG/ML
INJECTION, SOLUTION INTRAVENOUS PRN
Status: DISCONTINUED | OUTPATIENT
Start: 2025-04-01 | End: 2025-04-02 | Stop reason: HOSPADM

## 2025-04-01 RX ADMIN — SODIUM CHLORIDE: 9 INJECTION, SOLUTION INTRAVENOUS at 09:01

## 2025-04-01 RX ADMIN — GEMFIBROZIL 600 MG: 600 TABLET ORAL at 15:40

## 2025-04-01 RX ADMIN — ATORVASTATIN CALCIUM 40 MG: 40 TABLET, FILM COATED ORAL at 21:27

## 2025-04-01 RX ADMIN — HEPARIN SODIUM 8000 UNITS: 1000 INJECTION INTRAVENOUS; SUBCUTANEOUS at 09:56

## 2025-04-01 RX ADMIN — CLOPIDOGREL BISULFATE 75 MG: 75 TABLET, FILM COATED ORAL at 16:57

## 2025-04-01 RX ADMIN — CEFAZOLIN 2000 MG: 2 INJECTION, POWDER, FOR SOLUTION INTRAMUSCULAR; INTRAVENOUS at 09:11

## 2025-04-01 RX ADMIN — LIDOCAINE HYDROCHLORIDE 100 MG: 20 INJECTION, SOLUTION INFILTRATION; PERINEURAL at 09:08

## 2025-04-01 RX ADMIN — ALLOPURINOL 300 MG: 300 TABLET ORAL at 21:24

## 2025-04-01 RX ADMIN — PHENYLEPHRINE HYDROCHLORIDE 25 MCG/MIN: 50 INJECTION INTRAVENOUS at 09:08

## 2025-04-01 RX ADMIN — METOPROLOL TARTRATE 50 MG: 50 TABLET, FILM COATED ORAL at 21:24

## 2025-04-01 RX ADMIN — PROPOFOL 100 MCG/KG/MIN: 10 INJECTION, EMULSION INTRAVENOUS at 09:08

## 2025-04-01 RX ADMIN — SODIUM CHLORIDE, PRESERVATIVE FREE 10 ML: 5 INJECTION INTRAVENOUS at 21:25

## 2025-04-01 RX ADMIN — ASPIRIN 81 MG: 81 TABLET, COATED ORAL at 16:57

## 2025-04-01 RX ADMIN — HYDRALAZINE HYDROCHLORIDE 10 MG: 20 INJECTION INTRAMUSCULAR; INTRAVENOUS at 12:25

## 2025-04-01 RX ADMIN — TAMSULOSIN HYDROCHLORIDE 0.4 MG: 0.4 CAPSULE ORAL at 21:24

## 2025-04-01 RX ADMIN — METOPROLOL TARTRATE 25 MG: 50 TABLET, FILM COATED ORAL at 22:10

## 2025-04-01 RX ADMIN — PROTAMINE SULFATE 50 MG: 10 INJECTION, SOLUTION INTRAVENOUS at 10:12

## 2025-04-01 RX ADMIN — DOCUSATE SODIUM 100 MG: 100 CAPSULE, LIQUID FILLED ORAL at 15:40

## 2025-04-01 RX ADMIN — NITROGLYCERIN 50 MCG/MIN: 20 INJECTION INTRAVENOUS at 09:59

## 2025-04-01 RX ADMIN — INSULIN LISPRO 1 UNITS: 100 INJECTION, SOLUTION INTRAVENOUS; SUBCUTANEOUS at 17:04

## 2025-04-01 RX ADMIN — INSULIN LISPRO 2 UNITS: 100 INJECTION, SOLUTION INTRAVENOUS; SUBCUTANEOUS at 21:25

## 2025-04-01 ASSESSMENT — PAIN SCALES - GENERAL
PAINLEVEL_OUTOF10: 0

## 2025-04-01 ASSESSMENT — ENCOUNTER SYMPTOMS: SHORTNESS OF BREATH: 1

## 2025-04-01 ASSESSMENT — PAIN - FUNCTIONAL ASSESSMENT: PAIN_FUNCTIONAL_ASSESSMENT: 0-10

## 2025-04-01 NOTE — ANESTHESIA PRE PROCEDURE
Department of Anesthesiology  Preprocedure Note       Name:  Panchito Arnold   Age:  79 y.o.  :  1945                                          MRN:  [id number]         Date:  2025      Surgeon: Surgeon(s):  Antoine Gil MD Verma, Anil, MD Tugulan, Carmen I, MD    Procedure: Procedure(s):  Transcatheter aortic valve replacement percutaneous femoral artery approach  TRANSCATHETER AORTIC VALVE REPLACEMENT PERCUTANEOUS FEMORAL APPROACH    Medications prior to admission:   Prior to Admission medications    Medication Sig Start Date End Date Taking? Authorizing Provider   rivaroxaban (XARELTO) 20 MG TABS tablet Take 1 tablet by mouth daily (with breakfast) 25  Yes Lee Anton MD   furosemide (LASIX) 20 MG tablet TAKE 3 TABLETS BY MOUTH DAILY 24  Yes Lee Anton MD   metoprolol tartrate (LOPRESSOR) 25 MG tablet Take 1 tablet by mouth 2 times daily Along with 50 mg BID to equal 75 mg BID 24  Yes Gabi Young APRN - CNP   solifenacin (VESICARE) 5 MG tablet Take 1 tablet by mouth as needed   Yes ProviderWilian MD   metoprolol tartrate (LOPRESSOR) 50 MG tablet Take 1 tablet by mouth 2 times daily 10/24/24  Yes Lee Anton MD   dofetilide (TIKOSYN) 125 MCG capsule Take 1 capsule by mouth every 12 hours 24  Yes Gino Gilliam MD   magnesium oxide (MAG-OX) 400 MG tablet Take 1 tablet by mouth daily 10/12/23  Yes Gabi Young APRN - CNP   nitroGLYCERIN (NITROSTAT) 0.4 MG SL tablet Place 1 tablet under the tongue every 5 minutes as needed for Chest pain 2/10/23  Yes Nathen Cameron MD   Multiple Vitamins-Minerals (PRESERVISION AREDS PO) Take 1 capsule by mouth 2 times daily   Yes Wilian Willis MD   ipratropium (ATROVENT) 0.03 % nasal spray 2 sprays by Each Nostril route in the morning and 2 sprays in the evening.   Yes Wilian Willis MD   tamsulosin (FLOMAX) 0.4 MG capsule Take 1 capsule by mouth nightly   Yes Lazaro

## 2025-04-01 NOTE — PROGRESS NOTES
Incentive Spirometry education and demonstration completed by Respiratory Therapy Yes      Response to education: Very Good     Teaching Time: 5 minutes    Minimum Predicted Vital Capacity - 684 mL.  Patient's Actual Vital Capacity - 1500 mL. Turning over to Nursing for routine follow-up Yes.    Comments:     Electronically signed by DALLIN LUIS RCP on 4/1/2025 at 5:27 PM

## 2025-04-01 NOTE — OP NOTE
Cardiothoracic TAVR Operative Report    Patient Name: Panchito Arnold  Patient MRN: [id number]  Patient : 1945    Service date: 2025     1: Luis Armando Spence MD   2: Ashwini Mackey MD  CRNA: Eldon Castellanos APRN - CRNA     Pre-operative diagnosis:   1. Severe symptomatic aortic stenosis  2. Coronary artery disease, s/p MI and PCI   3. Heart failure with preserved ejection fraction, EF 65%  4. Atrial fibrillation on Xarelto  5. SSS s/p PPM  6. BRAD (wears CPAP)  7. Oral med controlled T2DM A1C 7.0  8. HTN  9. HLD     Post-operative diagnosis:  Same    Procedure/description:  1. Transcatheter aortic valve replacement with a 26mm valve via right transfemoral approach; CPT code 83705    Findings:  1. Transfemoral access obtained percutaneously without difficulty  2. Post-operatively we noted no echocardiographic evidence of perivalvular leak    Implants:  26mm S3 Diaz Bovine Pericardial Tissue valve serial #: [id number]    Implant Name Type Inv. Item Serial No.  Lot No. LRB No. Used Action   VALVE AORTIC TRANSCATHETER HEART 26MM RODRÍGUEZ 3 ULTRA RESILIA - [id number] Aortic valves VALVE AORTIC TRANSCATHETER HEART 26MM RODRÍGUEZ 3 ULTRA RESILIA [id number] [employer]-  N/A 1 Implanted        Estimated Blood Loss:  Minimal    Drains:  None    Wires:  None    Complications:  None    Condition of Patient at Transfer:  stable    Operative Indications:  Panchito Arnold is a 79 y.o. male with the co-morbidities documented above, who noted progressive shortness of breath and dyspnea on exertion. Echocardiography demonstrated severe aortic stenosis with a relatively preserved ejection fraction. He underwent coronary angiography by Dr. Spence which demonstrated non-obstructive CAD of 40% LM LAD. Preoperative CT angiography demonstrated appropriate size of the right ileo-femoral system for transfemoral access, and evaluation of the aortic root suggested that it could accommodate a 26mm valve.

## 2025-04-01 NOTE — PROGRESS NOTES
Pt verified information regarding surgery, name, birth date, surgeon, procedure and allergies: emycin, amlodipine.  Appropriate antibiotics ordered: ancef. Beta blocker: metoprolol at home. Lab work within normal limits, 4 units of RBC's on call to OR, vital signs stable, Mepilex sacral border applied and 2% chlorhexidine gluconate skin prep given. Patient and family educated about surgery and pain management. To surgery per bed.

## 2025-04-01 NOTE — TELEPHONE ENCOUNTER
Nilsa, can you coordinate an echo with pts schedule OV with Dr Anton at  office on 4/28/25 for his 1 month post TAVR FU? No need to call pt. Ness FUNES

## 2025-04-01 NOTE — ANESTHESIA POSTPROCEDURE EVALUATION
Department of Anesthesiology  Postprocedure Note    Patient: Panchito Arnold  MRN: [id number]  YOB: 1945  Date of evaluation: 4/1/2025    Procedure Summary       Date: 04/01/25 Room / Location: [facility] CATH/HYBRID LAB [address] [place] [address] CARDIAC CATH LAB    Anesthesia Start: 0901 Anesthesia Stop: 1032    Procedures:       Transcatheter aortic valve replacement percutaneous femoral artery approach      TRANSCATHETER AORTIC VALVE REPLACEMENT PERCUTANEOUS FEMORAL APPROACH Diagnosis:       Aortic valve stenosis, etiology of cardiac valve disease unspecified      (Aortic valve stenosis, etiology of cardiac valve disease unspecified [I35.0])    Providers: Luis Armando Spence MD; Ashwini Mackey MD Responsible Provider:     Anesthesia Type: MAC ASA Status: 4            Anesthesia Type: No value filed.    Grupo Phase I: Grupo Score: 10    Grupo Phase II:      Anesthesia Post Evaluation    Patient location during evaluation: PACU  Patient participation: complete - patient participated  Level of consciousness: awake and alert  Pain score: 0  Airway patency: patent  Nausea & Vomiting: no nausea and no vomiting  Cardiovascular status: blood pressure returned to baseline and hemodynamically stable  Respiratory status: acceptable and nasal cannula  Hydration status: euvolemic  Pain management: adequate    No notable events documented.

## 2025-04-01 NOTE — PROGRESS NOTES
TAVR Chart Clarifications    [place] Questionnaire (Symptoms Over Last 2 Weeks)     CHF limiting activity  Activity Extremely Limited Quite a bit Limited Moderately Limited Slightly Limited Not Limited NA or Other   Bathing [] [] [] [x] [] []   Walking 1 blk [] [] [] [x] [] []   Hurrying/jogging [] [] [] [x] [] []     Swelling in feet, ankles, legs  Every Morning 3X per wk 1-2X per wk <1 per wk Never over 2 wks   [] [] [] [] [x]     Fatigue limited activity  All of time Several /day >1 per day 3X per wk 1-2 per wk <1 per wk None in 2 wks   [] [] [] [] [x] [] []     Shortness of breath limiting activity  All of time Several/day >1 per day >3X per wk 1-2X per wk <1 per wk None in 2 wks   [] [] [] [] [] [x] []     Shortness of breath necessitating sleep in chair or with 3 or more pillows  Every night >3X per wk 1-2X per week <1 per week None in 2 wks   [] [] [] [] [x]     CHF limited enjoyment of life  Extremely limited Quite limited Moderately limited Slightly limited Not limited   [] [] [] [x] []     Feeling about spending rest of life with current heart failure symptoms  Not satisfied Mostly dissatisfied Somewhat satisfied Mostly satisfied Completely satisfied   [] [x] [] [] []     CHF altered lifestyle  Activity Severely Limited Quite a bit Limited Moderately Limited Slightly Limited Not Limited NA or Other   Hobbies, Rec [] [] [x] [] [] []   Working, Chores [] [] [] [x] [] []   Visiting family [] [] [] [] [x] []     Cardiac Rehab     [x] Educated patient and/or family on the importance of attending cardiac rehab post procedure.   [x] Educational flyer provided.     [x] Cardiology RN notified to place outpatient orders.      5 Meter Walk Test     Time #1 Time #2 Time #3 Unable to Walk   5.60 5.5 5.6 []     STS Score     Mortality (%)   3.0

## 2025-04-01 NOTE — FLOWSHEET NOTE
Flores inserted after hematoma noted in left groin.  Despite previous bladder scan amount flores drained 500cc.  Pressure held and site stable.  Slight bruise noted.

## 2025-04-01 NOTE — H&P
Reason for Referral: Aortic stenosis     Referring physician: Lee Anton MD     CC: \"I am ready to proceed with the procedure.\"        Subjective:      History of Present Illness:     Panchito Arnold is a 79 y.o. patient with a PMH significant for atrial fibrillation, aortic stenosis, coronary artery disease, chronic diastolic heart failure, hypertension, sick sinus syndrome and diabetes.      Today, he is here to discuss TAVR procedure. He is ready to proceed. He continues to have shortness of breath with exertion and intermittent dizziness. Patient denies exertional chest pain/pressure, dyspnea at rest, PND, orthopnea, palpitations,  weight changes, changes in LE edema, and syncope.      Past Medical History:   has a past medical history of Aortic stenosis, Atrial fibrillation (HCC), CAD (coronary artery disease), native coronary artery, Diabetes mellitus (HCC), Diastolic congestive heart failure (HCC), Essential hypertension, Gout, Hypothyroid, Left carotid bruit, Obstructive sleep apnea, Peripheral neuropathy, Pneumonia, and Sick sinus syndrome (HCC).     Surgical History:   has a past surgical history that includes Diagnostic Cardiac Cath Lab Procedure (12/17/07); Thyroidectomy (2011); Cholecystectomy, open (1993); Total knee arthroplasty (Right, 3/12/14); Pacemaker insertion (02/2017); Coronary angioplasty (12/17/07); and Cardiac procedure (N/A, 1/29/2025).      Social History:   reports that he has never smoked. He has never used smokeless tobacco. He reports that he does not drink alcohol and does not use drugs.      Family History:  family history includes Heart Attack in his maternal grandfather; Heart Defect in his maternal grandfather and mother; Heart Disease in his father; Heart Surgery in his father; Pacemaker in his mother.     Home Medications:  Were reviewed and are listed in nursing record and/or below  Home Medications           Prior to Admission medications    Medication Sig Start Date

## 2025-04-02 ENCOUNTER — APPOINTMENT (OUTPATIENT)
Age: 80
DRG: 267 | End: 2025-04-02
Attending: INTERNAL MEDICINE
Payer: MEDICARE

## 2025-04-02 VITALS
RESPIRATION RATE: 16 BRPM | BODY MASS INDEX: 25.31 KG/M2 | OXYGEN SATURATION: 98 % | TEMPERATURE: 98.6 F | HEART RATE: 61 BPM | DIASTOLIC BLOOD PRESSURE: 66 MMHG | HEIGHT: 68 IN | SYSTOLIC BLOOD PRESSURE: 135 MMHG | WEIGHT: 167 LBS

## 2025-04-02 LAB
ANION GAP SERPL CALCULATED.3IONS-SCNC: 13 MMOL/L (ref 3–16)
BUN SERPL-MCNC: 23 MG/DL (ref 7–20)
CALCIUM SERPL-MCNC: 8.7 MG/DL (ref 8.3–10.6)
CHLORIDE SERPL-SCNC: 101 MMOL/L (ref 99–110)
CO2 SERPL-SCNC: 21 MMOL/L (ref 21–32)
CREAT SERPL-MCNC: 0.9 MG/DL (ref 0.8–1.3)
DEPRECATED RDW RBC AUTO: 16.6 % (ref 12.4–15.4)
ECHO AO ASC DIAM: 4 CM
ECHO AO ASCENDING AORTA INDEX: 2.12 CM/M2
ECHO AV ACCELERATION TIME: 63 MS
ECHO AV AREA PEAK VELOCITY: 2.8 CM2
ECHO AV AREA VTI: 3.6 CM2
ECHO AV AREA/BSA PEAK VELOCITY: 1.5 CM2/M2
ECHO AV AREA/BSA VTI: 1.9 CM2/M2
ECHO AV MEAN GRADIENT: 5 MMHG
ECHO AV MEAN VELOCITY: 1 M/S
ECHO AV PEAK GRADIENT: 9 MMHG
ECHO AV PEAK VELOCITY: 1.5 M/S
ECHO AV VELOCITY RATIO: 0.67
ECHO AV VTI: 34.4 CM
ECHO BSA: 1.91 M2
ECHO BSA: 1.92 M2
ECHO IVC INSP: 0.3 CM
ECHO IVC PROX: 1.4 CM
ECHO LA AREA 2C: 23.7 CM2
ECHO LA AREA 4C: 22.1 CM2
ECHO LA MAJOR AXIS: 6.1 CM
ECHO LA MINOR AXIS: 6 CM
ECHO LA VOL BP: 69 ML (ref 18–58)
ECHO LA VOL MOD A2C: 75 ML (ref 18–58)
ECHO LA VOL MOD A4C: 62 ML (ref 18–58)
ECHO LA VOL/BSA BIPLANE: 37 ML/M2 (ref 16–34)
ECHO LA VOLUME INDEX MOD A2C: 40 ML/M2 (ref 16–34)
ECHO LA VOLUME INDEX MOD A4C: 33 ML/M2 (ref 16–34)
ECHO LV E' LATERAL VELOCITY: 6.73 CM/S
ECHO LV E' SEPTAL VELOCITY: 6.46 CM/S
ECHO LV EDV A2C: 144 ML
ECHO LV EDV A4C: 127 ML
ECHO LV EDV INDEX A4C: 67 ML/M2
ECHO LV EDV NDEX A2C: 76 ML/M2
ECHO LV EF PHYSICIAN: 70 %
ECHO LV EJECTION FRACTION A2C: 76 %
ECHO LV EJECTION FRACTION A4C: 79 %
ECHO LV EJECTION FRACTION BIPLANE: 77 % (ref 55–100)
ECHO LV ESV A2C: 35 ML
ECHO LV ESV A4C: 27 ML
ECHO LV ESV INDEX A2C: 19 ML/M2
ECHO LV ESV INDEX A4C: 14 ML/M2
ECHO LV FRACTIONAL SHORTENING: 44 % (ref 28–44)
ECHO LV INTERNAL DIMENSION DIASTOLE INDEX: 2.38 CM/M2
ECHO LV INTERNAL DIMENSION DIASTOLIC: 4.5 CM (ref 4.2–5.9)
ECHO LV INTERNAL DIMENSION SYSTOLIC INDEX: 1.32 CM/M2
ECHO LV INTERNAL DIMENSION SYSTOLIC: 2.5 CM
ECHO LV IVSD: 1 CM (ref 0.6–1)
ECHO LV MASS 2D: 175 G (ref 88–224)
ECHO LV MASS INDEX 2D: 92.6 G/M2 (ref 49–115)
ECHO LV POSTERIOR WALL DIASTOLIC: 1.2 CM (ref 0.6–1)
ECHO LV RELATIVE WALL THICKNESS RATIO: 0.53
ECHO LVOT AREA: 4.2 CM2
ECHO LVOT AV VTI INDEX: 0.87
ECHO LVOT DIAM: 2.3 CM
ECHO LVOT MEAN GRADIENT: 2 MMHG
ECHO LVOT PEAK GRADIENT: 4 MMHG
ECHO LVOT PEAK VELOCITY: 1 M/S
ECHO LVOT STROKE VOLUME INDEX: 65.9 ML/M2
ECHO LVOT SV: 124.6 ML
ECHO LVOT VTI: 30 CM
ECHO MV A VELOCITY: 0.92 M/S
ECHO MV AREA VTI: 2.5 CM2
ECHO MV E DECELERATION TIME (DT): 201 MS
ECHO MV E VELOCITY: 1.2 M/S
ECHO MV E/A RATIO: 1.3
ECHO MV E/E' LATERAL: 17.83
ECHO MV E/E' RATIO (AVERAGED): 18.2
ECHO MV E/E' SEPTAL: 18.58
ECHO MV LVOT VTI INDEX: 1.66
ECHO MV MAX VELOCITY: 1.4 M/S
ECHO MV MEAN GRADIENT: 3 MMHG
ECHO MV MEAN VELOCITY: 0.8 M/S
ECHO MV PEAK GRADIENT: 8 MMHG
ECHO MV VTI: 49.9 CM
ECHO PV MAX VELOCITY: 0.8 M/S
ECHO PV PEAK GRADIENT: 2 MMHG
ECHO RA AREA 4C: 14.9 CM2
ECHO RA END SYSTOLIC VOLUME APICAL 4 CHAMBER INDEX BSA: 15 ML/M2
ECHO RA VOLUME: 29 ML
ECHO RV BASAL DIMENSION: 3.6 CM
ECHO RV FREE WALL PEAK S': 8.9 CM/S
ECHO RV LONGITUDINAL DIMENSION: 7.5 CM
ECHO RV MID DIMENSION: 2.9 CM
ECHO RV TAPSE: 2.4 CM (ref 1.7–?)
ECHO TV REGURGITANT MAX VELOCITY: 2.9 M/S
ECHO TV REGURGITANT PEAK GRADIENT: 34 MMHG
GFR SERPLBLD CREATININE-BSD FMLA CKD-EPI: 85 ML/MIN/{1.73_M2}
GLUCOSE BLD-MCNC: 134 MG/DL (ref 70–99)
GLUCOSE BLD-MCNC: 156 MG/DL (ref 70–99)
GLUCOSE BLD-MCNC: 240 MG/DL (ref 70–99)
GLUCOSE SERPL-MCNC: 118 MG/DL (ref 70–99)
HCT VFR BLD AUTO: 29.3 % (ref 40.5–52.5)
HGB BLD-MCNC: 9.9 G/DL (ref 13.5–17.5)
MCH RBC QN AUTO: 28.8 PG (ref 26–34)
MCHC RBC AUTO-ENTMCNC: 33.7 G/DL (ref 31–36)
MCV RBC AUTO: 85.5 FL (ref 80–100)
PERFORMED ON: ABNORMAL
PLATELET # BLD AUTO: 229 K/UL (ref 135–450)
PMV BLD AUTO: 8.9 FL (ref 5–10.5)
POTASSIUM SERPL-SCNC: 3.6 MMOL/L (ref 3.5–5.1)
RBC # BLD AUTO: 3.43 M/UL (ref 4.2–5.9)
SODIUM SERPL-SCNC: 135 MMOL/L (ref 136–145)
WBC # BLD AUTO: 8.5 K/UL (ref 4–11)

## 2025-04-02 PROCEDURE — 6360000004 HC RX CONTRAST MEDICATION: Performed by: INTERNAL MEDICINE

## 2025-04-02 PROCEDURE — 36415 COLL VENOUS BLD VENIPUNCTURE: CPT

## 2025-04-02 PROCEDURE — 6370000000 HC RX 637 (ALT 250 FOR IP): Performed by: INTERNAL MEDICINE

## 2025-04-02 PROCEDURE — 80048 BASIC METABOLIC PNL TOTAL CA: CPT

## 2025-04-02 PROCEDURE — 99024 POSTOP FOLLOW-UP VISIT: CPT | Performed by: NURSE PRACTITIONER

## 2025-04-02 PROCEDURE — 85027 COMPLETE CBC AUTOMATED: CPT

## 2025-04-02 PROCEDURE — C8929 TTE W OR WO FOL WCON,DOPPLER: HCPCS

## 2025-04-02 RX ORDER — FUROSEMIDE 20 MG/1
20 TABLET ORAL DAILY
Qty: 90 TABLET | Refills: 3
Start: 2025-04-03

## 2025-04-02 RX ORDER — FUROSEMIDE 20 MG/1
20 TABLET ORAL DAILY
Status: DISCONTINUED | OUTPATIENT
Start: 2025-04-02 | End: 2025-04-02 | Stop reason: HOSPADM

## 2025-04-02 RX ORDER — LANOLIN ALCOHOL/MO/W.PET/CERES
400 CREAM (GRAM) TOPICAL DAILY
Status: DISCONTINUED | OUTPATIENT
Start: 2025-04-02 | End: 2025-04-02 | Stop reason: HOSPADM

## 2025-04-02 RX ORDER — DOFETILIDE 0.12 MG/1
125 CAPSULE ORAL EVERY 12 HOURS SCHEDULED
Status: DISCONTINUED | OUTPATIENT
Start: 2025-04-02 | End: 2025-04-02 | Stop reason: HOSPADM

## 2025-04-02 RX ADMIN — DOFETILIDE 125 MCG: 0.12 CAPSULE ORAL at 00:14

## 2025-04-02 RX ADMIN — INSULIN LISPRO 1 UNITS: 100 INJECTION, SOLUTION INTRAVENOUS; SUBCUTANEOUS at 12:53

## 2025-04-02 RX ADMIN — CLOPIDOGREL BISULFATE 75 MG: 75 TABLET, FILM COATED ORAL at 10:26

## 2025-04-02 RX ADMIN — SULFUR HEXAFLUORIDE 2 ML: 60.7; .19; .19 INJECTION, POWDER, LYOPHILIZED, FOR SUSPENSION INTRAVENOUS; INTRAVESICAL at 09:50

## 2025-04-02 RX ADMIN — TRAZODONE HYDROCHLORIDE 100 MG: 50 TABLET ORAL at 00:14

## 2025-04-02 RX ADMIN — FUROSEMIDE 20 MG: 20 TABLET ORAL at 10:33

## 2025-04-02 RX ADMIN — METOPROLOL TARTRATE 25 MG: 50 TABLET, FILM COATED ORAL at 10:27

## 2025-04-02 RX ADMIN — DOCUSATE SODIUM 100 MG: 100 CAPSULE, LIQUID FILLED ORAL at 10:26

## 2025-04-02 RX ADMIN — METOPROLOL TARTRATE 50 MG: 50 TABLET, FILM COATED ORAL at 10:27

## 2025-04-02 RX ADMIN — GEMFIBROZIL 600 MG: 600 TABLET ORAL at 10:38

## 2025-04-02 RX ADMIN — Medication 400 MG: at 12:53

## 2025-04-02 RX ADMIN — LEVOTHYROXINE SODIUM 125 MCG: 125 TABLET ORAL at 10:26

## 2025-04-02 RX ADMIN — ASPIRIN 81 MG: 81 TABLET, COATED ORAL at 10:26

## 2025-04-02 RX ADMIN — DOFETILIDE 125 MCG: 0.12 CAPSULE ORAL at 10:38

## 2025-04-02 ASSESSMENT — PAIN SCALES - GENERAL
PAINLEVEL_OUTOF10: 0

## 2025-04-02 NOTE — PROGRESS NOTES
[facility]  TAVR Brief AM Assessment    Overnight Events No significant events.   Access Sites Clean, dry, intact without bleeding, hematoma or bruit.   Telemetry No significant events on telemetry.   Labs No significant laboratory abnormalities/changes.   Echo Review Echo pending and will be reviewed prior to discharge.   Disposition Likely discharge pending favorable echo findings and satisfactory ambulation

## 2025-04-02 NOTE — PLAN OF CARE
Problem: Pain  Goal: Verbalizes/displays adequate comfort level or baseline comfort level  Outcome: Progressing     Problem: Safety - Adult  Goal: Free from fall injury  Outcome: Progressing     Problem: ABCDS Injury Assessment  Goal: Absence of physical injury  Outcome: Progressing     Problem: Chronic Conditions and Co-morbidities  Goal: Patient's chronic conditions and co-morbidity symptoms are monitored and maintained or improved  Outcome: Progressing  Flowsheets (Taken 4/1/2025 2000)  Care Plan - Patient's Chronic Conditions and Co-Morbidity Symptoms are Monitored and Maintained or Improved:   Monitor and assess patient's chronic conditions and comorbid symptoms for stability, deterioration, or improvement   Collaborate with multidisciplinary team to address chronic and comorbid conditions and prevent exacerbation or deterioration     Problem: Discharge Planning  Goal: Discharge to home or other facility with appropriate resources  Outcome: Progressing  Flowsheets (Taken 4/1/2025 2000)  Discharge to home or other facility with appropriate resources:   Identify barriers to discharge with patient and caregiver   Arrange for needed discharge resources and transportation as appropriate

## 2025-04-02 NOTE — DISCHARGE INSTRUCTIONS
Post Transcatheter Aortic Valve Replacement (TAVR) Discharge Instructions     Your follow-up appointment and echocardiogram will be scheduled by Dr. Gil or Dr. Crowley's office and their office staff and will call you with the date and time.                           We are here for you! If you have any questions please call: Brenda Osullivan RN Valve Coordinator at  [phone]      Do you have the help you need at home?                                          ACTIVITY:  Weigh yourself every day in the morning after using the bathroom.  Your discharge weight was ***  NO DRIVING UNTIL YOU RETURN TO THE DOCTOR'S OFFICE.  You may ride in a car.   Do not lift more than five to ten pounds for the first week you are home.  (A gallon of milk is 7 lbs)  Get up and get dressed every day. Do not stay in bed. Set a daily routine. This is an important step in re-gaining your strength.  You may walk up and down stairs.   Walk as much as you can.  This will help facilitate the healing process.  Plan rest periods during the day.  Cough and deep breathe, and use your incentive spirometer 10 times every hour while you are awake.   Avoid work that increases muscle tension. (straining with bowel movements, moving furniture)    WOUND CARE:  Shower every day but no bathtubs, pools, or hot tubs for the first week you are home.   Cleanse wounds with mild soap and water and pat dry. Keep site dry.   A small amount of bloody or clear drainage is normal.   Watch for signs of infection: incision red or hot to the touch, fever > 101F, swelling at the groin or incision site, discolored drainage from your incision.     NUTRITION:   Low fat, low salt diet (guidelines for Heart Healthy eating)  Limit caffeine to 1-2 cups per day (coffee, tea, chocolate, soda)  Eat high fiber to avoid constipation and straining during bowel movements (fresh veggies and fruit, whole grains)  Limit alcohol to two servings a day ( 8 oz beer, 1 oz liquor, 4 oz

## 2025-04-02 NOTE — PROGRESS NOTES
Data- discharge order received, pt verbalized agreement to discharge, disposition to previous residence, no needs for HHC/DME.     Action- discharge instructions prepared and given to pt, pt verbalized understanding. Medication information packet given r/t NEW and/or CHANGED prescriptions emphasizing name/purpose/side effects, pt verbalized understanding. Discharge instruction summary: Diet- cardiac/ low carb, Activity- up as tolerated, Primary Care Physician as follows: Vaughn Bennett [phone] f/u appointment within a week of dc, immunizations reviewed and updated, prescription medications filled at pharmacy of choice. Inpatient surgical procedure precautions reviewed: 60 min of CHF Education reviewed. Pt/ Family has had a total of 60 minutes CHF education this admission encounter.     1. WEIGHT: Admit Weight - Scale: 77 kg (169 lb 12.8 oz) (04/01/25 0804)        Today  Weight - Scale: 75.8 kg (167 lb) (04/02/25 0914)       2. O2 SAT.: SpO2: 98 % (04/02/25 1142)    Response- Pt belongings gathered, IV removed. Disposition is home (no HHC/DME needs), transported with belongings, taken to lobby via w/c w/ RN, no complications.

## 2025-04-02 NOTE — PROGRESS NOTES
CVTS Thoracic Progress Note:          CC:  Post op follow up TAVR 4/1/25    Subj: awake, up in chair in nad. Reports he hasn't felt this good in years.     Obj:    Blood pressure (!) 143/66, pulse 63, temperature 97.6 °F (36.4 °C), temperature source Temporal, resp. rate 16, height 1.727 m (5' 8\"), weight 75.8 kg (167 lb), SpO2 100%.    Lungs ctab   Abdomen soft, non-tender   UOP satisfactory     Diagnostics:   Recent Labs     04/01/25  0952 04/01/25  1015 04/02/25  0517   WBC  --   --  8.5   HGB 9.5* 8.4* 9.9*   HCT  --   --  29.3*   PLT  --   --  229                                                                  Recent Labs     04/02/25  0517   *   K 3.6      CO2 21   BUN 23*   CREATININE 0.9   GLUCOSE 118*     Echo: pre-TAVR 12/6/24  Echo Findings  Left Ventricle Hyperdynamic left ventricular systolic function with a visually estimated EF of 65 - 70%. Left ventricle size is normal. Mildly increased wall thickness. Normal wall motion.   Left Atrium Not assessed.   Interatrial Septum Interatrial septum was not assessed.   Right Ventricle Not assessed.   Right Atrium Not assessed.   Aortic Valve Trileaflet valve. Severely calcified cusps. Severely restricted motion. Mild regurgitation. Severe stenosis of the aortic valve. AV area by continuity VTI is 0.8 cm2. AV area by peak velocity is 0.7 cm2.  Visually and by continuity equation, aortic stenosis is severe.  Mean gradient is moderate.   Mitral Valve Mildly thickened leaflets. Mild regurgitation. No stenosis noted.   Tricuspid Valve Valve structure is normal. Mild regurgitation. No stenosis noted. TR Peak Gradient is 19 mmHg.   Pulmonic Valve The pulmonic valve visualization is suboptimal but appears to be functioning normally. No regurgitation. No stenosis noted.   Pulmonary Artery Pulmonary artery was not assessed.   Aorta Not assessed.   IVC/Hepatic Veins IVC was not assessed.   Pericardium No pericardial effusion.   Extracardiac No pleural

## 2025-04-02 NOTE — CARE COORDINATION
Discharge Planning Note:  Chart reviewed and it appears that patient has minimal needs for discharge at this time.     Risk Score 11 %     Primary Care Physician is Vaughn Bennett MD    Primary insurance is Medicare    Please notify case management if any discharge needs are identified.      Case management will continue to follow progress and update discharge plan as needed.

## 2025-04-03 NOTE — CARE COORDINATION
04/03/25 0757   IMM Letter   IMM Letter given to Patient/Family/Significant other/Guardian/POA/by: IMM Given   IMM Letter date given: 04/01/25   IMM Letter time given: 0719

## 2025-04-21 RX ORDER — DOFETILIDE 0.12 MG/1
125 CAPSULE ORAL EVERY 12 HOURS SCHEDULED
Qty: 180 CAPSULE | Refills: 1 | Status: SHIPPED | OUTPATIENT
Start: 2025-04-21

## 2025-04-28 ENCOUNTER — OFFICE VISIT (OUTPATIENT)
Dept: CARDIOLOGY CLINIC | Age: 80
End: 2025-04-28
Payer: MEDICARE

## 2025-04-28 ENCOUNTER — HOSPITAL ENCOUNTER (OUTPATIENT)
Age: 80
Discharge: HOME | End: 2025-04-30
Attending: INTERNAL MEDICINE
Payer: MEDICARE

## 2025-04-28 VITALS
SYSTOLIC BLOOD PRESSURE: 135 MMHG | WEIGHT: 167 LBS | HEIGHT: 68 IN | DIASTOLIC BLOOD PRESSURE: 66 MMHG | BODY MASS INDEX: 25.31 KG/M2

## 2025-04-28 VITALS
WEIGHT: 173 LBS | HEART RATE: 81 BPM | BODY MASS INDEX: 26.3 KG/M2 | DIASTOLIC BLOOD PRESSURE: 70 MMHG | SYSTOLIC BLOOD PRESSURE: 122 MMHG | OXYGEN SATURATION: 96 %

## 2025-04-28 DIAGNOSIS — I48.0 PAROXYSMAL ATRIAL FIBRILLATION (HCC): Primary | ICD-10-CM

## 2025-04-28 DIAGNOSIS — Z95.2 HISTORY OF TRANSCATHETER AORTIC VALVE REPLACEMENT (TAVR): ICD-10-CM

## 2025-04-28 DIAGNOSIS — I35.0 NONRHEUMATIC AORTIC VALVE STENOSIS: ICD-10-CM

## 2025-04-28 DIAGNOSIS — Z95.2 S/P TAVR (TRANSCATHETER AORTIC VALVE REPLACEMENT): ICD-10-CM

## 2025-04-28 DIAGNOSIS — I25.10 ASHD (ARTERIOSCLEROTIC HEART DISEASE): ICD-10-CM

## 2025-04-28 LAB
ECHO AO ASC DIAM: 4.1 CM
ECHO AO ASCENDING AORTA INDEX: 2.17 CM/M2
ECHO AO ROOT DIAM: 4 CM
ECHO AO ROOT INDEX: 2.12 CM/M2
ECHO AV ACCELERATION TIME: 63 MS
ECHO AV AREA PEAK VELOCITY: 2.8 CM2
ECHO AV AREA VTI: 2.9 CM2
ECHO AV AREA/BSA PEAK VELOCITY: 1.5 CM2/M2
ECHO AV AREA/BSA VTI: 1.5 CM2/M2
ECHO AV MEAN GRADIENT: 6 MMHG
ECHO AV MEAN VELOCITY: 1.1 M/S
ECHO AV PEAK GRADIENT: 11 MMHG
ECHO AV PEAK VELOCITY: 1.7 M/S
ECHO AV VELOCITY RATIO: 0.65
ECHO AV VTI: 43.3 CM
ECHO BSA: 1.91 M2
ECHO EST RA PRESSURE: 3 MMHG
ECHO IVC INSP: 0.1 CM
ECHO IVC PROX: 1.2 CM
ECHO LA AREA 2C: 36.7 CM2
ECHO LA AREA 4C: 28.6 CM2
ECHO LA MAJOR AXIS: 7.6 CM
ECHO LA MINOR AXIS: 6.8 CM
ECHO LA VOL BP: 126 ML (ref 18–58)
ECHO LA VOL MOD A2C: 159 ML (ref 18–58)
ECHO LA VOL MOD A4C: 91 ML (ref 18–58)
ECHO LA VOL/BSA BIPLANE: 67 ML/M2 (ref 16–34)
ECHO LA VOLUME INDEX MOD A2C: 84 ML/M2 (ref 16–34)
ECHO LA VOLUME INDEX MOD A4C: 48 ML/M2 (ref 16–34)
ECHO LV E' LATERAL VELOCITY: 8.87 CM/S
ECHO LV E' SEPTAL VELOCITY: 7.87 CM/S
ECHO LV EDV A2C: 136 ML
ECHO LV EDV A4C: 150 ML
ECHO LV EDV INDEX A4C: 79 ML/M2
ECHO LV EDV NDEX A2C: 72 ML/M2
ECHO LV EF PHYSICIAN: 61 %
ECHO LV EJECTION FRACTION A2C: 58 %
ECHO LV EJECTION FRACTION A4C: 62 %
ECHO LV EJECTION FRACTION BIPLANE: 60 % (ref 55–100)
ECHO LV ESV A2C: 58 ML
ECHO LV ESV A4C: 57 ML
ECHO LV ESV INDEX A2C: 31 ML/M2
ECHO LV ESV INDEX A4C: 30 ML/M2
ECHO LV FRACTIONAL SHORTENING: 40 % (ref 28–44)
ECHO LV INTERNAL DIMENSION DIASTOLE INDEX: 2.28 CM/M2
ECHO LV INTERNAL DIMENSION DIASTOLIC: 4.3 CM (ref 4.2–5.9)
ECHO LV INTERNAL DIMENSION SYSTOLIC INDEX: 1.38 CM/M2
ECHO LV INTERNAL DIMENSION SYSTOLIC: 2.6 CM
ECHO LV IVSD: 1.1 CM (ref 0.6–1)
ECHO LV MASS 2D: 162.9 G (ref 88–224)
ECHO LV MASS INDEX 2D: 86.2 G/M2 (ref 49–115)
ECHO LV POSTERIOR WALL DIASTOLIC: 1.1 CM (ref 0.6–1)
ECHO LV RELATIVE WALL THICKNESS RATIO: 0.51
ECHO LVOT AREA: 4.2 CM2
ECHO LVOT AV VTI INDEX: 0.7
ECHO LVOT DIAM: 2.3 CM
ECHO LVOT MEAN GRADIENT: 3 MMHG
ECHO LVOT PEAK GRADIENT: 5 MMHG
ECHO LVOT PEAK VELOCITY: 1.1 M/S
ECHO LVOT STROKE VOLUME INDEX: 66.8 ML/M2
ECHO LVOT SV: 126.2 ML
ECHO LVOT VTI: 30.4 CM
ECHO MV A VELOCITY: 1.08 M/S
ECHO MV AREA VTI: 3.1 CM2
ECHO MV E DECELERATION TIME (DT): 257 MS
ECHO MV E VELOCITY: 0.97 M/S
ECHO MV E/A RATIO: 0.9
ECHO MV E/E' LATERAL: 10.94
ECHO MV E/E' RATIO (AVERAGED): 11.63
ECHO MV E/E' SEPTAL: 12.33
ECHO MV LVOT VTI INDEX: 1.35
ECHO MV MAX VELOCITY: 1 M/S
ECHO MV MEAN GRADIENT: 2 MMHG
ECHO MV MEAN VELOCITY: 0.7 M/S
ECHO MV PEAK GRADIENT: 4 MMHG
ECHO MV VTI: 40.9 CM
ECHO PV MAX VELOCITY: 0.9 M/S
ECHO PV PEAK GRADIENT: 3 MMHG
ECHO RA AREA 4C: 24.7 CM2
ECHO RA END SYSTOLIC VOLUME APICAL 4 CHAMBER INDEX BSA: 40 ML/M2
ECHO RA VOLUME: 76 ML
ECHO RIGHT VENTRICULAR SYSTOLIC PRESSURE (RVSP): 37 MMHG
ECHO RV BASAL DIMENSION: 4.2 CM
ECHO RV FREE WALL PEAK S': 12.8 CM/S
ECHO RV MID DIMENSION: 2.3 CM
ECHO RV TAPSE: 2.3 CM (ref 1.7–?)
ECHO TV REGURGITANT MAX VELOCITY: 2.93 M/S
ECHO TV REGURGITANT PEAK GRADIENT: 34 MMHG

## 2025-04-28 PROCEDURE — G8427 DOCREV CUR MEDS BY ELIG CLIN: HCPCS | Performed by: INTERNAL MEDICINE

## 2025-04-28 PROCEDURE — 1159F MED LIST DOCD IN RCRD: CPT | Performed by: INTERNAL MEDICINE

## 2025-04-28 PROCEDURE — 93306 TTE W/DOPPLER COMPLETE: CPT

## 2025-04-28 PROCEDURE — G8417 CALC BMI ABV UP PARAM F/U: HCPCS | Performed by: INTERNAL MEDICINE

## 2025-04-28 PROCEDURE — 99214 OFFICE O/P EST MOD 30 MIN: CPT | Performed by: INTERNAL MEDICINE

## 2025-04-28 PROCEDURE — 1036F TOBACCO NON-USER: CPT | Performed by: INTERNAL MEDICINE

## 2025-04-28 PROCEDURE — 3074F SYST BP LT 130 MM HG: CPT | Performed by: INTERNAL MEDICINE

## 2025-04-28 PROCEDURE — 3078F DIAST BP <80 MM HG: CPT | Performed by: INTERNAL MEDICINE

## 2025-04-28 PROCEDURE — 93306 TTE W/DOPPLER COMPLETE: CPT | Performed by: INTERNAL MEDICINE

## 2025-04-28 PROCEDURE — 1160F RVW MEDS BY RX/DR IN RCRD: CPT | Performed by: INTERNAL MEDICINE

## 2025-04-28 PROCEDURE — 93000 ELECTROCARDIOGRAM COMPLETE: CPT | Performed by: INTERNAL MEDICINE

## 2025-04-28 PROCEDURE — 1111F DSCHRG MED/CURRENT MED MERGE: CPT | Performed by: INTERNAL MEDICINE

## 2025-04-28 PROCEDURE — 1123F ACP DISCUSS/DSCN MKR DOCD: CPT | Performed by: INTERNAL MEDICINE

## 2025-04-28 RX ORDER — METOPROLOL SUCCINATE 25 MG/1
25 TABLET, EXTENDED RELEASE ORAL DAILY
Qty: 90 TABLET | Refills: 3 | Status: SHIPPED | OUTPATIENT
Start: 2025-04-28

## 2025-04-28 RX ORDER — METOPROLOL SUCCINATE 50 MG/1
50 TABLET, EXTENDED RELEASE ORAL DAILY
Qty: 90 TABLET | Refills: 3 | Status: SHIPPED | OUTPATIENT
Start: 2025-04-28

## 2025-04-28 NOTE — PATIENT INSTRUCTIONS
Thank you for choosing [facility] for your cardiac care.    During your visit today, we reviewed and confirmed your cardiac medications along with  medication prescribed by your other healthcare team members. Please be sure to discuss any  changes to medication with your providers.    Please bring a list of ALL medications (or the bottles) with you to EVERY appointment.  Also include vitamins and over-the-counter medications.    If you need refills for any cardiac medications, please call your pharmacy and they will reach out to us electronically.    Did your provider order testing today? If yes, then you will receive your results in three  possible ways. You can receive a [employer] message, a phone call, or letter in the mail. Please  note, if you are an active [employer] user, some of your testing will be available within 1-2 days.    Finally, please know that it is good for your heart to exercise and follow a healthy, low-fat diet  as advised by your physician and health care providers.    If you are experiencing a medical emergency, please call 911 immediately.    It's easy to register for a [employer] account if you don't already have one. With a [employer]  account you can manage your health record, view test results, schedule appointments and  more.     Dr. Anton's clinical staff can be reached at the following phone number: [phone]    If any cardiac testing is ordered, please contact central scheduling at [phone] to get your test scheduled.

## 2025-04-28 NOTE — PROGRESS NOTES
[facility]     Outpatient Cardiology         Patient Name:  Panchito Arnold  Primary Care Physician: Vaughn Bennett MD  04/25/25     Assessment & Plan    Assessment / Plan:     Aortic stenosis s/p TAVR 4/2/25.  Referred to Dr. Spence for aortic stenosis, underwent C 1/29/25 that showed no significant CAD and underwent TAVR 4/2/25.     Essential hypertension-has had mildly elevated blood pressure readings in the evening.  Blood pressure readings are acceptable.  Avoid hypotension.  Continue Lopressor.    Sick sinus syndrome, history of symptomatic paroxysmal A-fib in the past, status post dual-chamber pacemaker-on Tikosyn.  QTc is acceptable.  Normal LV function.    Type 2 diabetes mellitus-stable    Obstructive sleep apnea on CPAP-stable                  Chief Complaint:     No chief complaint on file.      History of Present Illness:       HPI     Panchito Arnold is a 79 y.o. male with PMH of paroxysmal Afib, pacemaker/ICD, CHF, CAD, HTN, AS s/p TAVR 4/2/25 and HLD here for a follow up. Previously seen by Dr Cameron. He was referred to Dr. Spence for aortic stenosis, he underwent C 1/29/25 that showed no significant CAD and underwent TAVR 4/2/25.       Today,       No paroxysmal dyspnea.  No orthopnea.  No syncope.  No nocturnal angina.  No bleeding or embolic symptoms.    Reviewed medications, tests and labs    PMH  Past Medical History:   Diagnosis Date    Aortic stenosis 2015 Feb 2019 mean gradient 35mg Hg    Atrial fibrillation (HCC) 07/2012    Saw cardiologist for palps. Event recorder showed AF about 5% of the time. Warfarin started. Became persistent in March 2019 when admitted with pneumonia.    CAD (coronary artery disease), native coronary artery Dec 18, 2007    Presented with palpitation to ER. Slight inc. in S. troponin. Cor. angio showed 90% L. circ. Stent placed. (Dr Cameron.    Diabetes mellitus ([facility]) 1992    HbA1C in Jennifer '15 was 6.9 on metformin and glipizide.     
capsule by mouth 2 times daily   Yes Wilian Willis MD   ipratropium (ATROVENT) 0.03 % nasal spray 2 sprays by Each Nostril route in the morning and 2 sprays in the evening.   Yes Wilian Willis MD   tamsulosin (FLOMAX) 0.4 MG capsule Take 1 capsule by mouth nightly   Yes Wilian Willis MD   traZODone (DESYREL) 100 MG tablet Take 1 tablet by mouth nightly   Yes Wilian Willis MD   atorvastatin (LIPITOR) 40 MG tablet Take 1 tablet by mouth nightly   Yes Wilian Willis MD   aspirin 81 MG tablet Take 1 tablet by mouth daily   Yes Wilian Willis MD   levothyroxine (SYNTHROID) 125 MCG tablet Take 1 tablet by mouth daily   Yes Wilian Willis MD   allopurinol (ZYLOPRIM) 300 MG tablet Take 1 tablet by mouth nightly   Yes Wilian Willis MD   gemfibrozil (LOPID) 600 MG tablet Take 1 tablet by mouth 2 times daily (before meals)   Yes Wilian Willis MD   metformin (GLUCOPHAGE) 1000 MG tablet Take 1 tablet by mouth 2 times daily (with meals)   Yes Wilian Willis MD          Vitals:    04/28/25 1313   BP: 122/70   Pulse: 81   SpO2: 96%   Weight: 78.5 kg (173 lb)         BP Readings from Last 3 Encounters:   04/28/25 122/70   04/28/25 135/66   04/02/25 135/66     Wt Readings from Last 3 Encounters:   04/28/25 78.5 kg (173 lb)   04/28/25 75.8 kg (167 lb)   04/02/25 75.8 kg (167 lb)       Physical Exam  General appearance: alert, appears stated age, cooperative and no distress  Lungs: Unlabored breathing, clear to auscultation bilaterally  Heart: irregular rate and rhythm and S1, S2 normal,  Abdomen: soft, non-tender; bowel sounds normal  Extremities: no cyanosis, no edema   Skin: Skin color, texture, turgor normal.  Neurologic: No focal deficits. A, O x 3    Labs:       Lab Results   Component Value Date    WBC 8.5 04/02/2025    HGB 9.9 (L) 04/02/2025    HCT 29.3 (L) 04/02/2025    MCV 85.5 04/02/2025     04/02/2025     Lab Results   Component Value Date

## 2025-05-01 ENCOUNTER — RESULTS FOLLOW-UP (OUTPATIENT)
Dept: CARDIOLOGY CLINIC | Age: 80
End: 2025-05-01

## 2025-05-15 NOTE — PROGRESS NOTES
in 1 week   Remote transmission in 3 months to assess AF burden.   Discussed additional treatment options for AF including antiarrythmic medications, RFCA and pacemaker with AVN ablation. Patient is satisfied with current treatment plan of rate control and stroke prophylaxis.    Optimize risk factors for AF including treatment of BRAD, BP control, weight loss, healthy diet and routine exercise  Follow-up with EP MD in 6 months with device check  - ECG ordered and results personally reviewed    All questions and concerns were addressed to the patient/family. Alternatives to my treatment were discussed. The note was completed using EMR. Every effort was made to ensure accuracy; however, inadvertent computerized transcription errors may be present.     Patient received education regarding their diagnosis, treatment and medications while in the office today.    Problem List:  Patient Active Problem List    Diagnosis Date Noted    PAF (paroxysmal atrial fibrillation) ([facility]) 02/06/2023    A-fib ([facility]) 02/06/2023    Obstructive sleep apnea on CPAP 02/13/2025    Type 2 diabetes mellitus without complication, without long-term current use of insulin ([facility]) 02/13/2025    Symptomatic severe aortic stenosis with normal ejection fraction 12/11/2024    Chronic diastolic (congestive) heart failure ([facility]) 03/28/2019    Nonrheumatic aortic valve stenosis 03/28/2019    Acute diastolic (congestive) heart failure ([facility]) 03/07/2019    Community acquired pneumonia 03/07/2019    Pneumonia 03/01/2019    Cardiac pacemaker in situ 02/15/2017    Bradycardia     Dizziness and giddiness     Lightheaded     Sick sinus syndrome (HCC)     Paroxysmal atrial fibrillation ([facility])     Syncope 11/04/2015    Hyperlipidemia     Old myocardial infarction     Essential hypertension     Coronary artery disease involving native coronary artery of native heart without angina pectoris     Palpitations     Shortness of breath     History of coronary angioplasty

## 2025-05-16 ENCOUNTER — OFFICE VISIT (OUTPATIENT)
Dept: CARDIOLOGY CLINIC | Age: 80
End: 2025-05-16
Payer: MEDICARE

## 2025-05-16 VITALS
WEIGHT: 170.2 LBS | HEART RATE: 51 BPM | SYSTOLIC BLOOD PRESSURE: 136 MMHG | DIASTOLIC BLOOD PRESSURE: 80 MMHG | BODY MASS INDEX: 25.88 KG/M2

## 2025-05-16 DIAGNOSIS — E78.2 MIXED HYPERLIPIDEMIA: ICD-10-CM

## 2025-05-16 DIAGNOSIS — I35.0 NONRHEUMATIC AORTIC VALVE STENOSIS: Primary | ICD-10-CM

## 2025-05-16 DIAGNOSIS — I10 ESSENTIAL HYPERTENSION: ICD-10-CM

## 2025-05-16 DIAGNOSIS — Z95.2 HISTORY OF TRANSCATHETER AORTIC VALVE REPLACEMENT (TAVR): ICD-10-CM

## 2025-05-16 DIAGNOSIS — I25.10 CORONARY ARTERY DISEASE INVOLVING NATIVE CORONARY ARTERY OF NATIVE HEART WITHOUT ANGINA PECTORIS: ICD-10-CM

## 2025-05-16 DIAGNOSIS — I48.0 PAF (PAROXYSMAL ATRIAL FIBRILLATION) (HCC): ICD-10-CM

## 2025-05-16 PROCEDURE — G8427 DOCREV CUR MEDS BY ELIG CLIN: HCPCS | Performed by: INTERNAL MEDICINE

## 2025-05-16 PROCEDURE — 3079F DIAST BP 80-89 MM HG: CPT | Performed by: INTERNAL MEDICINE

## 2025-05-16 PROCEDURE — 1123F ACP DISCUSS/DSCN MKR DOCD: CPT | Performed by: INTERNAL MEDICINE

## 2025-05-16 PROCEDURE — 3075F SYST BP GE 130 - 139MM HG: CPT | Performed by: INTERNAL MEDICINE

## 2025-05-16 PROCEDURE — G8417 CALC BMI ABV UP PARAM F/U: HCPCS | Performed by: INTERNAL MEDICINE

## 2025-05-16 PROCEDURE — 1036F TOBACCO NON-USER: CPT | Performed by: INTERNAL MEDICINE

## 2025-05-16 PROCEDURE — 99214 OFFICE O/P EST MOD 30 MIN: CPT | Performed by: INTERNAL MEDICINE

## 2025-05-16 NOTE — PROGRESS NOTES
questions.    Luis Armando Spence MD, MPH    [facility]  [address], [address]   [place] [address]  Ph: [phone]  Fax: [phone]    This note was scribed in the presence of Dr Spence, by Patti Stoll RN  Physician Attestation:  The scribes documentation has been prepared under my direction and personally reviewed by me in its entirety.     I confirm that the note above accurately reflects all work, treatment, procedures, and medical decision making performed by me.

## 2025-05-19 ENCOUNTER — OFFICE VISIT (OUTPATIENT)
Dept: CARDIOLOGY CLINIC | Age: 80
End: 2025-05-19
Payer: MEDICARE

## 2025-05-19 ENCOUNTER — CLINICAL SUPPORT (OUTPATIENT)
Dept: CARDIOLOGY CLINIC | Age: 80
End: 2025-05-19

## 2025-05-19 VITALS
SYSTOLIC BLOOD PRESSURE: 140 MMHG | WEIGHT: 173.8 LBS | BODY MASS INDEX: 26.43 KG/M2 | DIASTOLIC BLOOD PRESSURE: 72 MMHG | HEART RATE: 63 BPM

## 2025-05-19 DIAGNOSIS — Z51.81 ENCOUNTER FOR MONITORING DOFETILIDE THERAPY: ICD-10-CM

## 2025-05-19 DIAGNOSIS — Z79.01 ON CONTINUOUS ORAL ANTICOAGULATION: ICD-10-CM

## 2025-05-19 DIAGNOSIS — I48.0 PAF (PAROXYSMAL ATRIAL FIBRILLATION) (HCC): ICD-10-CM

## 2025-05-19 DIAGNOSIS — I48.0 PAF (PAROXYSMAL ATRIAL FIBRILLATION) (HCC): Primary | ICD-10-CM

## 2025-05-19 DIAGNOSIS — Z79.899 ENCOUNTER FOR MONITORING DOFETILIDE THERAPY: ICD-10-CM

## 2025-05-19 DIAGNOSIS — Z95.0 CARDIAC PACEMAKER IN SITU: ICD-10-CM

## 2025-05-19 DIAGNOSIS — Z95.2 HISTORY OF TRANSCATHETER AORTIC VALVE REPLACEMENT (TAVR): ICD-10-CM

## 2025-05-19 DIAGNOSIS — I49.5 SICK SINUS SYNDROME (HCC): ICD-10-CM

## 2025-05-19 DIAGNOSIS — I35.0 NONRHEUMATIC AORTIC VALVE STENOSIS: ICD-10-CM

## 2025-05-19 PROCEDURE — 99214 OFFICE O/P EST MOD 30 MIN: CPT | Performed by: NURSE PRACTITIONER

## 2025-05-19 PROCEDURE — 1159F MED LIST DOCD IN RCRD: CPT | Performed by: NURSE PRACTITIONER

## 2025-05-19 PROCEDURE — 1123F ACP DISCUSS/DSCN MKR DOCD: CPT | Performed by: NURSE PRACTITIONER

## 2025-05-19 PROCEDURE — 93000 ELECTROCARDIOGRAM COMPLETE: CPT | Performed by: NURSE PRACTITIONER

## 2025-05-19 PROCEDURE — 3077F SYST BP >= 140 MM HG: CPT | Performed by: NURSE PRACTITIONER

## 2025-05-19 PROCEDURE — 1160F RVW MEDS BY RX/DR IN RCRD: CPT | Performed by: NURSE PRACTITIONER

## 2025-05-19 PROCEDURE — 3078F DIAST BP <80 MM HG: CPT | Performed by: NURSE PRACTITIONER

## 2025-05-19 RX ORDER — POTASSIUM CHLORIDE 1500 MG/1
20 TABLET, EXTENDED RELEASE ORAL DAILY
Qty: 30 TABLET | Refills: 5 | Status: SHIPPED | OUTPATIENT
Start: 2025-05-19

## 2025-05-20 ENCOUNTER — RESULTS FOLLOW-UP (OUTPATIENT)
Dept: CARDIOLOGY CLINIC | Age: 80
End: 2025-05-20

## 2025-05-20 LAB
ANION GAP SERPL CALCULATED.3IONS-SCNC: 11 MMOL/L (ref 3–16)
BUN SERPL-MCNC: 31 MG/DL (ref 7–20)
CALCIUM SERPL-MCNC: 9.8 MG/DL (ref 8.3–10.6)
CHLORIDE SERPL-SCNC: 101 MMOL/L (ref 99–110)
CO2 SERPL-SCNC: 24 MMOL/L (ref 21–32)
CREAT SERPL-MCNC: 1.1 MG/DL (ref 0.8–1.3)
GFR SERPLBLD CREATININE-BSD FMLA CKD-EPI: 67 ML/MIN/{1.73_M2}
GLUCOSE SERPL-MCNC: 156 MG/DL (ref 70–99)
MAGNESIUM SERPL-MCNC: 1.89 MG/DL (ref 1.8–2.4)
POTASSIUM SERPL-SCNC: 5 MMOL/L (ref 3.5–5.1)
SODIUM SERPL-SCNC: 136 MMOL/L (ref 136–145)

## 2025-08-04 RX ORDER — AMOXICILLIN 500 MG/1
CAPSULE ORAL
Qty: 4 CAPSULE | Refills: 3 | Status: SHIPPED | OUTPATIENT
Start: 2025-08-04

## 2025-08-05 ENCOUNTER — HOSPITAL ENCOUNTER (OUTPATIENT)
Age: 80
Setting detail: OUTPATIENT SURGERY
Discharge: HOME | End: 2025-08-05
Attending: INTERNAL MEDICINE | Admitting: INTERNAL MEDICINE
Payer: MEDICARE

## 2025-08-05 ENCOUNTER — ANESTHESIA (OUTPATIENT)
Dept: ENDOSCOPY | Age: 80
End: 2025-08-05
Payer: MEDICARE

## 2025-08-05 ENCOUNTER — ANESTHESIA EVENT (OUTPATIENT)
Dept: ENDOSCOPY | Age: 80
End: 2025-08-05
Payer: MEDICARE

## 2025-08-05 ENCOUNTER — APPOINTMENT (OUTPATIENT)
Dept: ENDOSCOPY | Age: 80
End: 2025-08-05
Attending: INTERNAL MEDICINE
Payer: MEDICARE

## 2025-08-05 VITALS
TEMPERATURE: 97 F | RESPIRATION RATE: 16 BRPM | DIASTOLIC BLOOD PRESSURE: 72 MMHG | OXYGEN SATURATION: 100 % | HEIGHT: 68 IN | WEIGHT: 165.6 LBS | HEART RATE: 66 BPM | SYSTOLIC BLOOD PRESSURE: 128 MMHG | BODY MASS INDEX: 25.1 KG/M2

## 2025-08-05 DIAGNOSIS — D50.8 OTHER IRON DEFICIENCY ANEMIA: ICD-10-CM

## 2025-08-05 LAB
GLUCOSE BLD-MCNC: 128 MG/DL (ref 70–99)
PERFORMED ON: ABNORMAL

## 2025-08-05 PROCEDURE — 2580000003 HC RX 258: Performed by: ANESTHESIOLOGY

## 2025-08-05 PROCEDURE — 2709999900 HC NON-CHARGEABLE SUPPLY: Performed by: INTERNAL MEDICINE

## 2025-08-05 PROCEDURE — 88305 TISSUE EXAM BY PATHOLOGIST: CPT

## 2025-08-05 PROCEDURE — 7100000010 HC PHASE II RECOVERY - FIRST 15 MIN: Performed by: INTERNAL MEDICINE

## 2025-08-05 PROCEDURE — 3609010600 HC COLONOSCOPY POLYPECTOMY SNARE/COLD BIOPSY: Performed by: INTERNAL MEDICINE

## 2025-08-05 PROCEDURE — 3700000000 HC ANESTHESIA ATTENDED CARE: Performed by: INTERNAL MEDICINE

## 2025-08-05 PROCEDURE — 7100000011 HC PHASE II RECOVERY - ADDTL 15 MIN: Performed by: INTERNAL MEDICINE

## 2025-08-05 PROCEDURE — 3700000001 HC ADD 15 MINUTES (ANESTHESIA): Performed by: INTERNAL MEDICINE

## 2025-08-05 PROCEDURE — 3609012400 HC EGD TRANSORAL BIOPSY SINGLE/MULTIPLE: Performed by: INTERNAL MEDICINE

## 2025-08-05 PROCEDURE — 6360000002 HC RX W HCPCS

## 2025-08-05 RX ORDER — SODIUM CHLORIDE 0.9 % (FLUSH) 0.9 %
5-40 SYRINGE (ML) INJECTION EVERY 12 HOURS SCHEDULED
Status: CANCELLED | OUTPATIENT
Start: 2025-08-05

## 2025-08-05 RX ORDER — ONDANSETRON 2 MG/ML
4 INJECTION INTRAMUSCULAR; INTRAVENOUS
Status: CANCELLED | OUTPATIENT
Start: 2025-08-05

## 2025-08-05 RX ORDER — PROCHLORPERAZINE EDISYLATE 5 MG/ML
5 INJECTION INTRAMUSCULAR; INTRAVENOUS
Status: CANCELLED | OUTPATIENT
Start: 2025-08-05

## 2025-08-05 RX ORDER — SODIUM CHLORIDE 9 MG/ML
INJECTION, SOLUTION INTRAVENOUS PRN
Status: CANCELLED | OUTPATIENT
Start: 2025-08-05

## 2025-08-05 RX ORDER — MEPERIDINE HYDROCHLORIDE 25 MG/ML
12.5 INJECTION INTRAMUSCULAR; INTRAVENOUS; SUBCUTANEOUS EVERY 5 MIN PRN
Refills: 0 | Status: CANCELLED | OUTPATIENT
Start: 2025-08-05

## 2025-08-05 RX ORDER — DIPHENHYDRAMINE HYDROCHLORIDE 50 MG/ML
12.5 INJECTION, SOLUTION INTRAMUSCULAR; INTRAVENOUS
Status: CANCELLED | OUTPATIENT
Start: 2025-08-05

## 2025-08-05 RX ORDER — LIDOCAINE HYDROCHLORIDE 20 MG/ML
INJECTION, SOLUTION INTRAVENOUS
Status: DISCONTINUED | OUTPATIENT
Start: 2025-08-05 | End: 2025-08-05 | Stop reason: SDUPTHER

## 2025-08-05 RX ORDER — HYDROMORPHONE HYDROCHLORIDE 1 MG/ML
0.5 INJECTION, SOLUTION INTRAMUSCULAR; INTRAVENOUS; SUBCUTANEOUS EVERY 5 MIN PRN
Refills: 0 | Status: CANCELLED | OUTPATIENT
Start: 2025-08-05

## 2025-08-05 RX ORDER — LABETALOL HYDROCHLORIDE 5 MG/ML
10 INJECTION, SOLUTION INTRAVENOUS
Status: CANCELLED | OUTPATIENT
Start: 2025-08-05

## 2025-08-05 RX ORDER — FENTANYL CITRATE 50 UG/ML
25 INJECTION, SOLUTION INTRAMUSCULAR; INTRAVENOUS EVERY 5 MIN PRN
Refills: 0 | Status: CANCELLED | OUTPATIENT
Start: 2025-08-05

## 2025-08-05 RX ORDER — OXYCODONE HYDROCHLORIDE 5 MG/1
5 TABLET ORAL
Refills: 0 | Status: CANCELLED | OUTPATIENT
Start: 2025-08-05

## 2025-08-05 RX ORDER — DAPAGLIFLOZIN 10 MG/1
10 TABLET, FILM COATED ORAL EVERY MORNING
COMMUNITY

## 2025-08-05 RX ORDER — GLYCOPYRROLATE 0.2 MG/ML
INJECTION INTRAMUSCULAR; INTRAVENOUS
Status: DISCONTINUED | OUTPATIENT
Start: 2025-08-05 | End: 2025-08-05 | Stop reason: SDUPTHER

## 2025-08-05 RX ORDER — IPRATROPIUM BROMIDE AND ALBUTEROL SULFATE 2.5; .5 MG/3ML; MG/3ML
1 SOLUTION RESPIRATORY (INHALATION)
Status: CANCELLED | OUTPATIENT
Start: 2025-08-05

## 2025-08-05 RX ORDER — SODIUM CHLORIDE, SODIUM LACTATE, POTASSIUM CHLORIDE, CALCIUM CHLORIDE 600; 310; 30; 20 MG/100ML; MG/100ML; MG/100ML; MG/100ML
INJECTION, SOLUTION INTRAVENOUS CONTINUOUS
Status: DISCONTINUED | OUTPATIENT
Start: 2025-08-05 | End: 2025-08-05 | Stop reason: HOSPADM

## 2025-08-05 RX ORDER — PROPOFOL 10 MG/ML
INJECTION, EMULSION INTRAVENOUS
Status: DISCONTINUED | OUTPATIENT
Start: 2025-08-05 | End: 2025-08-05 | Stop reason: SDUPTHER

## 2025-08-05 RX ORDER — SODIUM CHLORIDE 0.9 % (FLUSH) 0.9 %
5-40 SYRINGE (ML) INJECTION PRN
Status: CANCELLED | OUTPATIENT
Start: 2025-08-05

## 2025-08-05 RX ADMIN — LIDOCAINE HYDROCHLORIDE 40 MG: 20 INJECTION, SOLUTION INTRAVENOUS at 12:08

## 2025-08-05 RX ADMIN — PROPOFOL 20 MG: 10 INJECTION, EMULSION INTRAVENOUS at 12:10

## 2025-08-05 RX ADMIN — GLYCOPYRROLATE 0.1 MG: 0.2 INJECTION INTRAMUSCULAR; INTRAVENOUS at 12:08

## 2025-08-05 RX ADMIN — SODIUM CHLORIDE, SODIUM LACTATE, POTASSIUM CHLORIDE, AND CALCIUM CHLORIDE: .6; .31; .03; .02 INJECTION, SOLUTION INTRAVENOUS at 10:48

## 2025-08-05 RX ADMIN — PROPOFOL 125 MCG/KG/MIN: 10 INJECTION, EMULSION INTRAVENOUS at 12:09

## 2025-08-05 RX ADMIN — PROPOFOL 30 MG: 10 INJECTION, EMULSION INTRAVENOUS at 12:08

## 2025-08-05 ASSESSMENT — PAIN SCALES - GENERAL
PAINLEVEL_OUTOF10: 0

## 2025-08-05 ASSESSMENT — ENCOUNTER SYMPTOMS: SHORTNESS OF BREATH: 1

## 2025-08-06 ENCOUNTER — TRANSCRIBE ORDERS (OUTPATIENT)
Dept: ADMINISTRATIVE | Age: 80
End: 2025-08-06

## 2025-08-06 DIAGNOSIS — D13.6: Primary | ICD-10-CM

## 2025-08-13 ENCOUNTER — HOSPITAL ENCOUNTER (OUTPATIENT)
Dept: CT IMAGING | Age: 80
Discharge: HOME | End: 2025-08-13
Attending: INTERNAL MEDICINE
Payer: MEDICARE

## 2025-08-13 DIAGNOSIS — D13.6: ICD-10-CM

## 2025-08-13 PROCEDURE — 74170 CT ABD WO CNTRST FLWD CNTRST: CPT

## 2025-08-13 PROCEDURE — 6360000004 HC RX CONTRAST MEDICATION: Performed by: INTERNAL MEDICINE

## 2025-08-13 RX ORDER — IOPAMIDOL 755 MG/ML
75 INJECTION, SOLUTION INTRAVASCULAR
Status: COMPLETED | OUTPATIENT
Start: 2025-08-13 | End: 2025-08-13

## 2025-08-13 RX ADMIN — IOPAMIDOL 75 ML: 755 INJECTION, SOLUTION INTRAVENOUS at 11:36

## 2025-08-25 ENCOUNTER — TELEPHONE (OUTPATIENT)
Dept: INTERNAL MEDICINE CLINIC | Age: 80
End: 2025-08-25

## (undated) DEVICE — PINNACLE INTRODUCER SHEATH: Brand: PINNACLE

## (undated) DEVICE — TR BAND RADIAL ARTERY COMPRESSION DEVICE: Brand: TR BAND

## (undated) DEVICE — VALVE AORT 26 MM TRANSCATHETER HRT VLV SYS SAPIEN 3 ULTRA

## (undated) DEVICE — Device

## (undated) DEVICE — EXTENSION SET, 2 INJECTION SITES, MALE LUER LOCK ADAPTER WITH RETRACTABLE COLLAR: Brand: INTERLINK

## (undated) DEVICE — CATHETER ANGIO 5FR L100CM GRY S STL NYL JL3.5 3 SEG BRAID L

## (undated) DEVICE — CATHETER ANGIO 5FR L100CM GRY S STL NYL JR4 3 SEG BRAID L

## (undated) DEVICE — CATHETER DIAG AD 5FR L100CM COR GRY HYDRPHLC NYL IM W/O

## (undated) DEVICE — Device: Brand: NOMOLINE™ LH ADULT NASAL CO2 CANNULA WITH O2 4M

## (undated) DEVICE — CATHETER DIAG AD 5FR L110CM 145DEG COR GRY HYDRPHLC NYL

## (undated) DEVICE — PERCLOSE™ PROSTYLE™ SUTURE-MEDIATED CLOSURE AND REPAIR SYSTEM: Brand: PERCLOSE™ PROSTYLE™

## (undated) DEVICE — FORCEPS BX L240CM JAW DIA2.4MM ORNG L CAP W/ NDL DISP RAD

## (undated) DEVICE — TRAP SPEC RETRV CLR PLAS POLYP IN LN SUCT QUIK CTCH

## (undated) DEVICE — DRAPE,ANGIO,BRACH,STERILE,38X44: Brand: MEDLINE

## (undated) DEVICE — PAD, DEFIB, ADULT, RADIOTRANS, PHYSIO: Brand: MEDLINE

## (undated) DEVICE — KIT AT-X65 ANGIOTOUCH HAND CONTROLLER

## (undated) DEVICE — GLIDESHEATH SLENDER NITINOL HYDROPHILIC COATED INTRODUCER SHEATH: Brand: GLIDESHEATH SLENDER

## (undated) DEVICE — CATHETER ANGIO INFIN 038IN AMPLATZ 534545T

## (undated) DEVICE — OPTIFOAM GENTLE LIQUITRAP, SACRUM, 7"X7": Brand: MEDLINE

## (undated) DEVICE — AMPLATZ EXTRA STIFF WIRE GUIDE: Brand: AMPLATZ

## (undated) DEVICE — GUIDEWIRE VASC L260CM DIA0.035IN RAD 3MM J TIP L7CM PTFE

## (undated) DEVICE — CATH LAB PACK: Brand: MEDLINE INDUSTRIES, INC.

## (undated) DEVICE — SNARE COLD DIAMOND 10MM THIN

## (undated) DEVICE — GUIDEWIRE VASC L150CM DIA0.035IN FLX END L7CM J 3MM PTFE